# Patient Record
Sex: FEMALE | Race: WHITE
[De-identification: names, ages, dates, MRNs, and addresses within clinical notes are randomized per-mention and may not be internally consistent; named-entity substitution may affect disease eponyms.]

---

## 2017-01-13 ENCOUNTER — HOSPITAL ENCOUNTER (OUTPATIENT)
Dept: HOSPITAL 53 - M PAIN | Age: 72
End: 2017-01-13
Attending: NURSE PRACTITIONER
Payer: MEDICARE

## 2017-01-13 DIAGNOSIS — I63.9: ICD-10-CM

## 2017-01-13 DIAGNOSIS — Z79.891: ICD-10-CM

## 2017-01-13 DIAGNOSIS — Z85.51: ICD-10-CM

## 2017-01-13 DIAGNOSIS — Z88.5: ICD-10-CM

## 2017-01-13 DIAGNOSIS — G89.29: Primary | ICD-10-CM

## 2017-01-13 DIAGNOSIS — G47.30: ICD-10-CM

## 2017-01-13 DIAGNOSIS — F41.9: ICD-10-CM

## 2017-01-13 DIAGNOSIS — Z88.1: ICD-10-CM

## 2017-01-13 DIAGNOSIS — I73.9: ICD-10-CM

## 2017-01-13 DIAGNOSIS — Z79.82: ICD-10-CM

## 2017-01-13 DIAGNOSIS — G43.909: ICD-10-CM

## 2017-01-13 DIAGNOSIS — M19.90: ICD-10-CM

## 2017-01-13 DIAGNOSIS — F32.9: ICD-10-CM

## 2017-01-13 DIAGNOSIS — I25.10: ICD-10-CM

## 2017-01-13 DIAGNOSIS — I10: ICD-10-CM

## 2017-01-13 DIAGNOSIS — Z88.8: ICD-10-CM

## 2017-01-13 DIAGNOSIS — E78.00: ICD-10-CM

## 2017-01-13 DIAGNOSIS — M43.07: ICD-10-CM

## 2017-01-13 DIAGNOSIS — L23.1: ICD-10-CM

## 2017-02-01 NOTE — ECWPNPC
PATIENT NAME: LORI TOWNSEND

: 1945

GENDER: FEMALE

MRN: [id number]

VISIT DATE: 2017

DISCHARGE DATE: 17 1429

VISIT LOCKED DATE TIME: 

PHYSICIAN: THOMAS VILLARREAL  

RESOURCE: THOMAS VILLARREAL  

 

           

           

REASON FOR APPOINTMENT

           

          1. LBP

           

HISTORY OF PRESENT ILLNESS

           

      NEW PATIENT CONSULT: 72 Y/O FEMALE WITH LONG HX OF

          CHRONIC LBP AND BILAT.LEG PAIN.SHE GENERALLY HURTS ALL OVER.CHIEF

          COMPLAINT IS WHAT SOUNDS LIKE NEUROPATHY IN HER LOWER EXTREMITIES

          AGGREVATED WITH WALKING.HX OF CARDIAC ISSUES AND MAY HAVE SOME

          NEUROPATHY RELATED TO THIS.RATING PAIN VAS 3/10.CURRENTLY USING

          TRAMADOL 50MG PRN FOR SEVERE PAIN APROXIMATLEY ONE PER DAY AND IS

          HAPPY WITH THE RELIEF THIS GIVES HER.AT ONE POINT SHE WAS USING

          HYDROCODONE BUT HAS NOT USED THAT IN A LONG TIME BUT FINDS IT

          HELPFUL FOR INFREQUENT SEVERE PAIN EPISODES.DISCUSSED AT LENGTH

          DIFFERENT MEDICATION OPTIONS TO INCLUDE CYMBALTA.PATIENTS 

          VOICED HIS SERIOUS CONCERNS WITH USE OF CYMBALTA.AFTER MUCH

          DISCUSSION PATIENT INFORMED ME THAT SHE IS NOT INTERESTED IN

          TRIALING DIFFERENT MEDICATIONS OR ANY INTERVENTIONAL TRIALS FOR

          BETTER PAIN CONTROL.DENIES BOWEL AND BLADDER INCONTINENECE.NO

          RECENT FEVER OR SUDDEN WEIGHT LOSS.I TOLD PATIENT THAT WE WOULD

          NOT RECOMMEND USE OF HYDROCODONE WITH XANAX DUE TO POTENTIAL

          SERIOUS LIFE THREATENING OUTCOMES.

      WHEN DID YOUR PAIN FIRST START?

          _____.

           

      BRIEFLY DESCRIBE HOW YOUR PAIN STARTED?

          ______.

           

      HOW DOES YOUR PAIN CHANGE WITH TIME?

          _______.

           

      DOES YOUR PAIN AWAKEN YOU FROM SLEEP?

          _____.

           

      HOW MANY HOURS OF SLEEP DO YOU NORMALLY GET?

          ______.

           

      ANY DIAGNOSTIC TESTING?

          _____.

           

      FACILITY WHERE TESTS WERE DONE?

          ____.

           

      PAIN TREATMENT

           

           

          TREATMENT YES

           

      CANCER

           

           

          HAVE YOU EVER HAD ANY TYPE OF CANCER?NO

           

           

          NO.

           

      PAIN SCREENING:

      PATIENT HAS A COMPLAINT OF ACUTE OR CHRONIC PAIN

           

           

          YES

           

      FALL RISK SCREENING:

      SCREENING

           

           

          :NO FALLS IN THE PAST YEAR

           

      BECK INVENTORY:

      QUESTIONNAIRE

           

           

          ASSESSEDTBD

           

      SCORE

           

           

          VALUE CALCULATED TBD

           

CURRENT MEDICATIONS

           

          TAKING PLAVIX 75 MG TABLET 1 TABLET ORALLY ONCE A DAY

          TAKING ASPIRIN 325 MG TABLET 1 TABLET ORALLY ONCE A DAY

          TAKING RANEXA 1000 MG TABLET EXTENDED RELEASE 12 HOUR 1 TABLET

          ORALLY TWICE A DAY

          TAKING XANAX 0.25 MG TABLET 1 TABLET ORALLY TWO TIMES A DAY OR

          PRN

          TAKING COLACE 100 MG CAPSULE 2 CAPSULE AS NEEDED ORALLY ONCE A

          DAY

          TAKING PROTONIX 40 MG TABLET DELAYED RELEASE 1 TABLET ORALLY ONCE

          A DAY

          TAKING MECLIZINE HCL 25 MG TABLET CHEWABLE 1 TABLET AS NEEDED

          ORALLY ONCE A DAY

          TAKING NITROGLYCERINE 0.4MG PATCH

          TAKING POTASSIUM 10MEG 1 TAB ORALLY TWO TIMES A WEEK

          TAKING TRAMADOL HCL 50 MG TABLET 1 TAB ORALLY EVERY 8 HRS AS

          NEEDED

          TAKING METOPROLOL TARTRATE 50 MG TABLET 1/2 TABLET WITH FOOD

          ORALLY TWICE A DAY

          TAKING LOSARTAN POTASSIUM 50 MG TABLET 1 TABLET ORALLY ONCE A DAY

          TAKING PAXIL 10 MG TABLET 1 TABLET IN THE MORNING ORALLY ONCE A

          DAY TAKING PRN

          NOT-TAKING GABAPENTIN 300 300 MG TABLET ORALLY DAILY AS NEEDED

          NOT-TAKING BUSPIRONE HCL 5 MG TABLET 1 TABLET ORALLY TWICE A DAY

          NOT-TAKING HYDROCHLOROTHIAZIDE 12.5 MG CAPSULE 1 CAPSULE ORALLY

          ONCE A DAY

          NOT-TAKING ZETIA 10 MG TABLET 1 TABLET ORALLY ONCE A DAY

          NOT-TAKING CRESTOR 20 MG TABLET 1 TABLET ORALLY ONCE A DAY

          NOT-TAKING PERCOCET 5-325 MG TABLET 1-2 TABLET(S) ORALLY EVERY 6

          HRS AS NEEDED FOR PAIN (MAX DAILY DOSE 8 TABS)

          NOT-TAKING FLOMAX 0.4 MG CAPSULE 1 CAPSULE 30 MINUTES AFTER THE

          SAME MEAL EACH DAY ORALLY ONCE A DAY

          MEDICATION LIST REVIEWED AND RECONCILED WITH THE PATIENT

           

PAST MEDICAL HISTORY

           

          CVA

          BRADYCARDIA

          HTN

          SYNCOPAL EVENT X1 13

          MIGRAINES

          SLEEP APNEA WITH INFREQUENT USE OF CPAP

          CAD, S/P MI

          ANXIETY/DEPRESSION

          ARTHRITIS

          HYPERCHOLESTEROLEMIA

          PERIPHERAL VASCULAR DISEASE (CAROTIDS, LOWER EXTREMITIES)

          CAROTID ARTERY DISEASE

          PAPILLARY UROTHELIAL CARCINOMA

          COLONOSCOPY 2016 WITH HEMORRHOIDS AND DIVERTICULOSIS

          NONCONCLUSIVE LEXISCAN CARDIOLITE STRESS TEST, NORMAL MYOCARDIAL

          PERFUSION, PRESERVED LV FUNCTION 2016

           

ALLERGIES

           

          ADHESIVE TAPE: HIVES: ALLERGY

          TETRACYCLINE HCL: DIARRHEA: ALLERGY

          MINOCINS: DIARRHEA: ALLERGY

          TRENTAL: SICK

          CODEINE PHOSPHATE (FOR ALLERGIES USE ONLY): HIVES

          BUTABARBITAL SODIUM: HIVES

          NITROSTAT: ANAPHYLAXIS

          BUSPAR: ALLERGY

           

SURGICAL HISTORY

           

          TRIPLE BYPASS 

          MULTIPLE PROCEDURES WITH CARDIAC STENTS 2754-3494

          BACK SURGERY 

          STENT RIGHT LEG 2015

          RIGHT NEPHRECTOMY 2016

          LUMBAR 4-5M 1974

           

FAMILY HISTORY

           

          FATHER: 

          SIBLINGS: ALIVE

           

SOCIAL HISTORY

           

          GENERAL:

           

          PAIN CLINIC PFS, CLERGY, PUBLIC HEALTH REFERRALS

          CLERGY REFERRAL NEEDED?NO

          WAS THE PROVIDER NOTIFIED OF ANY PERTINENT INFO?NO

          PFS REFERRAL NEEDED?NO

          PUBLIC HEALTH REFERRAL NEEDED?NO

           

           

          PSYCHOLOGICAL HX

          TREATMENTNO

           

           

          ALCOHOL OR DRUG

          TREATMENTNO

           

           

          PATIENT: ____.

           

           

          ADVANCED DIRECTIVES

          HEALTH CARE PROXY?NO

          POWER OF ?NO

           

           

          SCREENING/ASSESSMENT TOOL

          NUTRITION ASSESSEDYES

          ARE YOU ON ANY SPECIAL DIET?NO

          ANY SIGNIFICANT CHANGES RELATED TO EATING, WEIGHT GAIN/LOSS, OR

          BOWEL HABITS?NO

          IF YES, IS YOUR PRIMARY CARE PROVIDER AWARE OF THIS?NO

           

           

          SPECIAL NEEDS LEVEL OF CARE? SELF, GLASSES: NO, CONTACTS: NO,

          HEARING AIDS: NO, DENTURES: NO, WALKER: NO, CANE: NO, WHEELCHAIR:

          NO, REFERRALS NEEDED: NO.

           

           

          TOBACCO USE

          ARE YOU A:NONSMOKER

           

           

          CAFFEINE

          CAFFEINE USE?NO

           

           

          RECREATIONAL DRUG USE

          DRUG USE?NO

           

REVIEW OF SYSTEMS

           

      CONSTITUTIONAL:

           

          RECENT ILLNESS DENIES . ANY CHANGE IN YOUR MEDICAL CONDITION? NO

          . CHILLS NO . FEVER NO, DENIES . WEIGHT LOSS DENIES .

           

      INFECTION:

           

          DO YOU HAVE NEW INFECTIONS? NO . DO YOU HAVE HISTORY OF MRSA? NO

          .

           

      MUSCULOSKELETAL:

           

          ANY NEW PATTERNS OF PAIN OR NUMBNESS? NO . SYTEMIC LUPUS NO .

          JOINT PAIN DENIES . JOINT STIFFNESS DENIES .

           

      GASTROENTEROLOGY:

           

          BOWEL INCONTINENCE DENIES . ANY NEW CHANGE IN BOWEL CONTROL? NO .

          BARRETTS ESOPHAGUS NO . CIRRHOSIS NO . HEPATITIS NO . LIVER

          FAILURE NO . ACID REFLUX NO . BLOOD IN STOOL DENIES . UNEXPLAINED

          WEIGHT LOSS NO .

           

      GENITOURINARY:

           

          ANY NEW CHANGE IN BLADDER CONTROL? NO . IS THERE A CHANCE YOU

          COULD BE PREGNANT? NO .

           

      HEMATOLOGY/LYMPH:

           

          DENIES . BLEEDING DISORDER DENIES . DO YOU TAKE ANY BLOOD

          THINNERS? (FOR EXAMPLE- COUMADIN, PLAVIX, AGGRENOX, PLATEL,

          PRADAXA, OR XARELTO) NO . WHEN WAS YOUR LAST DOSE? DATE: TIME: .

          LOW PLATELET COUNT NO . SICKLE CELL DISEASE NO . VON WILLIEBRANDS

          NO . FACTOR V LEIDEN NO . THALLASEMIA NO . ANEMIA NO . EASY

          BRUISING NO .

           

      NEUROLOGY:

           

          HAVE YOU FALLEN IN THE PAST 6 MONTHS? NO . ANY NEW EXTREMITY

          NUMBNESS OR WEAKNESS? NO . HEAD INJURY NO . DEMENTIA NO .

          CEREBRAL PALSY NO . MULTIPLE SCLEROSIS NO . DIZZINESS NO .

          HEADACHE NO, DENIES . SEIZURES DENIES . STROKES NO . VERTIGO NO .

           

      CARDIOLOGY:

           

          DO YOU HAVE A PACEMAKER OR DEFIBRILLATOR? NO . ANGINA NO . HEART

          ATTACK NO . HEART SURGERY NO . CONGESTIVE HEART FAILURE/FLUID

          OVERLOAD NO . CHEST PAIN NO, DENIES . HIGH BLOOD PRESSURE NO .

          IRREGULAR HEART BEAT NO . SHORTNESS OF BREATH DENIES .

           

      RESPIRATORY:

           

          HAVE YOU BEEN SICK IN THE PAST WEEK? NO . FEVER NO . FLU LIKE

          SYMPTOMS? NO . CPAP NO . BYPAP NO . ASTHMA NO . EMPHYSEMA NO .

          CHRONIC LUNG DISEASES NO . SHORTNESS OF BREATH ON EXERTION NO .

          DO YOU USE ANY TYPE OF TOBACCO (SMOKE, SMOKELESS, CHEW)? NO .

          COUGH NO, DENIES . SHORTNESS OF BREATH DENIES . SNORING NO .

           

      INTEGUMENTARY:

           

          DO YOU HAVE ANY RASHES OR OPEN SORES? NO .

           

      ALLERGIC/IMMUNO:

           

          ARE YOU ALLERGIC TO SHELLFISH OR IV DYE? NO . ANY NEW ALLERGIES?

          NO .

           

      PSYCHIATRIC:

           

          DO YOU HAVE THOUGHTS OF HURTING YOURSELF OR SOMEONE ELSE? NO .

          ARE YOU ABUSED, NEGLECTED, OR IN AN UNSAFE ENVIRONMENT? NO .

           

      ENDOCRINOLOGY:

           

          THYROID DISEASE DENIES . ARE YOU DIABETIC? NO . DIABETES DENIES .

          THYROID DISORDER NO .

           

      OTHER:

           

          DO YOU NEED ANY PRESCRIPTIONS? YES TRAMODOL AND XANAX . IF YES,

          PLEASE LIST: ____ . ANY NEW PROBLEMS WITH YOUR MEDICATIONS? NO .

          WHEN DID YOU LAST EAT? ____ . WHEN DID YOU LAST DRINK? ____ .

          WHAT DID YOU LAST DRINK? ____ . NAME OF PERSON DRIVING YOU HOME?

          ____ . DO YOU HAVE ANY OTHER QUESTIONS OR CONCERNS NO .

           

      HEENT:

           

          CHANGE IN VISION DENIES . LOSS OF HEARING DENIES . TROUBLE

          SWALLOWING DENIES .

           

      PSYCHOLOGY:

           

          ANXIETY DENIES . DEPRESSION DENIES .

           

      UROLOGY:

           

          URINARY INCONTINENCE DENIES . BLOOD IN URINE DENIES .

           

      REVIEWED BY:

           

          PROVIDER: THOMAS ROSAS .

           

VITAL SIGNS

           

           LBS, HT 61 IN, BMI 26.07 INDEX, /76 MM HG, HR 84

          /MIN, RR 16 /MIN, TEMP 97.9 F, OXYGEN SAT % 98, NA INITIALS TL

          1318, REVIEWED BY: KG.

           

EXAMINATION

           

      GENERAL EXAMINATION:

          HEENT:HEAD:, NORMOCEPHALIC, EYES:, EYES NORMAL, NOSE:, NOSE

          CLEAR, THROAT: NORMAL.

           

          LUNGS:LUNG SOUNDS ARE CLEAR.

           

          HEART:HEART RATE REGULAR.

           

          ABDOMEN:SOFT AND NOT TENDER, NON-DISTENDED.

           

          MUSCULOSKELETAL:*.

           

          LUMBAR SACRAL SPINEMUSCLE STRENGTH TESTING 5/5 BILATERAL,

          PALPATION: NEGATIVE FOR PAIN OVER L/S SPINE. NEGATIVE FOR PAIN

          OVER L/S PARSPINALS.

           

          THORACIC SPINENEGATIVE FOR PAIN WITH PALPATION OF THORACIC SPINE.

          NEGATIVE FOR PAIN WITH PALPATION OF THORACIC PARASPINAL.

           

          CERVICALNEGATIVE FOR PAIN WITH PALPATION OF CERVICAL SPINE.

          NEGATIVE FOR PAIN WITH PALPATION OF CERVICAL PARASPINALS.

          NEGATIVE FOR PAIN WITH PALPATION OF TRAPEZIUS BILAT.

           

          SKIN:NORMAL, NO RASH.

           

          NEUROLOGIC EXAM:ALERT AND ORIENTED X 3, DTRS 1-2+ IN ALL 4

          EXTREMITIES, DENIES UPPER EXTREMETIES SENSORY LOSS, DENIES LOWER

          EXTREMETIES SENSORY LOSS.

           

          DIAGNOSTIC:NO SPINAL IMAGING AVAIALABLE AT THIS VISIT..

           

ASSESSMENTS

           

          LUMBOSACRAL SPONDYLOLYSIS - M43.07 (PRIMARY)

           

          CHRONIC PRESCRIPTION OPIATE USE - Z79.891

           

TREATMENT

           

      LUMBOSACRAL SPONDYLOLYSIS

          CLINICAL NOTES: PATIENT IS NOT A CANDIDATE FOR INTERVENTIONAL

          THERAPY.CURRENTLY USING TRAMADOL 50MG PERIODICALLY FOR SEVERE

          PAIN EPISODES WITH GOOD EFFECT.DENIES SIDE EFFECTS.SHE IS NOT

          INTERESTED IN TRIAL OF NEW MEDICATIONS.MY RECOMMENDATION WOULD BE

          TO CONTINUE PRN TRAMADOL FOR SEVERE PAIN EPISODES PER PRIMARY

          CARE DISCRETION.

           

           

      OTHERS

          CLINICAL NOTES: ISTOP REGISTRY REVIEWED AND DEMONSTRATES

          COMPLIANCE.

           

PROCEDURE CODES

           

           ESTABILISHED PATIENT [facility] CHARGE

           

           PAIN ASSESS POS TOOL F/U PLAN DOC

           

           DOC MEDS VERIFIED W/PT OR RE

           

FOLLOW UP

           

          NO F/U NECESSARY

           

 

ELECTRONICALLY SIGNED BY RALPH GOMEZ ON

          2017 AT 04:37 PM EST

           

           

           

 

DISCLAIMER :

THIS IS A VISIT SUMMARY EXTRACTED FROM THE [employer] CHART.

IT IS NOT A COPY OF THE [employer] PROGRESS NOTE.

MTDD

## 2017-02-16 ENCOUNTER — HOSPITAL ENCOUNTER (OUTPATIENT)
Dept: HOSPITAL 53 - M LAB | Age: 72
End: 2017-02-16
Attending: FAMILY MEDICINE
Payer: MEDICARE

## 2017-02-16 ENCOUNTER — HOSPITAL ENCOUNTER (OUTPATIENT)
Dept: HOSPITAL 53 - M LAB | Age: 72
End: 2017-02-16
Attending: INTERNAL MEDICINE
Payer: MEDICARE

## 2017-02-16 DIAGNOSIS — R07.9: ICD-10-CM

## 2017-02-16 DIAGNOSIS — R07.9: Primary | ICD-10-CM

## 2017-02-16 DIAGNOSIS — E78.5: Primary | ICD-10-CM

## 2017-02-16 LAB
CHOLEST SERPL-MCNC: 256 MG/DL (ref ?–200)
CREAT SERPL-MCNC: 1.4 MG/DL (ref 0.55–1.02)
GFR SERPL CREATININE-BSD FRML MDRD: 39.2 ML/MIN/{1.73_M2} (ref 39–?)
TRIGL SERPL-MCNC: 366 MG/DL (ref ?–150)

## 2017-04-06 ENCOUNTER — HOSPITAL ENCOUNTER (OUTPATIENT)
Dept: HOSPITAL 53 - M LABNEURO | Age: 72
End: 2017-04-06
Attending: PSYCHIATRY & NEUROLOGY
Payer: MEDICARE

## 2017-04-06 DIAGNOSIS — G62.9: Primary | ICD-10-CM

## 2017-04-06 DIAGNOSIS — Z79.899: ICD-10-CM

## 2017-04-06 LAB
ALBUMIN SERPL BCG-MCNC: 4.1 GM/DL (ref 3.2–5.2)
ALBUMIN/GLOB SERPL: 1.14 {RATIO} (ref 1–1.93)
ALP SERPL-CCNC: 69 U/L (ref 45–117)
ALT SERPL W P-5'-P-CCNC: 27 U/L (ref 12–78)
ANION GAP SERPL CALC-SCNC: 5 MEQ/L (ref 8–16)
AST SERPL-CCNC: 18 U/L (ref 15–37)
BASOPHILS # BLD AUTO: 0 K/MM3 (ref 0–0.2)
BASOPHILS NFR BLD AUTO: 0.3 % (ref 0–1)
BILIRUB SERPL-MCNC: 0.4 MG/DL (ref 0.2–1)
BUN SERPL-MCNC: 26 MG/DL (ref 7–18)
CALCIUM SERPL-MCNC: 9.5 MG/DL (ref 8.8–10.2)
CHLORIDE SERPL-SCNC: 105 MEQ/L (ref 98–107)
CO2 SERPL-SCNC: 28 MEQ/L (ref 21–32)
CREAT SERPL-MCNC: 1.48 MG/DL (ref 0.55–1.02)
EOSINOPHIL # BLD AUTO: 0 K/MM3 (ref 0–0.5)
EOSINOPHIL NFR BLD AUTO: 0.2 % (ref 0–3)
ERYTHROCYTE [DISTWIDTH] IN BLOOD BY AUTOMATED COUNT: 13.1 % (ref 11.5–14.5)
ERYTHROCYTE [SEDIMENTATION RATE] IN BLOOD BY WESTERGREN METHOD: 66 MM/HR (ref 0–30)
EST. AVERAGE GLUCOSE BLD GHB EST-MCNC: 114 MG/DL (ref 60–110)
FOLATE SERPL-MCNC: > 24 NG/ML (ref 5.4–?)
GFR SERPL CREATININE-BSD FRML MDRD: 36.8 ML/MIN/{1.73_M2} (ref 39–?)
GLUCOSE SERPL-MCNC: 85 MG/DL (ref 83–110)
LARGE UNSTAINED CELL #: 0.1 K/MM3 (ref 0–0.4)
LARGE UNSTAINED CELL %: 2 % (ref 0–4)
LYMPHOCYTES # BLD AUTO: 1.9 K/MM3 (ref 1.5–4.5)
LYMPHOCYTES NFR BLD AUTO: 32.7 % (ref 24–44)
MCH RBC QN AUTO: 35.7 PG (ref 27–33)
MCHC RBC AUTO-ENTMCNC: 33.9 G/DL (ref 32–36.5)
MCV RBC AUTO: 105.3 FL (ref 80–96)
MONOCYTES # BLD AUTO: 0.7 K/MM3 (ref 0–0.8)
MONOCYTES NFR BLD AUTO: 13.1 % (ref 0–5)
NEUTROPHILS # BLD AUTO: 2.8 K/MM3 (ref 1.8–7.7)
NEUTROPHILS NFR BLD AUTO: 51.6 % (ref 36–66)
PLATELET # BLD AUTO: 235 K/MM3 (ref 150–450)
POTASSIUM SERPL-SCNC: 4.3 MEQ/L (ref 3.5–5.1)
PROT SERPL-MCNC: 7.7 GM/DL (ref 6.4–8.2)
SODIUM SERPL-SCNC: 138 MEQ/L (ref 136–145)
VIT B12 SERPL-MCNC: 340 PG/ML (ref 247–911)
WBC # BLD AUTO: 5.5 K/MM3 (ref 4–10)

## 2017-04-09 LAB — A-TOCOPHEROL VIT E SERPL-MCNC: 17.2 MG/L (ref 6.5–21.5)

## 2017-04-10 LAB
ALBUMIN MFR UR ELPH: 58.1 % (ref 55.8–66.1)
ALBUMIN SERPL-MCNC: 4.47 GM/DL (ref 3.29–5.55)
GAMMA GLOB 24H MFR UR ELPH: 16.8 % (ref 11.1–18.8)

## 2017-08-24 ENCOUNTER — HOSPITAL ENCOUNTER (OUTPATIENT)
Dept: HOSPITAL 53 - M ADAMS | Age: 72
End: 2017-08-24
Attending: PHYSICIAN ASSISTANT
Payer: MEDICARE

## 2017-08-24 DIAGNOSIS — K57.32: Primary | ICD-10-CM

## 2017-08-24 LAB
ANION GAP SERPL CALC-SCNC: 6 MEQ/L (ref 8–16)
BASOPHILS # BLD AUTO: 0 K/MM3 (ref 0–0.2)
BASOPHILS NFR BLD AUTO: 0.3 % (ref 0–1)
BUN SERPL-MCNC: 20 MG/DL (ref 7–18)
CALCIUM SERPL-MCNC: 9.1 MG/DL (ref 8.8–10.2)
CHLORIDE SERPL-SCNC: 102 MEQ/L (ref 98–107)
CO2 SERPL-SCNC: 29 MEQ/L (ref 21–32)
CREAT SERPL-MCNC: 1.31 MG/DL (ref 0.55–1.02)
EOSINOPHIL # BLD AUTO: 0 K/MM3 (ref 0–0.5)
EOSINOPHIL NFR BLD AUTO: 0 % (ref 0–3)
ERYTHROCYTE [DISTWIDTH] IN BLOOD BY AUTOMATED COUNT: 12.9 % (ref 11.5–14.5)
GFR SERPL CREATININE-BSD FRML MDRD: 42.4 ML/MIN/{1.73_M2} (ref 39–?)
GLUCOSE SERPL-MCNC: 92 MG/DL (ref 83–110)
LARGE UNSTAINED CELL #: 0.2 K/MM3 (ref 0–0.4)
LARGE UNSTAINED CELL %: 2 % (ref 0–4)
LYMPHOCYTES # BLD AUTO: 2.4 K/MM3 (ref 1.5–4.5)
LYMPHOCYTES NFR BLD AUTO: 22.6 % (ref 24–44)
MCH RBC QN AUTO: 34.9 PG (ref 27–33)
MCHC RBC AUTO-ENTMCNC: 33.8 G/DL (ref 32–36.5)
MCV RBC AUTO: 103.2 FL (ref 80–96)
MONOCYTES # BLD AUTO: 1.4 K/MM3 (ref 0–0.8)
MONOCYTES NFR BLD AUTO: 13.5 % (ref 0–5)
NEUTROPHILS # BLD AUTO: 6.5 K/MM3 (ref 1.8–7.7)
NEUTROPHILS NFR BLD AUTO: 61.7 % (ref 36–66)
PLATELET # BLD AUTO: 252 K/MM3 (ref 150–450)
POTASSIUM SERPL-SCNC: 4.4 MEQ/L (ref 3.5–5.1)
SODIUM SERPL-SCNC: 137 MEQ/L (ref 136–145)
WBC # BLD AUTO: 10.5 K/MM3 (ref 4–10)

## 2017-09-16 ENCOUNTER — HOSPITAL ENCOUNTER (OUTPATIENT)
Dept: HOSPITAL 53 - M LAB REF | Age: 72
End: 2017-09-16
Attending: NURSE PRACTITIONER
Payer: MEDICARE

## 2017-09-16 DIAGNOSIS — K57.32: Primary | ICD-10-CM

## 2017-09-16 DIAGNOSIS — R10.9: ICD-10-CM

## 2017-09-16 DIAGNOSIS — R50.9: ICD-10-CM

## 2017-12-05 ENCOUNTER — HOSPITAL ENCOUNTER (OUTPATIENT)
Dept: HOSPITAL 53 - M LABDRWAD | Age: 72
End: 2017-12-05
Attending: PHYSICIAN ASSISTANT
Payer: MEDICARE

## 2017-12-05 DIAGNOSIS — R94.39: Primary | ICD-10-CM

## 2017-12-05 LAB
ANION GAP SERPL CALC-SCNC: 7 MEQ/L (ref 8–16)
BASOPHILS # BLD AUTO: 0 10^3/UL (ref 0–0.2)
BASOPHILS NFR BLD AUTO: 0.1 % (ref 0–1)
BUN SERPL-MCNC: 32 MG/DL (ref 7–18)
CALCIUM SERPL-MCNC: 9.5 MG/DL (ref 8.8–10.2)
CHLORIDE SERPL-SCNC: 104 MEQ/L (ref 98–107)
CO2 SERPL-SCNC: 30 MEQ/L (ref 21–32)
CREAT SERPL-MCNC: 1.55 MG/DL (ref 0.55–1.02)
EOSINOPHIL # BLD AUTO: 0 10^3/UL (ref 0–0.5)
EOSINOPHIL NFR BLD AUTO: 0 % (ref 0–3)
ERYTHROCYTE [DISTWIDTH] IN BLOOD BY AUTOMATED COUNT: 12.5 % (ref 11.5–14.5)
GFR SERPL CREATININE-BSD FRML MDRD: 34.8 ML/MIN/{1.73_M2} (ref 39–?)
GLUCOSE SERPL-MCNC: 67 MG/DL (ref 83–110)
IMM GRANULOCYTES NFR BLD: 0.6 % (ref 0–0)
LYMPHOCYTES # BLD AUTO: 4.2 10^3/UL (ref 1.5–4.5)
LYMPHOCYTES NFR BLD AUTO: 47.8 % (ref 24–44)
MCH RBC QN AUTO: 34.5 PG (ref 27–33)
MCHC RBC AUTO-ENTMCNC: 33 G/DL (ref 32–36.5)
MCV RBC AUTO: 104.6 FL (ref 80–96)
MONOCYTES # BLD AUTO: 1 10^3/UL (ref 0–0.8)
MONOCYTES NFR BLD AUTO: 11.8 % (ref 0–5)
NEUTROPHILS # BLD AUTO: 3.5 10^3/UL (ref 1.8–7.7)
NEUTROPHILS NFR BLD AUTO: 39.7 % (ref 36–66)
NRBC BLD AUTO-RTO: 0 % (ref 0–0)
PLATELET # BLD AUTO: 272 10^3/UL (ref 150–450)
POTASSIUM SERPL-SCNC: 5.1 MEQ/L (ref 3.5–5.1)
SODIUM SERPL-SCNC: 141 MEQ/L (ref 136–145)
WBC # BLD AUTO: 8.8 10^3/UL (ref 4–10)

## 2017-12-12 ENCOUNTER — HOSPITAL ENCOUNTER (OUTPATIENT)
Dept: HOSPITAL 53 - M LABDRWAD | Age: 72
End: 2017-12-12
Attending: INTERNAL MEDICINE
Payer: MEDICARE

## 2017-12-12 DIAGNOSIS — R07.9: Primary | ICD-10-CM

## 2017-12-12 LAB
CREAT SERPL-MCNC: 1.7 MG/DL (ref 0.55–1.02)
GFR SERPL CREATININE-BSD FRML MDRD: 31.3 ML/MIN/{1.73_M2} (ref 39–?)

## 2018-03-08 ENCOUNTER — HOSPITAL ENCOUNTER (OUTPATIENT)
Dept: HOSPITAL 53 - M LAB | Age: 73
End: 2018-03-08
Attending: SURGERY
Payer: MEDICARE

## 2018-03-08 DIAGNOSIS — R10.33: ICD-10-CM

## 2018-03-08 DIAGNOSIS — R19.7: Primary | ICD-10-CM

## 2018-03-08 LAB
ALBUMIN/GLOBULIN RATIO: 1.2 (ref 1–1.93)
ALBUMIN: 4.2 GM/DL (ref 3.2–5.2)
ALKALINE PHOSPHATASE: 64 U/L (ref 45–117)
ALT SERPL W P-5'-P-CCNC: 21 U/L (ref 12–78)
ANION GAP: 10 MEQ/L (ref 8–16)
AST SERPL-CCNC: 17 U/L (ref 7–37)
BASO #: 0 10^3/UL (ref 0–0.2)
BASO %: 0.1 % (ref 0–1)
BILIRUBIN,TOTAL: 0.4 MG/DL (ref 0.2–1)
BLOOD UREA NITROGEN: 30 MG/DL (ref 7–18)
CALCIUM LEVEL: 9.5 MG/DL (ref 8.8–10.2)
CARBON DIOXIDE LEVEL: 25 MEQ/L (ref 21–32)
CHLORIDE LEVEL: 106 MEQ/L (ref 98–107)
CREATININE FOR GFR: 1.61 MG/DL (ref 0.55–1.3)
EOS #: 0 10^3/UL (ref 0–0.5)
EOSINOPHIL NFR BLD AUTO: 0.1 % (ref 0–3)
GFR SERPL CREATININE-BSD FRML MDRD: 33.3 ML/MIN/{1.73_M2} (ref 39–?)
GLUCOSE, FASTING: 90 MG/DL (ref 70–100)
HEMATOCRIT: 34.3 % (ref 36–47)
HEMOGLOBIN: 11.4 G/DL (ref 12–16)
IMMATURE GRANULOCYTE %: 1 % (ref 0–3)
LYMPH #: 2.9 10^3/UL (ref 1.5–4.5)
LYMPH %: 35.6 % (ref 24–44)
MEAN CORPUSCULAR HEMOGLOBIN: 34.1 PG (ref 27–33)
MEAN CORPUSCULAR HGB CONC: 33.2 G/DL (ref 32–36.5)
MEAN CORPUSCULAR VOLUME: 102.7 FL (ref 80–96)
MONO #: 1.3 10^3/UL (ref 0–0.8)
MONO %: 15.8 % (ref 0–5)
NEUTROPHILS #: 3.8 10^3/UL (ref 1.8–7.7)
NEUTROPHILS %: 47.4 % (ref 36–66)
NRBC BLD AUTO-RTO: 0 % (ref 0–0)
PLATELET COUNT, AUTOMATED: 250 10^3/UL (ref 150–450)
POTASSIUM SERUM: 4.3 MEQ/L (ref 3.5–5.1)
RED BLOOD COUNT: 3.34 10^6/UL (ref 4–5.4)
RED CELL DISTRIBUTION WIDTH: 13.2 % (ref 11.5–14.5)
SODIUM LEVEL: 141 MEQ/L (ref 136–145)
TOTAL PROTEIN: 7.7 GM/DL (ref 6.4–8.2)
WHITE BLOOD COUNT: 8.1 10^3/UL (ref 4–10)

## 2018-05-17 ENCOUNTER — HOSPITAL ENCOUNTER (OUTPATIENT)
Dept: HOSPITAL 53 - M RAD | Age: 73
End: 2018-05-17
Attending: PHYSICIAN ASSISTANT
Payer: MEDICARE

## 2018-05-17 DIAGNOSIS — Z98.890: ICD-10-CM

## 2018-05-17 DIAGNOSIS — R10.9: ICD-10-CM

## 2018-05-17 DIAGNOSIS — R93.5: Primary | ICD-10-CM

## 2018-05-17 DIAGNOSIS — R19.7: ICD-10-CM

## 2018-05-19 ENCOUNTER — HOSPITAL ENCOUNTER (OUTPATIENT)
Dept: HOSPITAL 53 - M LAB REF | Age: 73
End: 2018-05-19
Attending: PHYSICIAN ASSISTANT
Payer: MEDICARE

## 2018-05-19 DIAGNOSIS — R10.9: Primary | ICD-10-CM

## 2018-05-19 PROCEDURE — 87507 IADNA-DNA/RNA PROBE TQ 12-25: CPT

## 2018-06-12 ENCOUNTER — HOSPITAL ENCOUNTER (OUTPATIENT)
Dept: HOSPITAL 53 - M SFHCPLAZ | Age: 73
End: 2018-06-12
Attending: INTERNAL MEDICINE
Payer: MEDICARE

## 2018-06-12 DIAGNOSIS — C67.9: ICD-10-CM

## 2018-06-12 DIAGNOSIS — A04.71: Primary | ICD-10-CM

## 2018-06-12 LAB
ALBUMIN/GLOBULIN RATIO: 0.97 (ref 1–1.93)
ALBUMIN: 3.5 GM/DL (ref 3.2–5.2)
ALKALINE PHOSPHATASE: 68 U/L (ref 45–117)
ALT SERPL W P-5'-P-CCNC: 13 U/L (ref 12–78)
ANION GAP: 7 MEQ/L (ref 8–16)
AST SERPL-CCNC: 13 U/L (ref 7–37)
BASO #: 0 10^3/UL (ref 0–0.2)
BASO %: 0.1 % (ref 0–1)
BILIRUBIN,TOTAL: 0.4 MG/DL (ref 0.2–1)
BLOOD UREA NITROGEN: 29 MG/DL (ref 7–18)
CALCIUM LEVEL: 8.6 MG/DL (ref 8.8–10.2)
CARBON DIOXIDE LEVEL: 29 MEQ/L (ref 21–32)
CHLORIDE LEVEL: 104 MEQ/L (ref 98–107)
CREATININE FOR GFR: 1.5 MG/DL (ref 0.55–1.3)
CRP SERPL-MCNC: 0.69 MG/DL (ref 0–0.3)
EOS #: 0 10^3/UL (ref 0–0.5)
EOSINOPHIL NFR BLD AUTO: 0 % (ref 0–3)
GFR SERPL CREATININE-BSD FRML MDRD: 36.1 ML/MIN/{1.73_M2} (ref 39–?)
GLUCOSE, FASTING: 70 MG/DL (ref 70–100)
HEMATOCRIT: 32.6 % (ref 36–47)
HEMOGLOBIN: 10.5 G/DL (ref 12–15.5)
IMMATURE GRANULOCYTE %: 0.6 % (ref 0–3)
LYMPH #: 2.9 10^3/UL (ref 1.5–4.5)
LYMPH %: 32.2 % (ref 24–44)
MEAN CORPUSCULAR HEMOGLOBIN: 32.9 PG (ref 27–33)
MEAN CORPUSCULAR HGB CONC: 32.2 G/DL (ref 32–36.5)
MEAN CORPUSCULAR VOLUME: 102.2 FL (ref 80–96)
MONO #: 1.2 10^3/UL (ref 0–0.8)
MONO %: 13.6 % (ref 0–5)
NEUTROPHILS #: 4.7 10^3/UL (ref 1.8–7.7)
NEUTROPHILS %: 53.5 % (ref 36–66)
NRBC BLD AUTO-RTO: 0 % (ref 0–0)
PLATELET COUNT, AUTOMATED: 225 10^3/UL (ref 150–450)
POTASSIUM SERUM: 5 MEQ/L (ref 3.5–5.1)
RED BLOOD COUNT: 3.19 10^6/UL (ref 4–5.4)
RED CELL DISTRIBUTION WIDTH: 13.4 % (ref 11.5–14.5)
SODIUM LEVEL: 140 MEQ/L (ref 136–145)
TOTAL PROTEIN: 7.1 GM/DL (ref 6.4–8.2)
WHITE BLOOD COUNT: 8.9 10^3/UL (ref 4–10)

## 2018-06-12 PROCEDURE — 80053 COMPREHEN METABOLIC PANEL: CPT

## 2018-06-26 ENCOUNTER — HOSPITAL ENCOUNTER (OUTPATIENT)
Dept: HOSPITAL 53 - M LAB REF | Age: 73
End: 2018-06-26
Attending: INTERNAL MEDICINE
Payer: MEDICARE

## 2018-06-26 DIAGNOSIS — D64.9: Primary | ICD-10-CM

## 2018-06-26 LAB
FERRITIN: 82 NG/ML (ref 8–252)
FOLATE SERPL-MCNC: 14.5 NG/ML
IRON (FE): 129 UG/DL (ref 50–170)
IRON SATN MFR SERPL: 41.7 % (ref 13.2–45)
TOTAL IRON BINDING CAPACITY: 309 UG/DL (ref 250–450)
VIT B12 SERPL-MCNC: 322 PG/ML

## 2018-06-26 PROCEDURE — 82746 ASSAY OF FOLIC ACID SERUM: CPT

## 2018-08-21 ENCOUNTER — HOSPITAL ENCOUNTER (OUTPATIENT)
Dept: HOSPITAL 53 - M SFHCPLAZ | Age: 73
End: 2018-08-21
Attending: INTERNAL MEDICINE
Payer: MEDICARE

## 2018-08-21 DIAGNOSIS — A04.71: Primary | ICD-10-CM

## 2018-08-21 PROCEDURE — 87507 IADNA-DNA/RNA PROBE TQ 12-25: CPT

## 2018-08-23 ENCOUNTER — HOSPITAL ENCOUNTER (OUTPATIENT)
Dept: HOSPITAL 53 - M SFHCPLAZ | Age: 73
End: 2018-08-23
Attending: INTERNAL MEDICINE
Payer: MEDICARE

## 2018-08-23 DIAGNOSIS — Z79.899: ICD-10-CM

## 2018-08-23 DIAGNOSIS — E78.5: ICD-10-CM

## 2018-08-23 DIAGNOSIS — R42: Primary | ICD-10-CM

## 2018-08-23 DIAGNOSIS — I10: ICD-10-CM

## 2018-08-23 LAB
ALBUMIN/GLOBULIN RATIO: 1 (ref 1–1.93)
ALBUMIN: 3.7 GM/DL (ref 3.2–5.2)
ALKALINE PHOSPHATASE: 72 U/L (ref 45–117)
ALT SERPL W P-5'-P-CCNC: 19 U/L (ref 12–78)
ANION GAP: 6 MEQ/L (ref 8–16)
AST SERPL-CCNC: 17 U/L (ref 7–37)
BASO #: 0 10^3/UL (ref 0–0.2)
BASO %: 0.1 % (ref 0–1)
BILIRUBIN,TOTAL: 0.3 MG/DL (ref 0.2–1)
BLOOD UREA NITROGEN: 29 MG/DL (ref 7–18)
CALCIUM LEVEL: 8.8 MG/DL (ref 8.8–10.2)
CARBON DIOXIDE LEVEL: 29 MEQ/L (ref 21–32)
CHLORIDE LEVEL: 105 MEQ/L (ref 98–107)
CREATININE FOR GFR: 1.36 MG/DL (ref 0.55–1.3)
EOS #: 0 10^3/UL (ref 0–0.5)
EOSINOPHIL NFR BLD AUTO: 0 % (ref 0–3)
EST. AVERAGE GLUCOSE BLD GHB EST-MCNC: 103 MG/DL (ref 60–110)
FOLATE SERPL-MCNC: 15.6 NG/ML
GFR SERPL CREATININE-BSD FRML MDRD: 40.5 ML/MIN/{1.73_M2} (ref 39–?)
GLUCOSE, FASTING: 67 MG/DL (ref 70–100)
HEMATOCRIT: 34.2 % (ref 36–47)
HEMOGLOBIN: 11.4 G/DL (ref 12–15.5)
IMMATURE GRANULOCYTE %: 0.7 % (ref 0–3)
LYMPH #: 3.6 10^3/UL (ref 1.5–4.5)
LYMPH %: 40.7 % (ref 24–44)
MEAN CORPUSCULAR HEMOGLOBIN: 35.1 PG (ref 27–33)
MEAN CORPUSCULAR HGB CONC: 33.3 G/DL (ref 32–36.5)
MEAN CORPUSCULAR VOLUME: 105.2 FL (ref 80–96)
MONO #: 1.2 10^3/UL (ref 0–0.8)
MONO %: 13.6 % (ref 0–5)
NEUTROPHILS #: 3.9 10^3/UL (ref 1.8–7.7)
NEUTROPHILS %: 44.9 % (ref 36–66)
NRBC BLD AUTO-RTO: 0 % (ref 0–0)
PLATELET COUNT, AUTOMATED: 245 10^3/UL (ref 150–450)
POTASSIUM SERUM: 5.1 MEQ/L (ref 3.5–5.1)
RED BLOOD COUNT: 3.25 10^6/UL (ref 4–5.4)
RED CELL DISTRIBUTION WIDTH: 13.5 % (ref 11.5–14.5)
SODIUM LEVEL: 140 MEQ/L (ref 136–145)
TOTAL PROTEIN: 7.4 GM/DL (ref 6.4–8.2)
VIT B12 SERPL-MCNC: 312 PG/ML
WHITE BLOOD COUNT: 8.7 10^3/UL (ref 4–10)

## 2018-08-23 PROCEDURE — 82746 ASSAY OF FOLIC ACID SERUM: CPT

## 2018-09-04 ENCOUNTER — HOSPITAL ENCOUNTER (OUTPATIENT)
Dept: HOSPITAL 53 - M WHC | Age: 73
End: 2018-09-04
Attending: NURSE PRACTITIONER
Payer: MEDICARE

## 2018-09-04 DIAGNOSIS — Z12.31: Primary | ICD-10-CM

## 2018-09-04 PROCEDURE — 77067 SCR MAMMO BI INCL CAD: CPT

## 2018-09-18 ENCOUNTER — HOSPITAL ENCOUNTER (OUTPATIENT)
Dept: HOSPITAL 53 - M SFHCPLAZ | Age: 73
End: 2018-09-18
Attending: INTERNAL MEDICINE
Payer: MEDICARE

## 2018-09-18 DIAGNOSIS — A04.71: Primary | ICD-10-CM

## 2018-09-18 PROCEDURE — 87507 IADNA-DNA/RNA PROBE TQ 12-25: CPT

## 2018-10-19 ENCOUNTER — HOSPITAL ENCOUNTER (OUTPATIENT)
Dept: HOSPITAL 53 - M RAD | Age: 73
End: 2018-10-19
Attending: SURGERY
Payer: MEDICARE

## 2018-10-19 DIAGNOSIS — I65.23: Primary | ICD-10-CM

## 2018-10-26 ENCOUNTER — HOSPITAL ENCOUNTER (OUTPATIENT)
Dept: HOSPITAL 53 - M OPP | Age: 73
Discharge: HOME | End: 2018-10-26
Attending: INTERNAL MEDICINE
Payer: MEDICARE

## 2018-10-26 DIAGNOSIS — Z88.8: ICD-10-CM

## 2018-10-26 DIAGNOSIS — Z88.5: ICD-10-CM

## 2018-10-26 DIAGNOSIS — Z88.0: ICD-10-CM

## 2018-10-26 DIAGNOSIS — Z79.82: ICD-10-CM

## 2018-10-26 DIAGNOSIS — Z80.0: ICD-10-CM

## 2018-10-26 DIAGNOSIS — Z79.899: ICD-10-CM

## 2018-10-26 DIAGNOSIS — A04.71: Primary | ICD-10-CM

## 2018-10-26 DIAGNOSIS — Z79.01: ICD-10-CM

## 2018-10-26 RX ADMIN — SODIUM CHLORIDE 1 MLS/HR: 9 INJECTION, SOLUTION INTRAVENOUS at 14:00

## 2018-11-30 ENCOUNTER — HOSPITAL ENCOUNTER (OUTPATIENT)
Dept: HOSPITAL 53 - M OPP | Age: 73
Discharge: HOME | End: 2018-11-30
Attending: INTERNAL MEDICINE
Payer: MEDICARE

## 2018-11-30 DIAGNOSIS — Z79.01: ICD-10-CM

## 2018-11-30 DIAGNOSIS — I25.10: ICD-10-CM

## 2018-11-30 DIAGNOSIS — F41.9: ICD-10-CM

## 2018-11-30 DIAGNOSIS — Z79.82: ICD-10-CM

## 2018-11-30 DIAGNOSIS — J44.9: ICD-10-CM

## 2018-11-30 DIAGNOSIS — Z88.8: ICD-10-CM

## 2018-11-30 DIAGNOSIS — Z88.5: ICD-10-CM

## 2018-11-30 DIAGNOSIS — Z86.73: ICD-10-CM

## 2018-11-30 DIAGNOSIS — Z87.891: ICD-10-CM

## 2018-11-30 DIAGNOSIS — I10: ICD-10-CM

## 2018-11-30 DIAGNOSIS — K21.9: ICD-10-CM

## 2018-11-30 DIAGNOSIS — M19.90: ICD-10-CM

## 2018-11-30 DIAGNOSIS — I25.2: ICD-10-CM

## 2018-11-30 DIAGNOSIS — K58.0: ICD-10-CM

## 2018-11-30 DIAGNOSIS — Z98.890: ICD-10-CM

## 2018-11-30 DIAGNOSIS — Z91.048: ICD-10-CM

## 2018-11-30 DIAGNOSIS — K29.70: Primary | ICD-10-CM

## 2018-11-30 DIAGNOSIS — R00.1: ICD-10-CM

## 2018-11-30 DIAGNOSIS — M79.7: ICD-10-CM

## 2018-11-30 DIAGNOSIS — G47.30: ICD-10-CM

## 2018-11-30 DIAGNOSIS — M88.9: ICD-10-CM

## 2018-11-30 DIAGNOSIS — Z98.0: ICD-10-CM

## 2018-11-30 DIAGNOSIS — E78.00: ICD-10-CM

## 2018-11-30 DIAGNOSIS — Z90.49: ICD-10-CM

## 2018-11-30 DIAGNOSIS — Z88.2: ICD-10-CM

## 2018-11-30 DIAGNOSIS — Z79.899: ICD-10-CM

## 2018-11-30 DIAGNOSIS — Z88.1: ICD-10-CM

## 2018-11-30 DIAGNOSIS — Z95.5: ICD-10-CM

## 2018-11-30 PROCEDURE — 43239 EGD BIOPSY SINGLE/MULTIPLE: CPT

## 2018-11-30 RX ADMIN — SODIUM CHLORIDE 1 MLS/HR: 9 INJECTION, SOLUTION INTRAVENOUS at 14:22

## 2019-01-07 ENCOUNTER — HOSPITAL ENCOUNTER (OUTPATIENT)
Dept: HOSPITAL 53 - M LABDRAW1 | Age: 74
End: 2019-01-07
Attending: NURSE PRACTITIONER
Payer: MEDICARE

## 2019-01-07 DIAGNOSIS — E78.2: ICD-10-CM

## 2019-01-07 DIAGNOSIS — E55.9: ICD-10-CM

## 2019-01-07 DIAGNOSIS — E16.2: ICD-10-CM

## 2019-01-07 DIAGNOSIS — I10: Primary | ICD-10-CM

## 2019-01-07 LAB
25(OH)D3 SERPL-MCNC: 25.8 NG/ML (ref 30–100)
ALBUMIN SERPL BCG-MCNC: 3.8 GM/DL (ref 3.2–5.2)
ALT SERPL W P-5'-P-CCNC: 19 U/L (ref 12–78)
BILIRUB SERPL-MCNC: 0.3 MG/DL (ref 0.2–1)
BUN SERPL-MCNC: 30 MG/DL (ref 7–18)
CALCIUM SERPL-MCNC: 9.1 MG/DL (ref 8.8–10.2)
CHLORIDE SERPL-SCNC: 105 MEQ/L (ref 98–107)
CHOLEST SERPL-MCNC: 311 MG/DL (ref ?–200)
CHOLEST/HDLC SERPL: 7.23 {RATIO} (ref ?–5)
CO2 SERPL-SCNC: 30 MEQ/L (ref 21–32)
CREAT SERPL-MCNC: 1.59 MG/DL (ref 0.55–1.3)
EST. AVERAGE GLUCOSE BLD GHB EST-MCNC: 97 MG/DL (ref 60–110)
GFR SERPL CREATININE-BSD FRML MDRD: 33.7 ML/MIN/{1.73_M2} (ref 39–?)
GLUCOSE SERPL-MCNC: 85 MG/DL (ref 70–100)
HDLC SERPL-MCNC: 43 MG/DL (ref 40–?)
LDLC SERPL CALC-MCNC: 198 MG/DL (ref ?–100)
NONHDLC SERPL-MCNC: 268 MG/DL
POTASSIUM SERPL-SCNC: 4.8 MEQ/L (ref 3.5–5.1)
PROT SERPL-MCNC: 7.2 GM/DL (ref 6.4–8.2)
SODIUM SERPL-SCNC: 140 MEQ/L (ref 136–145)
TRIGL SERPL-MCNC: 350 MG/DL (ref ?–150)

## 2019-02-11 ENCOUNTER — HOSPITAL ENCOUNTER (EMERGENCY)
Dept: HOSPITAL 53 - M ED | Age: 74
Discharge: HOME | End: 2019-02-11
Payer: MEDICARE

## 2019-02-11 VITALS — SYSTOLIC BLOOD PRESSURE: 210 MMHG | DIASTOLIC BLOOD PRESSURE: 90 MMHG

## 2019-02-11 VITALS — HEIGHT: 61 IN | BODY MASS INDEX: 26.87 KG/M2 | WEIGHT: 142.31 LBS

## 2019-02-11 DIAGNOSIS — Z90.5: ICD-10-CM

## 2019-02-11 DIAGNOSIS — I11.0: ICD-10-CM

## 2019-02-11 DIAGNOSIS — Z79.82: ICD-10-CM

## 2019-02-11 DIAGNOSIS — Z91.048: ICD-10-CM

## 2019-02-11 DIAGNOSIS — Z88.5: ICD-10-CM

## 2019-02-11 DIAGNOSIS — Z87.440: ICD-10-CM

## 2019-02-11 DIAGNOSIS — Z88.8: ICD-10-CM

## 2019-02-11 DIAGNOSIS — J44.9: ICD-10-CM

## 2019-02-11 DIAGNOSIS — N39.0: Primary | ICD-10-CM

## 2019-02-11 DIAGNOSIS — Z88.2: ICD-10-CM

## 2019-02-11 DIAGNOSIS — Z79.02: ICD-10-CM

## 2019-02-11 DIAGNOSIS — Z79.899: ICD-10-CM

## 2019-02-11 DIAGNOSIS — Z86.73: ICD-10-CM

## 2019-02-11 DIAGNOSIS — Z87.442: ICD-10-CM

## 2019-02-11 DIAGNOSIS — Z86.14: ICD-10-CM

## 2019-02-11 DIAGNOSIS — M88.9: ICD-10-CM

## 2019-02-11 DIAGNOSIS — I50.9: ICD-10-CM

## 2019-02-11 DIAGNOSIS — F41.9: ICD-10-CM

## 2019-02-11 DIAGNOSIS — G47.33: ICD-10-CM

## 2019-02-11 DIAGNOSIS — Z85.528: ICD-10-CM

## 2019-02-11 DIAGNOSIS — I25.2: ICD-10-CM

## 2019-02-11 DIAGNOSIS — Z88.1: ICD-10-CM

## 2019-02-11 DIAGNOSIS — E11.9: ICD-10-CM

## 2019-02-11 DIAGNOSIS — R10.9: ICD-10-CM

## 2019-02-11 DIAGNOSIS — Z87.891: ICD-10-CM

## 2019-02-11 LAB
ALBUMIN SERPL BCG-MCNC: 4.1 GM/DL (ref 3.2–5.2)
ALT SERPL W P-5'-P-CCNC: 22 U/L (ref 12–78)
BASOPHILS # BLD AUTO: 0 10^3/UL (ref 0–0.2)
BASOPHILS NFR BLD AUTO: 0.1 % (ref 0–1)
BILIRUB CONJ SERPL-MCNC: < 0.1 MG/DL (ref 0–0.2)
BILIRUB SERPL-MCNC: 0.3 MG/DL (ref 0.2–1)
BUN SERPL-MCNC: 30 MG/DL (ref 7–18)
CALCIUM SERPL-MCNC: 8.9 MG/DL (ref 8.8–10.2)
CHLORIDE SERPL-SCNC: 105 MEQ/L (ref 98–107)
CO2 SERPL-SCNC: 29 MEQ/L (ref 21–32)
CREAT SERPL-MCNC: 1.58 MG/DL (ref 0.55–1.3)
EOSINOPHIL # BLD AUTO: 0 10^3/UL (ref 0–0.5)
EOSINOPHIL NFR BLD AUTO: 0 % (ref 0–3)
GFR SERPL CREATININE-BSD FRML MDRD: 33.9 ML/MIN/{1.73_M2} (ref 39–?)
GLUCOSE SERPL-MCNC: 121 MG/DL (ref 70–100)
HCT VFR BLD AUTO: 35.4 % (ref 36–47)
HGB BLD-MCNC: 11.7 G/DL (ref 12–15.5)
LIPASE SERPL-CCNC: 217 U/L (ref 73–393)
LYMPHOCYTES # BLD AUTO: 2.7 10^3/UL (ref 1.5–4.5)
LYMPHOCYTES NFR BLD AUTO: 29.9 % (ref 24–44)
MCH RBC QN AUTO: 35.8 PG (ref 27–33)
MCHC RBC AUTO-ENTMCNC: 33.1 G/DL (ref 32–36.5)
MCV RBC AUTO: 108.3 FL (ref 80–96)
MONOCYTES # BLD AUTO: 1.2 10^3/UL (ref 0–0.8)
MONOCYTES NFR BLD AUTO: 13.8 % (ref 0–5)
NEUTROPHILS # BLD AUTO: 5 10^3/UL (ref 1.8–7.7)
NEUTROPHILS NFR BLD AUTO: 55 % (ref 36–66)
PLATELET # BLD AUTO: 241 10^3/UL (ref 150–450)
POTASSIUM SERPL-SCNC: 4.3 MEQ/L (ref 3.5–5.1)
PROT SERPL-MCNC: 7.9 GM/DL (ref 6.4–8.2)
RBC # BLD AUTO: 3.27 10^6/UL (ref 4–5.4)
SODIUM SERPL-SCNC: 138 MEQ/L (ref 136–145)
WBC # BLD AUTO: 9 10^3/UL (ref 4–10)

## 2019-02-11 PROCEDURE — 80048 BASIC METABOLIC PNL TOTAL CA: CPT

## 2019-02-11 PROCEDURE — 99284 EMERGENCY DEPT VISIT MOD MDM: CPT

## 2019-02-11 PROCEDURE — 74176 CT ABD & PELVIS W/O CONTRAST: CPT

## 2019-02-11 PROCEDURE — 36415 COLL VENOUS BLD VENIPUNCTURE: CPT

## 2019-02-11 PROCEDURE — 96374 THER/PROPH/DIAG INJ IV PUSH: CPT

## 2019-02-11 PROCEDURE — 81001 URINALYSIS AUTO W/SCOPE: CPT

## 2019-02-11 PROCEDURE — 83690 ASSAY OF LIPASE: CPT

## 2019-02-11 PROCEDURE — 87086 URINE CULTURE/COLONY COUNT: CPT

## 2019-02-11 PROCEDURE — 85025 COMPLETE CBC W/AUTO DIFF WBC: CPT

## 2019-02-11 PROCEDURE — 80076 HEPATIC FUNCTION PANEL: CPT

## 2019-02-11 PROCEDURE — 96361 HYDRATE IV INFUSION ADD-ON: CPT

## 2019-02-11 NOTE — REP
CT of the abdomen and pelvis for left flank pain without IV and oral contrast:

The patient has history of cholecystectomy, left nephrectomy, appendectomy,

hysterectomy and sigmoid colon resection.

Comparison is 05/17/2018.

The visualized lung fields are unremarkable.

The unenhanced hepatic parenchyma, pancreas and spleen are unremarkable.

The adrenals are unremarkable.

There is a left nephrectomy.

There is no hydronephrosis or hydroureter on the right.  There is no right

ureteral calculus.

There are occasional diverticula in the descending colon.  There is no CT

evidence of diverticulitis or pericolonic abscess.

There has been sigmoid colon resection.  There are a few diverticula at the

rectosigmoid junction without evidence of diverticulitis or pericolonic abscess.

The abdominal aorta is unremarkable except for occasional calcified atheroma.

There is no retroperitoneal or mesenteric adenopathy.

There is no ascites.

There is no bowel distension or obstruction.

Pelvis:

There is no pelvic free fluid or adenopathy.

The vaginal cuff and adnexa are unremarkable.  The bladder is incompletely

distended and cannot be further evaluated.

There is degenerative disc disease in the lumbar spine at L for five and L5 S1.

Impression:

Left nephrectomy.

Sigmoid resection.

Occasional diverticula in the descending colon and at the rectosigmoid junction

of the colon without pericolonic inflammation or abscess.  No evidence of

diverticulitis.

Otherwise, negative CT of the abdomen

 

 

Electronically Signed by

Royce Devries MD 02/11/2019 04:58 P

## 2019-04-02 ENCOUNTER — HOSPITAL ENCOUNTER (OUTPATIENT)
Dept: HOSPITAL 53 - M LAB REF | Age: 74
End: 2019-04-02
Attending: INTERNAL MEDICINE
Payer: MEDICARE

## 2019-04-02 DIAGNOSIS — D64.9: Primary | ICD-10-CM

## 2019-04-02 LAB
FERRITIN SERPL-MCNC: 55 NG/ML (ref 8–252)
FOLATE SERPL-MCNC: 14.5 NG/ML
IRON SATN MFR SERPL: 45.6 % (ref 13.2–45)
IRON SERPL-MCNC: 141 UG/DL (ref 50–170)
TIBC SERPL-MCNC: 309 UG/DL (ref 250–450)
VIT B12 SERPL-MCNC: 390 PG/ML

## 2019-08-03 ENCOUNTER — HOSPITAL ENCOUNTER (EMERGENCY)
Dept: HOSPITAL 53 - M ED | Age: 74
Discharge: HOME | End: 2019-08-03
Payer: MEDICARE

## 2019-08-03 VITALS — BODY MASS INDEX: 28.14 KG/M2 | HEIGHT: 61 IN | WEIGHT: 149.03 LBS

## 2019-08-03 VITALS — DIASTOLIC BLOOD PRESSURE: 54 MMHG | SYSTOLIC BLOOD PRESSURE: 118 MMHG

## 2019-08-03 DIAGNOSIS — Z79.82: ICD-10-CM

## 2019-08-03 DIAGNOSIS — M85.821: ICD-10-CM

## 2019-08-03 DIAGNOSIS — Z91.018: ICD-10-CM

## 2019-08-03 DIAGNOSIS — I25.10: ICD-10-CM

## 2019-08-03 DIAGNOSIS — Z91.89: ICD-10-CM

## 2019-08-03 DIAGNOSIS — M25.511: ICD-10-CM

## 2019-08-03 DIAGNOSIS — M19.011: ICD-10-CM

## 2019-08-03 DIAGNOSIS — M88.9: ICD-10-CM

## 2019-08-03 DIAGNOSIS — M77.02: Primary | ICD-10-CM

## 2019-08-03 DIAGNOSIS — N18.9: ICD-10-CM

## 2019-08-03 DIAGNOSIS — Z87.891: ICD-10-CM

## 2019-08-03 DIAGNOSIS — Z91.02: ICD-10-CM

## 2019-08-03 DIAGNOSIS — I10: ICD-10-CM

## 2019-08-03 DIAGNOSIS — Z79.899: ICD-10-CM

## 2019-08-03 DIAGNOSIS — Z88.1: ICD-10-CM

## 2019-08-03 DIAGNOSIS — I25.2: ICD-10-CM

## 2019-08-03 DIAGNOSIS — E11.9: ICD-10-CM

## 2019-08-03 DIAGNOSIS — Z88.2: ICD-10-CM

## 2019-08-03 DIAGNOSIS — M79.10: ICD-10-CM

## 2019-08-03 DIAGNOSIS — Z88.6: ICD-10-CM

## 2019-08-03 LAB
ALBUMIN SERPL BCG-MCNC: 3.6 GM/DL (ref 3.2–5.2)
ALT SERPL W P-5'-P-CCNC: 21 U/L (ref 12–78)
BASOPHILS # BLD AUTO: 0 10^3/UL (ref 0–0.2)
BASOPHILS NFR BLD AUTO: 0.2 % (ref 0–1)
BILIRUB SERPL-MCNC: 0.3 MG/DL (ref 0.2–1)
BUN SERPL-MCNC: 34 MG/DL (ref 7–18)
CALCIUM SERPL-MCNC: 8.9 MG/DL (ref 8.8–10.2)
CHLORIDE SERPL-SCNC: 103 MEQ/L (ref 98–107)
CO2 SERPL-SCNC: 30 MEQ/L (ref 21–32)
CREAT SERPL-MCNC: 1.77 MG/DL (ref 0.55–1.3)
CRP SERPL-MCNC: 0.91 MG/DL (ref 0–0.3)
EOSINOPHIL # BLD AUTO: 0 10^3/UL (ref 0–0.5)
EOSINOPHIL NFR BLD AUTO: 0 % (ref 0–3)
ERYTHROCYTE [SEDIMENTATION RATE] IN BLOOD BY WESTERGREN METHOD: 56 MM/HR (ref 0–30)
GFR SERPL CREATININE-BSD FRML MDRD: 29.8 ML/MIN/{1.73_M2} (ref 39–?)
GLUCOSE SERPL-MCNC: 97 MG/DL (ref 70–100)
HCT VFR BLD AUTO: 31.5 % (ref 36–47)
HGB BLD-MCNC: 10.3 G/DL (ref 12–15.5)
LYMPHOCYTES # BLD AUTO: 1.5 10^3/UL (ref 1.5–4.5)
LYMPHOCYTES NFR BLD AUTO: 24.1 % (ref 24–44)
MCH RBC QN AUTO: 36 PG (ref 27–33)
MCHC RBC AUTO-ENTMCNC: 32.7 G/DL (ref 32–36.5)
MCV RBC AUTO: 110.1 FL (ref 80–96)
MONOCYTES # BLD AUTO: 1.4 10^3/UL (ref 0–0.8)
MONOCYTES NFR BLD AUTO: 21.7 % (ref 0–5)
NEUTROPHILS # BLD AUTO: 3.3 10^3/UL (ref 1.8–7.7)
NEUTROPHILS NFR BLD AUTO: 53.5 % (ref 36–66)
PLATELET # BLD AUTO: 176 10^3/UL (ref 150–450)
POTASSIUM SERPL-SCNC: 4.4 MEQ/L (ref 3.5–5.1)
PROT SERPL-MCNC: 6.9 GM/DL (ref 6.4–8.2)
RBC # BLD AUTO: 2.86 10^6/UL (ref 4–5.4)
SODIUM SERPL-SCNC: 139 MEQ/L (ref 136–145)
WBC # BLD AUTO: 6.2 10^3/UL (ref 4–10)

## 2019-08-03 NOTE — REP
Clinical:  Trauma.

 

Technique:  Internal rotation, external rotation, and Y view of the right

shoulder.

 

Findings:

Mild arthritic changes include cortical irregularity and spurring at the

acromioclavicular joint with subtle chondrocalcinosis.  Glenoid rim is intact and

normal.  Humeral head is relatively normal.  No acute fracture or dislocation.

Subacromial space is normal.

 

Impression:

Age-related osteopenia and mild arthritic changes primarily involving the

acromioclavicular joint.

 

 

Electronically Signed by

Beau Mendoza MD 08/03/2019 09:33 A

## 2019-08-03 NOTE — REP
Clinical: Pain .

 

Technique:  AP, lateral, bilateral oblique views of the left elbow.

 

Findings:

No acute fracture or dislocation is appreciated.  Joint spaces and surrounding

soft tissues appear normal.  Lateral view demonstrates normal positioning to the

anterior and posterior fat pads without evidence for effusion/hemarthrosis.  No

subcutaneous emphysema or foreign body identified.

 

Impression:

Normal age-appropriate left elbow radiographs.

 

 

Electronically Signed by

Beau Mendoza MD 08/03/2019 09:33 A

## 2019-08-05 LAB
25(OH)D3 SERPL-MCNC: 26.4 NG/ML (ref 30–100)
VIT B12 SERPL-MCNC: 428 PG/ML (ref 247–911)

## 2019-09-20 ENCOUNTER — HOSPITAL ENCOUNTER (OUTPATIENT)
Dept: HOSPITAL 53 - M RAD | Age: 74
End: 2019-09-20
Attending: INTERNAL MEDICINE
Payer: MEDICARE

## 2019-09-20 DIAGNOSIS — R10.84: Primary | ICD-10-CM

## 2019-09-20 PROCEDURE — 74176 CT ABD & PELVIS W/O CONTRAST: CPT

## 2019-09-20 NOTE — REP
CT of the abdomen and pelvis without IV and oral contrast for abdominal pain:

Comparison is 02/11/2019.

The patient has a history of renal carcinoma and a left nephrectomy.

Additionally, the patient has cholecystectomy, appendectomy, hysterectomy and

sigmoid colon resection.

 

The visualized lung fields are unremarkable except for small focal zone of

density medially in the right middle lobe as an change.  This could represent

acute infiltrate, atelectasis or scarring.

 

The unenhanced hepatic parenchyma, pancreas, spleen, right adrenal and right

kidney are unremarkable.

There is a left nephrectomy.  I do not identify the left adrenal  with certainty.

There is no mass in the left renal fossa.

The abdominal aorta is unremarkable except for calcified atheroma.  There is no

periaortic adenopathy.

There is no bowel distension or obstruction.

There is a surgical anastomotic suture ring in the sigmoid colon compatible with

sigmoid resection.

There are diverticula in the descending colon and rectosigmoid colon as

previously.  There is no CT evidence of acute diverticulitis or pericolonic

abscess.  There is no ascites.

Pelvis:

The vaginal cuff and adnexa are unremarkable.  The bladder is nondistended and

cannot be evaluated.  There is no pelvic adenopathy or ascites.

There is degenerative disc disease in the lumbar spine L4-5 and L5 S1.  There is

a Schmorl's node in the inferior endplate of L4.

Impression:

No acute findings.  See except for a new density medially in the right middle

lobe, nonspecific, atelectasis, infiltrate versus scarring.

Left nephrectomy.  No tumor recurrence in the left renal fossa.

No bowel distension or obstruction.

Diverticulosis without diverticulitis.

No ascites or adenopathy.

 

 

Electronically Signed by

Royce Devries MD 09/20/2019 10:37 A

## 2019-10-02 ENCOUNTER — HOSPITAL ENCOUNTER (EMERGENCY)
Dept: HOSPITAL 53 - M ED | Age: 74
Discharge: HOME | End: 2019-10-02
Payer: MEDICARE

## 2019-10-02 VITALS — SYSTOLIC BLOOD PRESSURE: 111 MMHG | DIASTOLIC BLOOD PRESSURE: 52 MMHG

## 2019-10-02 VITALS — WEIGHT: 152.32 LBS | BODY MASS INDEX: 28.76 KG/M2 | HEIGHT: 61 IN

## 2019-10-02 DIAGNOSIS — I25.2: ICD-10-CM

## 2019-10-02 DIAGNOSIS — I45.10: ICD-10-CM

## 2019-10-02 DIAGNOSIS — M88.9: ICD-10-CM

## 2019-10-02 DIAGNOSIS — I51.9: ICD-10-CM

## 2019-10-02 DIAGNOSIS — D64.9: ICD-10-CM

## 2019-10-02 DIAGNOSIS — Z88.2: ICD-10-CM

## 2019-10-02 DIAGNOSIS — Z79.02: ICD-10-CM

## 2019-10-02 DIAGNOSIS — Z88.1: ICD-10-CM

## 2019-10-02 DIAGNOSIS — Z91.048: ICD-10-CM

## 2019-10-02 DIAGNOSIS — Z88.6: ICD-10-CM

## 2019-10-02 DIAGNOSIS — K52.9: ICD-10-CM

## 2019-10-02 DIAGNOSIS — K21.9: ICD-10-CM

## 2019-10-02 DIAGNOSIS — Z79.899: ICD-10-CM

## 2019-10-02 DIAGNOSIS — Z79.82: ICD-10-CM

## 2019-10-02 DIAGNOSIS — Z88.8: ICD-10-CM

## 2019-10-02 DIAGNOSIS — J44.9: ICD-10-CM

## 2019-10-02 DIAGNOSIS — R53.1: Primary | ICD-10-CM

## 2019-10-02 DIAGNOSIS — R42: ICD-10-CM

## 2019-10-02 DIAGNOSIS — Z95.1: ICD-10-CM

## 2019-10-02 LAB
ALBUMIN SERPL BCG-MCNC: 3.5 GM/DL (ref 3.2–5.2)
ALT SERPL W P-5'-P-CCNC: 19 U/L (ref 12–78)
BASOPHILS # BLD AUTO: 0 10^3/UL (ref 0–0.2)
BASOPHILS NFR BLD AUTO: 0.2 % (ref 0–1)
BILIRUB CONJ SERPL-MCNC: < 0.1 MG/DL (ref 0–0.2)
BILIRUB SERPL-MCNC: 0.3 MG/DL (ref 0.2–1)
BUN SERPL-MCNC: 33 MG/DL (ref 7–18)
CALCIUM SERPL-MCNC: 8.8 MG/DL (ref 8.8–10.2)
CHLORIDE SERPL-SCNC: 104 MEQ/L (ref 98–107)
CK MB CFR.DF SERPL CALC: 2.37
CK MB SERPL-MCNC: 1.8 NG/ML (ref ?–3.6)
CK SERPL-CCNC: 76 U/L (ref 26–192)
CO2 SERPL-SCNC: 27 MEQ/L (ref 21–32)
CREAT SERPL-MCNC: 1.69 MG/DL (ref 0.55–1.3)
EOSINOPHIL # BLD AUTO: 0 10^3/UL (ref 0–0.5)
EOSINOPHIL NFR BLD AUTO: 0.2 % (ref 0–3)
GFR SERPL CREATININE-BSD FRML MDRD: 31.4 ML/MIN/{1.73_M2} (ref 39–?)
GLUCOSE SERPL-MCNC: 95 MG/DL (ref 70–100)
HCT VFR BLD AUTO: 28.5 % (ref 36–47)
HGB BLD-MCNC: 9.5 G/DL (ref 12–15.5)
LYMPHOCYTES # BLD AUTO: 1.9 10^3/UL (ref 1.5–5)
LYMPHOCYTES NFR BLD AUTO: 31.6 % (ref 24–44)
MCH RBC QN AUTO: 37.5 PG (ref 27–33)
MCHC RBC AUTO-ENTMCNC: 33.3 G/DL (ref 32–36.5)
MCV RBC AUTO: 112.6 FL (ref 80–96)
MONOCYTES # BLD AUTO: 0.9 10^3/UL (ref 0–0.8)
MONOCYTES NFR BLD AUTO: 15 % (ref 0–5)
NEUTROPHILS # BLD AUTO: 3.1 10^3/UL (ref 1.5–8.5)
NEUTROPHILS NFR BLD AUTO: 51.7 % (ref 36–66)
PLATELET # BLD AUTO: 168 10^3/UL (ref 150–450)
POTASSIUM SERPL-SCNC: 5.4 MEQ/L (ref 3.5–5.1)
PROT SERPL-MCNC: 6.7 GM/DL (ref 6.4–8.2)
RBC # BLD AUTO: 2.53 10^6/UL (ref 4–5.4)
SODIUM SERPL-SCNC: 137 MEQ/L (ref 136–145)
TROPONIN I SERPL-MCNC: < 0.02 NG/ML (ref ?–0.1)
TSH SERPL DL<=0.005 MIU/L-ACNC: 3.94 UIU/ML (ref 0.36–3.74)
WBC # BLD AUTO: 6 10^3/UL (ref 4–10)

## 2019-10-02 NOTE — REP
CT brain without contrast:

 

History:  Weakness.

 

Comparison brain CT study May 29, 2013.

 

Findings:  Digital preliminary  radiograph is unremarkable.  The patient is

edentulous.  The bony calvarium is intact on axial CT images.  There is heavy

vascular calcification in the distal carotid arteries bilaterally.  On

soft-tissue window settings, there is mild to moderate generalized volume loss.

There is no evidence of intracranial hemorrhage.  No extra-axial fluid collection

is seen.  There is no evidence of acute infarction.

 

Impression:

 

Heavy vascular calcification.  Diffuse atrophy.  No acute intracranial

abnormality.

 

 

Electronically Signed by

Hang Tolbert MD 10/03/2019 08:01 A

## 2019-10-02 NOTE — ECGEPIP
[facility] - ED

                                       

                                       Test Date:    2019-10-02

Pat Name:     LORI TOWNSEND            Department:   

Patient ID:   [id number]                 Room:         -

Gender:       Female                   Technician:   ct

:          1945               Requested By: Nahed Koroma 

Order Number: [id number]     Reading MD:   Nahed Koroma

                                 Measurements

Intervals                              Axis          

Rate:         61                       P:            59

PA:           200                      QRS:          -42

QRSD:         161                      T:            52

QT:           477                                    

QTc:          482                                    

                           Interpretive Statements

SINUS RHYTHM WITH OCCASIONAL SUPRAVENTRICULAR PREMATURE COMPLEXES

MARKED LEFT AXIS DEVIATION

RIGHT BUNDLE BRANCH BLOCK

NO PRIOR

Electronically Signed on 10-2-2019 20:48:27 EDT by Nahed Koroma

## 2019-10-08 ENCOUNTER — HOSPITAL ENCOUNTER (OUTPATIENT)
Dept: HOSPITAL 53 - M LABNEURO | Age: 74
End: 2019-10-08
Attending: PSYCHIATRY & NEUROLOGY
Payer: MEDICARE

## 2019-10-08 DIAGNOSIS — Z79.899: ICD-10-CM

## 2019-10-08 DIAGNOSIS — Z79.82: ICD-10-CM

## 2019-10-08 DIAGNOSIS — R42: ICD-10-CM

## 2019-10-08 DIAGNOSIS — R51: Primary | ICD-10-CM

## 2019-10-08 LAB
25(OH)D3 SERPL-MCNC: 23.9 NG/ML (ref 30–100)
ALBUMIN SERPL BCG-MCNC: 3.8 GM/DL (ref 3.2–5.2)
ALT SERPL W P-5'-P-CCNC: 24 U/L (ref 12–78)
BASOPHILS # BLD AUTO: 0 10^3/UL (ref 0–0.2)
BASOPHILS NFR BLD AUTO: 0.1 % (ref 0–1)
BILIRUB SERPL-MCNC: 0.4 MG/DL (ref 0.2–1)
BUN SERPL-MCNC: 32 MG/DL (ref 7–18)
CALCIUM SERPL-MCNC: 8.5 MG/DL (ref 8.8–10.2)
CHLORIDE SERPL-SCNC: 104 MEQ/L (ref 98–107)
CO2 SERPL-SCNC: 28 MEQ/L (ref 21–32)
CREAT SERPL-MCNC: 1.65 MG/DL (ref 0.55–1.3)
EOSINOPHIL # BLD AUTO: 0 10^3/UL (ref 0–0.5)
EOSINOPHIL NFR BLD AUTO: 0.1 % (ref 0–3)
ERYTHROCYTE [SEDIMENTATION RATE] IN BLOOD BY WESTERGREN METHOD: 73 MM/HR (ref 0–30)
GFR SERPL CREATININE-BSD FRML MDRD: 32.3 ML/MIN/{1.73_M2} (ref 39–?)
GLUCOSE SERPL-MCNC: 82 MG/DL (ref 70–100)
HCT VFR BLD AUTO: 30.8 % (ref 36–47)
HGB BLD-MCNC: 10.1 G/DL (ref 12–15.5)
LYMPHOCYTES # BLD AUTO: 2.5 10^3/UL (ref 1.5–5)
LYMPHOCYTES NFR BLD AUTO: 27.9 % (ref 24–44)
MACROCYTES BLD QL SMEAR: (no result)
MCH RBC QN AUTO: 37.5 PG (ref 27–33)
MCHC RBC AUTO-ENTMCNC: 32.8 G/DL (ref 32–36.5)
MCV RBC AUTO: 114.5 FL (ref 80–96)
MONOCYTES # BLD AUTO: 1.5 10^3/UL (ref 0–0.8)
MONOCYTES NFR BLD AUTO: 16.5 % (ref 0–5)
NEUTROPHILS # BLD AUTO: 4.8 10^3/UL (ref 1.5–8.5)
NEUTROPHILS NFR BLD AUTO: 54.1 % (ref 36–66)
PLATELET # BLD AUTO: 190 10^3/UL (ref 150–450)
PLATELET BLD QL SMEAR: NORMAL
POTASSIUM SERPL-SCNC: 4.7 MEQ/L (ref 3.5–5.1)
PROT SERPL-MCNC: 7.1 GM/DL (ref 6.4–8.2)
RBC # BLD AUTO: 2.69 10^6/UL (ref 4–5.4)
RHEUMATOID FACT SERPL-ACNC: < 10 IU/ML (ref ?–15)
SODIUM SERPL-SCNC: 138 MEQ/L (ref 136–145)
TSH SERPL DL<=0.005 MIU/L-ACNC: 3.72 UIU/ML (ref 0.36–3.74)
WBC # BLD AUTO: 8.8 10^3/UL (ref 4–10)

## 2019-10-16 ENCOUNTER — HOSPITAL ENCOUNTER (OUTPATIENT)
Dept: HOSPITAL 53 - M RAD | Age: 74
End: 2019-10-16
Attending: PSYCHIATRY & NEUROLOGY
Payer: MEDICARE

## 2019-10-16 DIAGNOSIS — Z53.9: Primary | ICD-10-CM

## 2019-10-25 ENCOUNTER — HOSPITAL ENCOUNTER (EMERGENCY)
Dept: HOSPITAL 53 - M ED | Age: 74
Discharge: HOME | End: 2019-10-25
Payer: MEDICARE

## 2019-10-25 VITALS — DIASTOLIC BLOOD PRESSURE: 53 MMHG | SYSTOLIC BLOOD PRESSURE: 117 MMHG

## 2019-10-25 VITALS — BODY MASS INDEX: 29.76 KG/M2 | WEIGHT: 157.63 LBS | HEIGHT: 61 IN

## 2019-10-25 DIAGNOSIS — M88.9: ICD-10-CM

## 2019-10-25 DIAGNOSIS — F41.9: ICD-10-CM

## 2019-10-25 DIAGNOSIS — Z91.048: ICD-10-CM

## 2019-10-25 DIAGNOSIS — Z91.018: ICD-10-CM

## 2019-10-25 DIAGNOSIS — Z79.82: ICD-10-CM

## 2019-10-25 DIAGNOSIS — Z88.8: ICD-10-CM

## 2019-10-25 DIAGNOSIS — K21.9: ICD-10-CM

## 2019-10-25 DIAGNOSIS — Z79.899: ICD-10-CM

## 2019-10-25 DIAGNOSIS — G47.33: ICD-10-CM

## 2019-10-25 DIAGNOSIS — Z88.2: ICD-10-CM

## 2019-10-25 DIAGNOSIS — J44.9: ICD-10-CM

## 2019-10-25 DIAGNOSIS — K57.32: Primary | ICD-10-CM

## 2019-10-25 DIAGNOSIS — Z78.0: ICD-10-CM

## 2019-10-25 DIAGNOSIS — Z88.1: ICD-10-CM

## 2019-10-25 DIAGNOSIS — E11.9: ICD-10-CM

## 2019-10-25 DIAGNOSIS — Z79.02: ICD-10-CM

## 2019-10-25 LAB
ALBUMIN SERPL BCG-MCNC: 3.5 GM/DL (ref 3.2–5.2)
ALT SERPL W P-5'-P-CCNC: 19 U/L (ref 12–78)
ANISOCYTOSIS BLD QL SMEAR: (no result)
APTT BLD: 27.7 SECONDS (ref 25–38.4)
BASOPHILS # BLD AUTO: 0 10^3/UL (ref 0–0.2)
BASOPHILS NFR BLD AUTO: 0.3 % (ref 0–1)
BILIRUB CONJ SERPL-MCNC: < 0.1 MG/DL (ref 0–0.2)
BILIRUB SERPL-MCNC: 0.2 MG/DL (ref 0.2–1)
BUN SERPL-MCNC: 31 MG/DL (ref 7–18)
CALCIUM SERPL-MCNC: 9 MG/DL (ref 8.8–10.2)
CHLORIDE SERPL-SCNC: 105 MEQ/L (ref 98–107)
CO2 SERPL-SCNC: 31 MEQ/L (ref 21–32)
CREAT SERPL-MCNC: 1.51 MG/DL (ref 0.55–1.3)
EOSINOPHIL # BLD AUTO: 0 10^3/UL (ref 0–0.5)
EOSINOPHIL NFR BLD AUTO: 0.1 % (ref 0–3)
GFR SERPL CREATININE-BSD FRML MDRD: 35.8 ML/MIN/{1.73_M2} (ref 39–?)
GLUCOSE SERPL-MCNC: 114 MG/DL (ref 70–100)
HCT VFR BLD AUTO: 27.6 % (ref 36–47)
HGB BLD-MCNC: 9.2 G/DL (ref 12–15.5)
INR PPP: 1.03
LIPASE SERPL-CCNC: 191 U/L (ref 73–393)
LYMPHOCYTES # BLD AUTO: 2.4 10^3/UL (ref 1.5–5)
LYMPHOCYTES NFR BLD AUTO: 29.9 % (ref 24–44)
MACROCYTES BLD QL SMEAR: (no result)
MCH RBC QN AUTO: 38.2 PG (ref 27–33)
MCHC RBC AUTO-ENTMCNC: 33.3 G/DL (ref 32–36.5)
MCV RBC AUTO: 114.5 FL (ref 80–96)
MONOCYTES # BLD AUTO: 1.5 10^3/UL (ref 0–0.8)
MONOCYTES NFR BLD AUTO: 18.6 % (ref 0–5)
NEUTROPHILS # BLD AUTO: 3.9 10^3/UL (ref 1.5–8.5)
NEUTROPHILS NFR BLD AUTO: 49 % (ref 36–66)
OVALOCYTES BLD QL SMEAR: (no result)
PLATELET # BLD AUTO: 199 10^3/UL (ref 150–450)
PLATELET BLD QL SMEAR: NORMAL
POLYCHROMASIA BLD QL SMEAR: (no result)
POTASSIUM SERPL-SCNC: 5 MEQ/L (ref 3.5–5.1)
PROT SERPL-MCNC: 7 GM/DL (ref 6.4–8.2)
PROTHROMBIN TIME: 13.2 SECONDS (ref 11.8–14)
RBC # BLD AUTO: 2.41 10^6/UL (ref 4–5.4)
SODIUM SERPL-SCNC: 140 MEQ/L (ref 136–145)
WBC # BLD AUTO: 8 10^3/UL (ref 4–10)

## 2019-10-25 PROCEDURE — 85610 PROTHROMBIN TIME: CPT

## 2019-10-25 PROCEDURE — 74177 CT ABD & PELVIS W/CONTRAST: CPT

## 2019-10-25 PROCEDURE — 80048 BASIC METABOLIC PNL TOTAL CA: CPT

## 2019-10-25 PROCEDURE — 83690 ASSAY OF LIPASE: CPT

## 2019-10-25 PROCEDURE — 96374 THER/PROPH/DIAG INJ IV PUSH: CPT

## 2019-10-25 PROCEDURE — 36415 COLL VENOUS BLD VENIPUNCTURE: CPT

## 2019-10-25 PROCEDURE — 99284 EMERGENCY DEPT VISIT MOD MDM: CPT

## 2019-10-25 PROCEDURE — 96375 TX/PRO/DX INJ NEW DRUG ADDON: CPT

## 2019-10-25 PROCEDURE — 87086 URINE CULTURE/COLONY COUNT: CPT

## 2019-10-25 PROCEDURE — 85025 COMPLETE CBC W/AUTO DIFF WBC: CPT

## 2019-10-25 PROCEDURE — 83605 ASSAY OF LACTIC ACID: CPT

## 2019-10-25 PROCEDURE — 85730 THROMBOPLASTIN TIME PARTIAL: CPT

## 2019-10-25 PROCEDURE — 81001 URINALYSIS AUTO W/SCOPE: CPT

## 2019-10-25 PROCEDURE — 96361 HYDRATE IV INFUSION ADD-ON: CPT

## 2019-10-25 PROCEDURE — 80076 HEPATIC FUNCTION PANEL: CPT

## 2019-10-25 NOTE — REPVR
PROCEDURE INFORMATION: 

Exam: CT Abdomen And Pelvis With Contrast 

Exam date and time: 10/25/2019 6:51 PM 

Clinical history: 73 years old, female; Abdominal pain; Localized; Left lower 

quadrant (llq); Additional info: Llq pain 



TECHNIQUE: 

Imaging protocol: Computed tomography of the abdomen and pelvis with 

intravenous contrast. 

Radiation optimization: All CT scans at this facility use at least one of these 

dose optimization techniques: automated exposure control; mA and/or kV 

adjustment per patient size (includes targeted exams where dose is matched to 

clinical indication); or iterative reconstruction. 

Contrast material: ISOVUE 370; Contrast volume: 100 ml; Contrast route: IV;  



COMPARISON: 

CT ABD/PEL W/PO CONTRAST ONLY 9/20/2019 9:29 AM 



FINDINGS: 



Liver: Enlarged low attenuating liver, evidence of hepatic steatosis. 

Gallbladder and bile ducts: Normal. No calcified stones. No ductal dilation. 

Pancreas: Normal. No ductal dilation. 

Spleen: Normal. No splenomegaly. 

Adrenals: Mild left adrenal gland thickening. 

Kidneys and ureters: Normal. No hydronephrosis. 

Stomach and bowel: Sigmoid bowel anastomosis. Stranding and inflammation around 

the sigmoid colon, with numerous diverticula. Evidence for mild acute sigmoid 

diverticulitis. 

Appendix: No evidence of appendicitis. 

Intraperitoneal space: Unremarkable. No free air. No significant fluid 

collection. 

Vasculature: Left common iliac artery stent patent with moderate severe 

narrowing and partial collapse proximally. Patent right common iliac artery 

stent. 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 

Lymph nodes: Unremarkable. No enlarged lymph nodes. 



Bladder: Unremarkable as visualized. 

Reproductive: Hysterectomy. 

Bones/joints: Moderate lumbar spondylosis. 

Soft tissues: Unremarkable. 



IMPRESSION: 

Sigmoid bowel anastomosis. Stranding and inflammation around the sigmoid colon, 

with numerous diverticula. Evidence for mild acute sigmoid diverticulitis. 



Electronically signed by: Dennis Boggs On 10/25/2019  19:45:46 PM

## 2019-10-29 ENCOUNTER — HOSPITAL ENCOUNTER (OUTPATIENT)
Dept: HOSPITAL 53 - M RAD | Age: 74
End: 2019-10-29
Attending: PSYCHIATRY & NEUROLOGY
Payer: MEDICARE

## 2019-10-29 DIAGNOSIS — R42: Primary | ICD-10-CM

## 2019-10-29 PROCEDURE — 70498 CT ANGIOGRAPHY NECK: CPT

## 2019-10-29 PROCEDURE — 70496 CT ANGIOGRAPHY HEAD: CPT

## 2019-10-29 NOTE — REP
Intra and extracranial CTA:  10/29/2019.

 

Indication:  Dizziness.

 

Comparison:  10/19/2018.

 

Technique:  High resolution axial images of the intra and extracranial

circulations were performed following administration of 100 ml IV IV contrast.

Coronal, sagittal and 3-D reconstructions were provided.

 

Findings:

 

There is no intracranial high-grade stenosis, vessel occlusion, aneurysm or AVM.

Moderate atherosclerotic narrowing of the cavernous ICAs bilaterally is present.

 

Postoperative sequelae of the carotid bulbs/proximal ICAs are present

bilaterally.  There is no hemodynamically significant extracranial ICA stenosis

by NASCET criteria.  The great vessels originate in expected anatomic fashion

from the aortic arch.  There is moderate atherosclerotic narrowing of the

vertebral artery origins.

 

Centrilobular emphysema sequelae of the lungs is noted.

 

Impression:

 

No high-grade stenosis or vessel occlusion.

 

 

Electronically Signed by

Osmany Stout DO 10/29/2019 11:40 A

## 2019-12-28 ENCOUNTER — HOSPITAL ENCOUNTER (OUTPATIENT)
Dept: HOSPITAL 53 - M LAB | Age: 74
End: 2019-12-28
Attending: PHYSICIAN ASSISTANT
Payer: MEDICARE

## 2019-12-28 DIAGNOSIS — I25.10: Primary | ICD-10-CM

## 2019-12-28 LAB
BASOPHILS # BLD AUTO: 0 10^3/UL (ref 0–0.2)
BASOPHILS NFR BLD AUTO: 0.1 % (ref 0–1)
BUN SERPL-MCNC: 33 MG/DL (ref 7–18)
CALCIUM SERPL-MCNC: 9.3 MG/DL (ref 8.8–10.2)
CHLORIDE SERPL-SCNC: 104 MEQ/L (ref 98–107)
CO2 SERPL-SCNC: 25 MEQ/L (ref 21–32)
CREAT SERPL-MCNC: 1.71 MG/DL (ref 0.55–1.3)
EOSINOPHIL # BLD AUTO: 0 10^3/UL (ref 0–0.5)
EOSINOPHIL NFR BLD AUTO: 0.1 % (ref 0–3)
GFR SERPL CREATININE-BSD FRML MDRD: 30.9 ML/MIN/{1.73_M2} (ref 39–?)
GLUCOSE SERPL-MCNC: 105 MG/DL (ref 70–100)
HCT VFR BLD AUTO: 33.4 % (ref 36–47)
HGB BLD-MCNC: 11.2 G/DL (ref 12–15.5)
LYMPHOCYTES # BLD AUTO: 2.4 10^3/UL (ref 1.5–5)
LYMPHOCYTES NFR BLD AUTO: 31.7 % (ref 24–44)
MCH RBC QN AUTO: 37.1 PG (ref 27–33)
MCHC RBC AUTO-ENTMCNC: 33.5 G/DL (ref 32–36.5)
MCV RBC AUTO: 110.6 FL (ref 80–96)
MONOCYTES # BLD AUTO: 1 10^3/UL (ref 0–0.8)
MONOCYTES NFR BLD AUTO: 13.7 % (ref 0–5)
NEUTROPHILS # BLD AUTO: 4 10^3/UL (ref 1.5–8.5)
NEUTROPHILS NFR BLD AUTO: 53.3 % (ref 36–66)
PLATELET # BLD AUTO: 201 10^3/UL (ref 150–450)
POTASSIUM SERPL-SCNC: 4.4 MEQ/L (ref 3.5–5.1)
RBC # BLD AUTO: 3.02 10^6/UL (ref 4–5.4)
SODIUM SERPL-SCNC: 138 MEQ/L (ref 136–145)
WBC # BLD AUTO: 7.5 10^3/UL (ref 4–10)

## 2020-01-10 ENCOUNTER — HOSPITAL ENCOUNTER (OUTPATIENT)
Dept: HOSPITAL 53 - M LAB REF | Age: 75
End: 2020-01-10
Attending: INTERNAL MEDICINE
Payer: MEDICARE

## 2020-01-10 DIAGNOSIS — Z90.5: ICD-10-CM

## 2020-01-10 DIAGNOSIS — N18.3: Primary | ICD-10-CM

## 2020-01-10 DIAGNOSIS — D51.9: ICD-10-CM

## 2020-01-10 LAB
ALBUMIN SERPL BCG-MCNC: 3.8 GM/DL (ref 3.2–5.2)
BUN SERPL-MCNC: 30 MG/DL (ref 7–18)
CALCIUM SERPL-MCNC: 9.2 MG/DL (ref 8.8–10.2)
CHLORIDE SERPL-SCNC: 102 MEQ/L (ref 98–107)
CO2 SERPL-SCNC: 28 MEQ/L (ref 21–32)
CREAT SERPL-MCNC: 1.73 MG/DL (ref 0.55–1.3)
GFR SERPL CREATININE-BSD FRML MDRD: 30.5 ML/MIN/{1.73_M2} (ref 39–?)
GLUCOSE SERPL-MCNC: 160 MG/DL (ref 70–100)
MAGNESIUM SERPL-MCNC: 2.1 MG/DL (ref 1.8–2.4)
PHOSPHATE SERPL-MCNC: 2.7 MG/DL (ref 2.5–4.9)
POTASSIUM SERPL-SCNC: 4.6 MEQ/L (ref 3.5–5.1)
SODIUM SERPL-SCNC: 138 MEQ/L (ref 136–145)
URATE SERPL-MCNC: 6.2 MG/DL (ref 2.6–6)

## 2020-01-13 ENCOUNTER — HOSPITAL ENCOUNTER (OUTPATIENT)
Dept: HOSPITAL 53 - M LAB REF | Age: 75
End: 2020-01-13
Attending: INTERNAL MEDICINE
Payer: MEDICARE

## 2020-01-13 DIAGNOSIS — I12.9: Primary | ICD-10-CM

## 2020-01-18 LAB
ALDOST SERPL-MCNC: 7.5 NG/DL (ref 0–30)
METANEPH FREE SERPL-MCNC: 55 PG/ML (ref 0–62)
NORMETANEPHRINE SERPL-MCNC: 370 PG/ML (ref 0–145)

## 2020-03-13 ENCOUNTER — HOSPITAL ENCOUNTER (OUTPATIENT)
Dept: HOSPITAL 53 - M RAD | Age: 75
End: 2020-03-13
Attending: INTERNAL MEDICINE
Payer: MEDICARE

## 2020-03-13 DIAGNOSIS — I12.9: Primary | ICD-10-CM

## 2020-03-13 DIAGNOSIS — N18.3: ICD-10-CM

## 2020-03-13 PROCEDURE — 71275 CT ANGIOGRAPHY CHEST: CPT

## 2020-03-13 NOTE — REP
CT PULMONARY ANGIOGRAM:  With IV contrast.

 

HISTORY:  Chest pain.  Hypertension chronic kidney disease.

 

COMPARISON STUDIES: No comparison CT study.

 

CONTRAST DOSE:  75 mL of Isovue 370 are administered intravenously.

 

CT TECHNIQUE:  Helical scanning is acquired and overlapping 1.5 mm and contiguous

3 mm axial images are reformatted.  In addition, maximum intensity projection and

multiplanar re-formation images are generated in sagittal and coronal imaging

projections.

 

CT PULMONARY ANGIOGRAPHIC FINDINGS: There is good opacification of the pulmonary

arterial tree.  There is no CT evidence of pulmonary embolus.  Vascular

calcification is noted.  The patient is status post median sternotomy.  Coronary

artery bypass grafting.  There is no evidence of pleural or pericardial effusion.

No adrenal mass is seen on either side.  The left kidney appears to be surgically

absent.  No extrathoracic mass or adenopathy is seen.  There are emphysematous

changes moderate in degree in the upper lobes bilaterally.  There is some linear

fibrosis in the right middle lobe.  Bilateral lower lobe fibrosis is seen.  There

is mild bronchiectasis in the left lower lobe.  No pulmonary mass or focal

infiltrate is seen.  No hilar or mediastinal mass or adenopathy is observed.

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

No acute bony abnormality is appreciated.  There are degenerative disc changes in

the thoracic spine.

 

IMPRESSION:

 

No CT evidence of pulmonary embolus.  No active cardiopulmonary disease.  Prior

sternotomy and coronary artery surgery.  Cardiomegaly.  Prior left nephrectomy.

 

 

 

Electronically Signed by

Hang Tolbert MD 03/13/2020 11:12 A

## 2020-04-18 ENCOUNTER — HOSPITAL ENCOUNTER (OUTPATIENT)
Dept: HOSPITAL 53 - M LABSMTC | Age: 75
End: 2020-04-18
Attending: FAMILY MEDICINE
Payer: MEDICARE

## 2020-04-18 DIAGNOSIS — Z11.59: Primary | ICD-10-CM

## 2020-04-18 DIAGNOSIS — Z20.828: ICD-10-CM

## 2021-07-29 ENCOUNTER — HOSPITAL ENCOUNTER (OUTPATIENT)
Dept: HOSPITAL 53 - M PLALAB | Age: 76
End: 2021-07-29
Attending: STUDENT IN AN ORGANIZED HEALTH CARE EDUCATION/TRAINING PROGRAM
Payer: MEDICARE

## 2021-07-29 DIAGNOSIS — E78.00: Primary | ICD-10-CM

## 2021-07-29 DIAGNOSIS — Z86.39: ICD-10-CM

## 2021-07-29 LAB
CHOLEST SERPL-MCNC: 191 MG/DL (ref ?–200)
CHOLEST/HDLC SERPL: 4.44 {RATIO} (ref ?–5)
HDLC SERPL-MCNC: 43 MG/DL (ref 40–?)
LDLC SERPL CALC-MCNC: 85 MG/DL (ref ?–100)
NONHDLC SERPL-MCNC: 148 MG/DL
TRIGL SERPL-MCNC: 316 MG/DL (ref ?–150)
VIT B12 SERPL-MCNC: 689 PG/ML (ref 247–911)

## 2021-09-29 ENCOUNTER — HOSPITAL ENCOUNTER (OUTPATIENT)
Dept: HOSPITAL 53 - M LAB REF | Age: 76
End: 2021-09-29
Attending: INTERNAL MEDICINE
Payer: MEDICARE

## 2021-09-29 DIAGNOSIS — N18.32: Primary | ICD-10-CM

## 2021-10-15 ENCOUNTER — HOSPITAL ENCOUNTER (OUTPATIENT)
Dept: HOSPITAL 53 - M LAB REF | Age: 76
End: 2021-10-15
Attending: INTERNAL MEDICINE
Payer: MEDICARE

## 2021-10-15 DIAGNOSIS — N18.32: Primary | ICD-10-CM

## 2021-10-26 ENCOUNTER — HOSPITAL ENCOUNTER (INPATIENT)
Dept: HOSPITAL 53 - M ED | Age: 76
LOS: 6 days | Discharge: HOME HEALTH | DRG: 177 | End: 2021-11-01
Attending: INTERNAL MEDICINE | Admitting: INTERNAL MEDICINE
Payer: MEDICARE

## 2021-10-26 VITALS — SYSTOLIC BLOOD PRESSURE: 145 MMHG | DIASTOLIC BLOOD PRESSURE: 64 MMHG

## 2021-10-26 VITALS — BODY MASS INDEX: 30.34 KG/M2 | WEIGHT: 160.72 LBS | HEIGHT: 61 IN

## 2021-10-26 VITALS — SYSTOLIC BLOOD PRESSURE: 193 MMHG | DIASTOLIC BLOOD PRESSURE: 78 MMHG

## 2021-10-26 DIAGNOSIS — Z79.82: ICD-10-CM

## 2021-10-26 DIAGNOSIS — M10.9: ICD-10-CM

## 2021-10-26 DIAGNOSIS — G43.909: ICD-10-CM

## 2021-10-26 DIAGNOSIS — E87.2: ICD-10-CM

## 2021-10-26 DIAGNOSIS — G47.33: ICD-10-CM

## 2021-10-26 DIAGNOSIS — F41.9: ICD-10-CM

## 2021-10-26 DIAGNOSIS — I13.0: ICD-10-CM

## 2021-10-26 DIAGNOSIS — Z88.2: ICD-10-CM

## 2021-10-26 DIAGNOSIS — E83.42: ICD-10-CM

## 2021-10-26 DIAGNOSIS — Z91.048: ICD-10-CM

## 2021-10-26 DIAGNOSIS — Z91.018: ICD-10-CM

## 2021-10-26 DIAGNOSIS — Z88.1: ICD-10-CM

## 2021-10-26 DIAGNOSIS — Z79.899: ICD-10-CM

## 2021-10-26 DIAGNOSIS — J12.82: ICD-10-CM

## 2021-10-26 DIAGNOSIS — Z85.54: ICD-10-CM

## 2021-10-26 DIAGNOSIS — Z95.820: ICD-10-CM

## 2021-10-26 DIAGNOSIS — D61.818: ICD-10-CM

## 2021-10-26 DIAGNOSIS — Z88.8: ICD-10-CM

## 2021-10-26 DIAGNOSIS — I50.9: ICD-10-CM

## 2021-10-26 DIAGNOSIS — N18.9: ICD-10-CM

## 2021-10-26 DIAGNOSIS — F32.A: ICD-10-CM

## 2021-10-26 DIAGNOSIS — I73.9: ICD-10-CM

## 2021-10-26 DIAGNOSIS — Z90.5: ICD-10-CM

## 2021-10-26 DIAGNOSIS — Z79.02: ICD-10-CM

## 2021-10-26 DIAGNOSIS — E78.5: ICD-10-CM

## 2021-10-26 DIAGNOSIS — Z90.49: ICD-10-CM

## 2021-10-26 DIAGNOSIS — K21.9: ICD-10-CM

## 2021-10-26 DIAGNOSIS — Z95.5: ICD-10-CM

## 2021-10-26 DIAGNOSIS — I25.119: ICD-10-CM

## 2021-10-26 DIAGNOSIS — Z87.891: ICD-10-CM

## 2021-10-26 DIAGNOSIS — U07.1: Primary | ICD-10-CM

## 2021-10-26 DIAGNOSIS — R19.7: ICD-10-CM

## 2021-10-26 LAB
ALBUMIN SERPL BCG-MCNC: 3.3 GM/DL (ref 3.2–5.2)
ALT SERPL W P-5'-P-CCNC: 27 U/L (ref 12–78)
APTT BLD: 27.6 SECONDS (ref 25.9–37)
BASOPHILS # BLD AUTO: 0 10^3/UL (ref 0–0.2)
BASOPHILS NFR BLD AUTO: 0 % (ref 0–1)
BILIRUB SERPL-MCNC: 0.4 MG/DL (ref 0.2–1)
BUN SERPL-MCNC: 38 MG/DL (ref 7–18)
CALCIUM SERPL-MCNC: 8.2 MG/DL (ref 8.8–10.2)
CHLORIDE SERPL-SCNC: 105 MEQ/L (ref 98–107)
CK MB CFR.DF SERPL CALC: 1.49
CK MB SERPL-MCNC: 2.4 NG/ML (ref ?–3.6)
CK SERPL-CCNC: 161 U/L (ref 26–192)
CO2 SERPL-SCNC: 20 MEQ/L (ref 21–32)
CREAT SERPL-MCNC: 1.63 MG/DL (ref 0.55–1.3)
CRP SERPL-MCNC: 10 MG/DL (ref 0–0.3)
D DIMER PPP DDU-MCNC: 1836.02 NG/ML (ref ?–500)
EOSINOPHIL # BLD AUTO: 0 10^3/UL (ref 0–0.5)
EOSINOPHIL NFR BLD AUTO: 0 % (ref 0–3)
FERRITIN SERPL-MCNC: 621 NG/ML (ref 8–252)
FIBRINOGEN PPP-MCNC: 541 MG/DL (ref 268–480)
GFR SERPL CREATININE-BSD FRML MDRD: 32.6 ML/MIN/{1.73_M2} (ref 39–?)
GLUCOSE SERPL-MCNC: 135 MG/DL (ref 70–100)
HCT VFR BLD AUTO: 28.9 % (ref 36–47)
HGB BLD-MCNC: 9.8 G/DL (ref 12–15.5)
INR PPP: 1.01
LDH SERPL L TO P-CCNC: 380 U/L (ref 84–246)
LYMPHOCYTES # BLD AUTO: 0.7 10^3/UL (ref 1.5–5)
LYMPHOCYTES NFR BLD AUTO: 33.3 % (ref 24–44)
MAGNESIUM SERPL-MCNC: 1.4 MG/DL (ref 1.8–2.4)
MCH RBC QN AUTO: 36.7 PG (ref 27–33)
MCHC RBC AUTO-ENTMCNC: 33.9 G/DL (ref 32–36.5)
MCV RBC AUTO: 108.2 FL (ref 80–96)
MONOCYTES # BLD AUTO: 0.5 10^3/UL (ref 0–0.8)
MONOCYTES NFR BLD AUTO: 23.8 % (ref 2–8)
NEUTROPHILS # BLD AUTO: 0.8 10^3/UL (ref 1.5–8.5)
NEUTROPHILS NFR BLD AUTO: 38.6 % (ref 36–66)
PLATELET # BLD AUTO: 90 10^3/UL (ref 150–450)
POTASSIUM SERPL-SCNC: 4.6 MEQ/L (ref 3.5–5.1)
PROT SERPL-MCNC: 6.6 GM/DL (ref 6.4–8.2)
PROTHROMBIN TIME: 13.7 SECONDS (ref 12.7–14.5)
RBC # BLD AUTO: 2.67 10^6/UL (ref 4–5.4)
SODIUM SERPL-SCNC: 134 MEQ/L (ref 136–145)
TROPONIN I SERPL-MCNC: < 0.02 NG/ML (ref ?–0.1)
WBC # BLD AUTO: 2.1 10^3/UL (ref 4–10)

## 2021-10-26 PROCEDURE — 3E0333Z INTRODUCTION OF ANTI-INFLAMMATORY INTO PERIPHERAL VEIN, PERCUTANEOUS APPROACH: ICD-10-PCS | Performed by: INTERNAL MEDICINE

## 2021-10-26 PROCEDURE — XW033E5 INTRODUCTION OF REMDESIVIR ANTI-INFECTIVE INTO PERIPHERAL VEIN, PERCUTANEOUS APPROACH, NEW TECHNOLOGY GROUP 5: ICD-10-PCS | Performed by: INTERNAL MEDICINE

## 2021-10-26 RX ADMIN — RANOLAZINE SCH MG: 500 TABLET, FILM COATED, EXTENDED RELEASE ORAL at 20:34

## 2021-10-26 RX ADMIN — PROBIOTIC PRODUCT - TAB SCH EA: TAB at 21:00

## 2021-10-26 RX ADMIN — ESCITALOPRAM OXALATE SCH MG: 10 TABLET, FILM COATED ORAL at 20:13

## 2021-10-26 RX ADMIN — FAMOTIDINE SCH MG: 20 TABLET, FILM COATED ORAL at 20:15

## 2021-10-26 RX ADMIN — IPRATROPIUM BROMIDE AND ALBUTEROL SCH PUFF: 20; 100 SPRAY, METERED RESPIRATORY (INHALATION) at 11:53

## 2021-10-26 RX ADMIN — DEXAMETHASONE SODIUM PHOSPHATE SCH MG: 4 INJECTION, SOLUTION INTRAMUSCULAR; INTRAVENOUS at 19:02

## 2021-10-26 RX ADMIN — MAGNESIUM SULFATE IN DEXTROSE SCH MLS/HR: 10 INJECTION, SOLUTION INTRAVENOUS at 20:17

## 2021-10-26 RX ADMIN — METOPROLOL SUCCINATE SCH MG: 25 TABLET, EXTENDED RELEASE ORAL at 20:14

## 2021-10-26 RX ADMIN — MAGNESIUM SULFATE IN DEXTROSE SCH MLS/HR: 10 INJECTION, SOLUTION INTRAVENOUS at 19:02

## 2021-10-26 RX ADMIN — IPRATROPIUM BROMIDE AND ALBUTEROL SCH PUFF: 20; 100 SPRAY, METERED RESPIRATORY (INHALATION) at 12:09

## 2021-10-26 RX ADMIN — ASPIRIN SCH MG: 325 TABLET, COATED ORAL at 20:15

## 2021-10-26 RX ADMIN — PANTOPRAZOLE SODIUM SCH MG: 40 TABLET, DELAYED RELEASE ORAL at 19:00

## 2021-10-26 RX ADMIN — MECLIZINE HYDROCHLORIDE SCH MG: 25 TABLET ORAL at 20:12

## 2021-10-26 RX ADMIN — IPRATROPIUM BROMIDE AND ALBUTEROL SCH PUFF: 20; 100 SPRAY, METERED RESPIRATORY (INHALATION) at 12:17

## 2021-10-26 RX ADMIN — VALSARTAN SCH MG: 80 TABLET ORAL at 20:15

## 2021-10-26 RX ADMIN — PROBIOTIC PRODUCT - TAB SCH EA: TAB at 20:15

## 2021-10-26 RX ADMIN — CLOPIDOGREL BISULFATE SCH MG: 75 TABLET ORAL at 19:01

## 2021-10-26 NOTE — CCD
Continuity of Care Document (CCD)

                             Created on: 2021



Eryn Jacobo

External Reference #: [id number]

: 1945

Sex: Female



Demographics





                          Address                   [address]

[place] [id number]

 

                          Home Phone                [phone]

 

                          Preferred Language        Unknown

 

                          Marital Status            Unknown

 

                          Amish Affiliation     Unknown

 

                          Race                      White

 

                          Ethnic Group              Not  or 





Author





                          Author                    Eryn BALLARD MD

 

                          Organization              Unknown

 

                          Address                   [address] [place] [address], [address]

[place]  [address]



 

                          Phone                     [phone]







Care Team Providers





                    Care Team Member Name Role                Phone

 

                    Sumaya Martinez PA-C AUTM                [phone]

 

                    José Luis Short MD  AUTM                [phone]

 

                    Tera Morgan MD AUTM                [phone]

 

                    Halle Fallon RN ANP  AUTM                [phone]

 

                    Abhay Mario MD AUTM                [phone]







Problems





                    Active Problems     Provider            Date

 

                    History of coronary artery bypass grafting Dennis Ballard MD Onset: 

06/15/2021

 

                          Patient post percutaneous transluminal coronary angiop

lasty Dennis Ballard MD

                                        Onset: 06/15/2021

 

                    Essential hypertension Dennis Ballard MD Onset: 06/15/202

1

 

                    Aortic valve disorder Dennis Ballard MD Onset: 06/15/2021

 

                    Chest pain          Dennis Ballard MD Onset: 06/15/2021

 

                    Mixed hyperlipidemia Dennis Ballard MD Onset: 06/15/2021

 

                    Dietary management surveillance Dennis Ballard MD Onset: 

06/15/2021

 

                          Coronary arteriosclerosis after percutaneous coronary 

angioplasty Dennis Ballard MD                             Onset: 06/15/2021

 

                    Overweight          Dennis Ballard MD Onset: 06/15/2021

 

                    Electrocardiogram abnormal Dennis Ballard MD Onset: 06/15

/2021

 

                          Right bundle branch block AND left anterior fascicular

 block Dennis Ballard MD                                      Onset: 06/15/2021

 

                    Old myocardial infarction Dennis Ballard MD Onset: 06/15/

2021

 

                    Palpitations        Dennis Ballard MD Onset: 06/15/2021

 

                    Syncope and collapse Dennis Ballard MD Onset: 06/15/2021

 

                    Dizziness and giddiness Dennis Ballard MD Onset: 06/15/20

21







Social History





                Type            Date            Description     Comments

 

                Birth Sex                       Unknown          

 

                ETOH Use                        Rarely consumes alcohol  

 

                Tobacco Use     Start: Unknown End: Unknown Patient is a former 

smoker started at 

age 9, at most 1 ppd, quit in  

 

                Smoking Status  Reviewed: 07/15/21 Patient is a former smoker st

arted at age 9, 

at most 1 ppd, quit in  

 

                Exercise Type/Frequency                 Does not exercise curren

tly  

 

                Exercise Limitations                 Back Pain        

 

                Exercise Limitations                 Joint Pain      bilateral a

marjorie and legs 

 

                Exercise Limitations                 Claudication     







Allergies, Adverse Reactions, Alerts





             Active Allergies Criticality  Reaction | Severity Comments     Date

 

             Amoxicillin  Unable to assess criticality                          

 06/15/2021

 

             Atorvastatin Unable to assess criticality                          

 06/15/2021

 

             Tetracycline Unable to assess criticality                          

 06/15/2021

 

             Buspirone    Unable to assess criticality                          

 06/15/2021

 

             Adhesives    Unable to assess criticality                          

 06/15/2021

 

             Butalbital   Unable to assess criticality Hives                    

 2021

 

             Alinia       Unable to assess criticality                          

 06/15/2021

 

             Clindamycin/Lincomycin Unable to assess criticality Diarrhea       

           2021

 

             Carvedilol   Unable to assess criticality              Nausea      

 07/15/2021

 

             Nitazoxanide Unable to assess criticality Hives                    

 2021

 

             Repatha      Unable to assess criticality              Muscle Cramp

s 07/15/2021

 

             Statins      Unable to assess criticality                          

 2021

 

             Sulfamethoxazole / Trimethoprim Unable to assess criticality Rash |

 Mild               

2021







Medications





           Active Medications SIG        Qnty       Indications Ordering Provide

r Date

 

                          Ranolazine ER                     500mg Tablets ER 12H

R                   2 by 

mouth twice a day                                 Unknown         2021

 

                          Praluent                     150mg/ml Solution Auto-In

ject                   

inject 1 milliliters subcutaneous every 2 weeks 3ml             E78.2           

Dennis Ballard MD 

2021

 

                          Promethazine HCL                     25mg Tablets     

              1 by mouth 

every 6 hours as needed                                 Unknown         20

21

 

                          Zinc Chelated                     50mg Tablets        

           1 by mouth once

daily                                           Unknown         2021

 

                    Probiotic                      Capsules                   1 

by mouth every day  

                                        Unknown             2021

 

             Magnesium                     400mg Tablets                   165 m

g daily                           

Unknown                                 2021

 

                          Metoprolol Tartrate                     25mg Tablets  

                 Half 

tablet by mouth twice a day                 I10             Unknown         

 

                          Nitroglycerin                     0.6mg/HR Patches 24H

R                   1 

patch applied to skin every day (keep on 14 hours daily then remove) 30units    

               

I25.10                    Dennis Ballard MD      06/15/2021

 

                          Tramadol HCL                     50mg Tablets         

          2 by mouth every

8 hours as needed, as directed                                 Unknown         0

2021

 

                          Allopurinol                     300mg Tablets         

          1 by mouth every

day                                             Unknown         2021

 

                                        Vitamin D (Ergocalciferol)              

       1.25mg (59764 Ut) Capsules       

             1 by mouth every weekly                           Unknown      

 

                                        Vitamin Deficiency Injectable System-B12

                     1000mcg/ML Kit     

             inject 1 kit once monthly                           Unknown      

 

                          Meclizine HCL                     25mg Chewtabs       

            1 by mouth 

four times daily as needed                                 Unknown         

 

                          Pantoprazole Sodium                     40mg Tablets D

R                   2 by 

mouth every day                                 Unknown         2021

 

                          Tylenol Extra Strength                     500mg Table

ts                   2 by 

mouth twice daily as needed                                 Unknown         

 

                    Plavix                     75mg Tablets                   1 

by mouth every day  

                                        Unknown             2021

 

                          Aspirin                     325mg Tablets DR          

         1 by mouth every 

day                                             Unknown         2021

 

                          Nitroglycerin                     0.4mg Tablets Sub   

                1 tab sl 

every 5 min times 3 doses as needed chest discomfort                            

     Unknown         2021

 

                          Escitalopram Oxalate                     10mg Tablets 

                  1 by 

mouth every day                                 Unknown         2021

 

                          Valsartan                     80mg Tablets            

       2 by mouth daily at

bedtime                                         Unknown         2021

 

                                        Albuterol Sulfate HFA                   

  108(90Base) mcg/Act Aerosol           

             inhale two puffs as needed every 4 hours                           

Unknown      2021

 

                                        History Medications

 

                          Amlodipine Besylate                     2.5mg Tablets 

                  1 by 

mouth twice a day (hold dose for systolic BP <135) 180tabs                      

           Dennis Ballard MD                                      07/15/2021 - 2021

 

                                        Repatha Pushtronex System               

      420mg/3.5ML Solution Cartridge    

              420 mg (3.5 ml) sq qmo 10.5ml       E78.2        Dennis Ballard MD 

06/15/2021 - 2021

 

                          Carvedilol                     3.125mg Tablets        

           1 by mouth 

twice a day     60tabs          I10             Dennis Ballard MD 06/15/2021 

- 2021

 

                                        I25.2

 

                                        R07.9

 

                          Amlodipine Besylate                     2.5mg Tablets 

                  1 by 

mouth every day as needed for elevated BP                                 Unknow

n         2021 - 07/15/2021

 

                          Febuxostat                     40mg Tablets           

        1 by mouth every 

daily                                           Unknown         2021 - 

 

                          Metoclopramide HCL                     10mg Tablets   

                1 by mouth

four times daily                                 Unknown         2021 - 

 

                          Metoprolol Tartrate                     25mg Tablets  

                 1 by 

mouth twice a day                 I10             Unknown         2021 - 0

6/15/2021

 

                                        I25.2

 

                                        R07.9

 

                          Ranexa                     500mg Tablets ER 12HR      

             2 by mouth 

twice a day                                     Unknown         2021 - 2021

 

                          Covid-19 vaccine, Unspecified                      Inj

ection                    

                                                Unknown         







Immunizations





                                        Description

 

                                        No Information Available







Vital Signs





                Date            Vital           Result          Comment

 

                07/15/2021 10:44am Weight          155.00 lb        

 

                    Home Weight         153lb               home weight

 

                    Height              61 inches           5'1"

 

                    BMI (Body Mass Index) 29.3 kg/m2           

 

                    Heart Rate          55 /min              

 

                    BP Systolic Sitting 148 mmHg            CBP, adult cuff/Ra

 

                    BP Diastolic Sitting 63 mmHg             CBP, adult cuff/Ra

 

                06/15/2021 12:15pm Weight          157.00 lb        

 

                    Home Weight         157lb               home weight

 

                    Height              61 inches           5'1"

 

                    BMI (Body Mass Index) 29.7 kg/m2           

 

                    Heart Rate          56 /min              

 

                    BP Systolic Sitting 152 mmHg            CBP, adult cuff/Ra

 

                    BP Diastolic Sitting 63 mmHg             CBP, adult cuff/Ra







Results





        Test    Acquired Date Facility Test    Result  H/L     Range   Note

 

                    Lipid Panel         2021          [facility]

nter

           [phone] Triglycerides Level 316 mg/dL  High       <150        

 

             Cholesterol Level 191 mg/dL    Normal       <200          

 

             HDL Cholesterol 43 mg/dL     Normal       >40           

 

             LDL Cholesterol 85 mg/dL     Normal       <100          

 

             Non-HDL-C    148 mg/dL    Normal                     

 

             Cholesterol Risk Ratio 4.441        Normal       <5            

 

                    Laboratory test finding 2021          [facility]

           [phone] Vitamin B12 Level 689 pg/mL  Normal     247-911    1







                          1                         VITAMIN B12 NORMAL RANGE



NORMAL                     247 - 911 PG/ML

INDETERMINATE              211 - 246 PG/ML

DEFICIENT              LESS THAN 211 PG/ML









Procedures





                Date            Code            Description     Status

 

                09/10/2021      05136           External ECG Rec>48HR<7D Review 

& Interpretation Completed

 

                09/10/2021      71380           External ECG Rec>48HR<7D Recordi

ng Completed

 

                2021      02302           Echocardiogram 2-D Doppler Color

 Completed

 

                2021      12134           Complex Chronic Care MGMT Servic

e Ea Addl 30 Min Completed

 

                2021      30076           Complex Chronic Care Management 

SVC 1St 60 Min Completed

 

                08/10/2021      85231           TM Interpretation & Report Only 

Completed

 

                08/10/2021      46871           Myocardial Imaging (PET) Multipl

e Studies Completed

 

                07/15/2021      76548           Office/Outpatient Established Lo

w MDM 20-29 Min Completed

 

                    07/15/2021          98279               Arterial Pressure Wa

veform Analysis For Assessment Of Central 

Art                                     Completed

 

                06/15/2021      42468           Office/Outpatient New Moderate M

DM 45-59 Minutes Completed

 

                    06/15/2021          46754               Arterial Pressure Wa

veform Analysis For Assessment Of Central 

Art                                     Completed

 

                06/15/2021      51952           ECG 12-Lead     Completed







Medical Devices





                                        Description

 

                                        No Information Available







Encounters





           Type       Date       Location   Provider   Dx         Diagnosis

 

           Office Visit 2021  8:27a Main Office Dennis Ballard MD I10  

      Essential 

(primary) hypertension

 

                          I25.10                    Athscl heart disease of noemi

ve coronary artery w/o ang pctrs

 

                          E78.2                     Mixed hyperlipidemia

 

           Office Visit 07/15/2021 11:00a Main Office Dennis Ballard MD I25.1

0     Athscl 

heart disease of native coronary artery w/o ang pctrs

 

                          I10                       Essential (primary) hyperten

ayse

 

           Office Visit 06/15/2021 12:00p Main Office Dennis Ballard MD I25.1

0     Athscl 

heart disease of native coronary artery w/o ang pctrs

 

                          I25.2                     Old myocardial infarction

 

                          Z95.1                     Presence of aortocoronary by

pass graft

 

                          Z95.5                     Presence of coronary angiopl

asty implant and graft

 

                          R07.9                     Chest pain, unspecified

 

                          I10                       Essential (primary) hyperten

ayse

 

                          I35.0                     Nonrheumatic aortic (valve) 

stenosis

 

                          R94.31                    Abnormal electrocardiogram [

ECG] [EKG]

 

                          R55                       Syncope and collapse

 

                          I45.2                     Bifascicular block

 

                          R00.2                     Palpitations

 

                          R42                       Dizziness and giddiness

 

                          E78.2                     Mixed hyperlipidemia

 

                          E66.3                     Overweight

 

                          Z71.3                     Dietary counseling and surve

illance







Assessments





                Date            Code            Description     Provider

 

                09/10/2021      R42             Dizziness and giddiness Holter/E

vent/Telemetry

 

                09/10/2021      R00.2           Palpitations    Holter/Event/Tel

emetry

 

                2021      I35.0           Nonrheumatic aortic (valve) sten

osis ECHO

 

                2021      I10             Essential (primary) hypertension

 Dennis Ballard MD

 

                    2021          I25.10              Atherosclerotic hear

t disease of native coronary artery 

without angina pectoris                 Dennis Ballard MD

 

                2021      E78.2           Mixed hyperlipidemia Dennis soria MD

 

                    08/10/2021          I25.10              Atherosclerotic hear

t disease of native coronary artery 

without angina pectoris                 Cardiac PET

 

                    07/15/2021          I25.10              Atherosclerotic hear

t disease of native coronary artery 

without angina pectoris                 Dennis aBllard MD

 

                07/15/2021      I10             Essential (primary) hypertension

 Dennis Ballard MD

 

                    06/15/2021          I25.10              Atherosclerotic hear

t disease of native coronary artery 

without angina pectoris                 Dennis Ballard MD

 

                06/15/2021      I25.2           Old myocardial infarction Dennis Ballard MD

 

                06/15/2021      Z95.1           Presence of aortocoronary bypass

 graft Dennis Ballard MD

 

                06/15/2021      Z95.5           Presence of coronary angioplasty

 implant and graft Dennis Ballard MD

 

                06/15/2021      R07.9           Chest pain, unspecified Dennis Ballard MD

 

                06/15/2021      I10             Essential (primary) hypertension

 Dennis Ballard MD

 

                06/15/2021      I35.0           Nonrheumatic aortic (valve) sten

osis Dennis Ballard MD

 

                06/15/2021      R94.31          Abnormal electrocardiogram [ECG]

 [EKG] Dennis Ballard MD

 

                06/15/2021      R55             Syncope and collapse Dennis soria MD

 

                06/15/2021      I45.2           Bifascicular block Dennis henderson MD

 

                06/15/2021      R00.2           Palpitations    Dennis Ballard MD

 

                06/15/2021      R42             Dizziness and giddiness Dennis Ballard MD

 

                06/15/2021      E78.2           Mixed hyperlipidemia Dennis soria MD

 

                06/15/2021      E66.3           Overweight      Dennis Ballard MD

 

                06/15/2021      Z71.3           Dietary counseling and surveilla

nce Dennis Ballard MD







Plan of Treatment

07/15/2021 - Dennis Ballard MD* I25.10 Atherosclerotic heart disease of 
  native coronary artery without angina pectoris* Recommendations:* Now that my 
  office has access to cardiac PET myocardial perfusion imaging which is a 
  superior diagnostic modality in comparison to stress SPECT myocardial perfu
  ayse imaging, I have canceled the stress SPECT myocardial perfusion imaging 
  study that I ordered previously and ordered cardiac PET myocardial perfusion 
  imaging instead (to define her coronary prognosis). Metoprolol tartrate was 
  decreased from 25 mg twice a day down to 12.5 mg twice a day with the hope of 
  providing a faster heart rate which in turn should improve the supply/demand 
  ratio as evidenced in the observed central BP waveform analysis today. 
  Continue nitroglycerin patch, valsartan, amlodipine, nitroglycerin sublingual,
   aspirin, clopidogrel, Ranexa.





* I10 Essential (primary) hypertension* Recommendations:* Metoprolol tartrate 
  was decreased to 12.5 mg twice a day. Continue valsartan and nitroglycerin 
  patch at the current dosages. Amlodipine 2.5 mg twice a day when necessary for
   systolic BP >160 as the patient is currently taking it was changed to 
  amlodipine 2.5 mg twice a day with instruction to hold dose for systolic BP <
  135 mmHg.





* All * New Medication:* Amlodipine Besylate 2.5 mg - 1 by mouth twice a day 
  (hold dose for systolic BP <135)



* Follow up:* Clinic visit in 8 weeks with Dr. Ballard.









Functional Status





                Functional Condition Comment         Date            Status

 

                Independent with all ADL's                                 Activ

e







Mental Status





                                        Description

 

                                        No Information Available







Referrals





                                        Description

 

                                        No Information Available

## 2021-10-26 NOTE — CCD
Continuity of Care Document (CCD)

                             Created on: 10/01/2021



Eryn Jacobo

External Reference #: [id number]

: 1945

Sex: Female



Demographics





                          Address                   [address]

[place]  [address]

 

                          Home Phone                [phone]

 

                          Preferred Language        Unknown

 

                          Marital Status            Unknown

 

                          Yarsani Affiliation     Unknown

 

                          Race                      White

 

                          Ethnic Group              Not  or 





Author





                          Author                    Eryn BALLARD MD

 

                          Organization              Unknown

 

                          Address                   [address] [place] [address], [address]

[place]  [address]



 

                          Phone                     [phone]







Care Team Providers





                    Care Team Member Name Role                Phone

 

                    Sumaya Martinez PA-C [facility]                [phone]

 

                    José Luis Short MD  AUT[facility]                [phone]

 

                    Tera Morgan MD [facility]                [phone]

 

                    Halle Fallon RN ANP  [facility]                [phone]







Problems





                    Active Problems     Provider            Date

 

                    History of coronary artery bypass grafting Dennis Ballard MD Onset: 

06/15/2021

 

                          Patient post percutaneous transluminal coronary angiop

lasty Dennis Ballard MD

                                        Onset: 06/15/2021

 

                    Essential hypertension Dennis Ballard MD Onset: 06/15/202

1

 

                    Aortic valve disorder Dennis Ballard MD Onset: 06/15/2021

 

                    Chest pain          Dennis Ballard MD Onset: 06/15/2021

 

                    Mixed hyperlipidemia Dennis Ballard MD Onset: 06/15/2021

 

                    Dietary management surveillance Dennis Ballard MD Onset: 

06/15/2021

 

                          Coronary arteriosclerosis after percutaneous coronary 

angioplasty Dennis Ballard MD                             Onset: 06/15/2021

 

                    Overweight          Dennis Ballard MD Onset: 06/15/2021

 

                    Electrocardiogram abnormal Dennis Ballard MD Onset: 06/15

/2021

 

                          Right bundle branch block AND left anterior fascicular

 block Dennis Ballard MD                                      Onset: 06/15/2021

 

                    Old myocardial infarction Dennis Ballard MD Onset: 06/15/

2021

 

                    Palpitations        Dennis Ballard MD Onset: 06/15/2021

 

                    Syncope and collapse Dennis Ballard MD Onset: 06/15/2021

 

                    Dizziness and giddiness Dennis Ballard MD Onset: 06/15/20

21







Social History





                Type            Date            Description     Comments

 

                Birth Sex                       Unknown          

 

                ETOH Use                        Rarely consumes alcohol  

 

                Tobacco Use     Start: Unknown End: Unknown Patient is a former 

smoker started at 

age 9, at most 1 ppd, quit in  

 

                Smoking Status  Reviewed: 21 Patient is a former smoker st

[place] at age 9, 

at most 1 ppd, quit in  

 

                Exercise Type/Frequency                 Does not exercise curren

tly  

 

                Exercise Limitations                 Back Pain        

 

                Exercise Limitations                 Joint Pain      bilateral a

marjorie and legs 

 

                Exercise Limitations                 Claudication     







Allergies, Adverse Reactions, Alerts





             Active Allergies Criticality  Reaction | Severity Comments     Date

 

             Amoxicillin  Unable to assess criticality                          

 06/15/2021

 

             Atorvastatin Unable to assess criticality                          

 06/15/2021

 

             Tetracycline Unable to assess criticality                          

 06/15/2021

 

             Buspirone    Unable to assess criticality                          

 06/15/2021

 

             Adhesives    Unable to assess criticality                          

 06/15/2021

 

             Butalbital   Unable to assess criticality Hives                    

 2021

 

             Alinia       Unable to assess criticality                          

 06/15/2021

 

             Clindamycin/Lincomycin Unable to assess criticality Diarrhea       

           2021

 

             Carvedilol   Unable to assess criticality              Nausea      

 07/15/2021

 

             Nitazoxanide Unable to assess criticality Hives                    

 2021

 

             Repatha      Unable to assess criticality              Muscle Cramp

s 07/15/2021

 

             Statins      Unable to assess criticality                          

 2021

 

             Sulfamethoxazole / Trimethoprim Unable to assess criticality Rash |

 Mild               

2021







Medications





           Active Medications SIG        Qnty       Indications Ordering Provide

r Date

 

                          Famotidine                     40mg Tablets           

        1 by mouth daily 

at bedtime      30tabs          K21.9           Dennis Ballard MD 2021

 

                    Bystolic                     5mg Tablets                   1

 by mouth every day 

30tabs              I10                 Dennis Ballard MD 2021

 

                          Vascepa                     1gm Capsules              

     2 caps by mouth twice

a day (take with meals) 360caps         E78.2           Dennis Ballard MD 

 

                    Biotin                     2500mcg Capsules                 

  1 by mouth daily     

                                        Unknown             2021

 

                          Valsartan                     80mg Tablets            

       2 by mouth every 

day at bedtime and occasionally 2 by mouth in morning if needed (pt adjusted 
dose)                                           Unknown         2021

 

                          Amlodipine Besylate                     2.5mg Tablets 

                  1 by 

mouth daily only if SBP is <150                                 Selvin Morgan MD 2021

 

                          Ranolazine ER                     500mg Tablets ER 12H

R                   2 by 

mouth twice a day                                 Unknown         2021

 

                          Praluent                     150mg/ml Solution Auto-In

ject                   

inject 1 milliliters subcutaneous every 2 weeks 3ml             E78.2           

Dennis Ballard MD 

2021

 

                    Probiotic                      Capsules                   1 

by mouth every day  

                                        Unknown             2021

 

             Magnesium                     400mg Tablets                   165 m

g daily                           

Unknown                                 2021

 

                          Zinc Chelated                     50mg Tablets        

           1 by mouth once

daily                                           Unknown         2021

 

                          Promethazine HCL                     25mg Tablets     

              1 by mouth 

every 6 hours as needed                                 Unknown         20

21

 

                          Nitroglycerin                     0.6mg/HR Patches 24H

R                   1 

patch applied to skin every day (keep on 14 hours daily then remove) 30units    

               

I25.10                    Dennis Ballard MD      06/15/2021

 

                          Allopurinol                     300mg Tablets         

          1 by mouth every

day                                             Unknown         2021

 

                                        Vitamin D (Ergocalciferol)              

       1.25mg (81833 Ut) Capsules       

             1 by mouth every weekly                           Unknown      

 

                                        Vitamin Deficiency Injectable System-B12

                     1000mcg/ML Kit     

             inject 1 kit once monthly                           Unknown      

 

                          Meclizine HCL                     25mg Chewtabs       

            1 by mouth 

four times daily as needed                                 Unknown         

 

                          Tylenol Extra Strength                     500mg Table

ts                   2 by 

mouth twice daily as needed                                 Unknown         

 

                          Tramadol HCL                     50mg Tablets         

          2 by mouth every

8 hours as needed, as directed                                 Unknown         0

2021

 

                          Aspirin                     325mg Tablets DR          

         1 by mouth every 

day                                             Unknown         2021

 

                          Nitroglycerin                     0.4mg Tablets Sub   

                1 tab sl 

every 5 min times 3 doses as needed chest discomfort                            

     Unknown         2021

 

                          Escitalopram Oxalate                     10mg Tablets 

                  1 by 

mouth every day                                 Unknown         2021

 

                                        Albuterol Sulfate HFA                   

  108(90Base) mcg/Act Aerosol           

             inhale two puffs as needed every 4 hours                           

Unknown      2021

 

                    Plavix                     75mg Tablets                   1 

by mouth every day  

                                        Unknown             2021

 

                                        History Medications

 

                          Metoprolol Tartrate                     25mg Tablets  

                 1 by 

mouth twice a day                 I10             Unknown         2021 - 0

2021

 

                          Amlodipine Besylate                     2.5mg Tablets 

                  1 by 

mouth twice a day (hold dose for systolic BP <135) 180tabs                      

           Dennis Ballard MD                                      07/15/2021 - 2021

 

                          Metoprolol Tartrate                     25mg Tablets  

                 Half 

tablet by mouth twice a day                 I10             Unknown          - 2021

 

                                        Repatha Pushtronex System               

      420mg/3.5ML Solution Cartridge    

              420 mg (3.5 ml) sq qmo 10.5ml       E78.2        Dennis Ballard MD 

06/15/2021 - 2021

 

                          Carvedilol                     3.125mg Tablets        

           1 by mouth 

twice a day     60tabs          I10             Dennis Ballard MD 06/15/2021 

- 2021

 

                                        I25.2

 

                                        R07.9

 

                          Valsartan                     80mg Tablets            

       2 by mouth daily at

bedtime                                         Unknown         2021 - 

 

                          Amlodipine Besylate                     2.5mg Tablets 

                  1 by 

mouth every day as needed for elevated BP                                 Unknow

n         2021 - 07/15/2021

 

                          Febuxostat                     40mg Tablets           

        1 by mouth every 

daily                                           Unknown         2021 - 

 

                          Pantoprazole Sodium                     40mg Tablets D

R                   2 by 

mouth every day                 K21.9           Unknown         2021 - 

 

                          Metoclopramide HCL                     10mg Tablets   

                1 by mouth

four times daily                                 Unknown         2021 - 

 

                          Metoprolol Tartrate                     25mg Tablets  

                 1 by 

mouth twice a day                 I10             Unknown         2021 - 0

6/15/2021

 

                                        I25.2

 

                                        R07.9

 

                          Ranexa                     500mg Tablets ER 12HR      

             2 by mouth 

twice a day                                     Unknown         2021 - 2021

 

                          Covid-19 vaccine, Unspecified                      Inj

ection                    

                                                Unknown         







Immunizations





                                        Description

 

                                        No Information Available







Vital Signs





                Date            Vital           Result          Comment

 

                2021 12:33pm Weight          158.00 lb        

 

                    Home Weight         155lb                

 

                    Height              61 inches           5'1"

 

                    BMI (Body Mass Index) 29.9 kg/m2           

 

                    Heart Rate          52 /min              

 

                    BP Systolic Sitting 170 mmHg            CBP large cuff, Ra

 

                    BP Diastolic Sitting 70 mmHg             CBP large cuff, Ra

 

                07/15/2021 10:44am Weight          155.00 lb        

 

                    Home Weight         153lb               home weight

 

                    Height              61 inches           5'1"

 

                    BMI (Body Mass Index) 29.3 kg/m2           

 

                    Heart Rate          55 /min              

 

                    BP Systolic Sitting 148 mmHg            CBP, adult cuff/Ra

 

                    BP Diastolic Sitting 63 mmHg             CBP, adult cuff/Ra







Results





        Test    Acquired Date Facility Test    Result  H/L     Range   Note

 

                    Lipid Panel         2021          [facility]

nter

           [phone] Triglycerides Level 316 mg/dL  High       <150        

 

             Cholesterol Level 191 mg/dL    Normal       <200          

 

             HDL Cholesterol 43 mg/dL     Normal       >40           

 

             LDL Cholesterol 85 mg/dL     Normal       <100          

 

             Non-HDL-C    148 mg/dL    Normal                     

 

             Cholesterol Risk Ratio 4.441        Normal       <5            

 

                    Laboratory test finding 2021          [facility]

           [phone] Vitamin B12 Level 689 pg/mL  Normal     247-911    1







                          1                         VITAMIN B12 NORMAL RANGE



NORMAL                     247 - 911 PG/ML

INDETERMINATE              211 - 246 PG/ML

DEFICIENT              LESS THAN 211 PG/ML









Procedures





                Date            Code            Description     Status

 

                2021      53043           Office/Outpatient Established Lo

w MDM 20-29 Min Completed

 

                09/10/2021      63387           External ECG Rec>48HR<7D Review 

& Interpretation Completed

 

                09/10/2021      95050           External ECG Rec>48HR<7D Recordi

ng Completed

 

                2021      62625           Echocardiogram 2-D Doppler Color

 Completed

 

                2021      36900           Complex Chronic Care MGMT Servic

e Ea Addl 30 Min Completed

 

                2021      13014           Complex Chronic Care Management 

SVC 1St 60 Min Completed

 

                08/10/2021      52975           TM Interpretation & Report Only 

Completed

 

                08/10/2021      39226           Myocardial Imaging (PET) Multipl

e Studies Completed

 

                07/15/2021      36381           Office/Outpatient Established Lo

w MDM 20-29 Min Completed

 

                    07/15/2021          72451               Arterial Pressure Wa

veform Analysis For Assessment Of Central 

Art                                     Completed

 

                06/15/2021      04466           Office/Outpatient New Moderate M

DM 45-59 Minutes Completed

 

                    06/15/2021          43124               Arterial Pressure Wa

veform Analysis For Assessment Of Central 

Art                                     Completed

 

                06/15/2021      98087           ECG 12-Lead     Completed







Medical Devices





                                        Description

 

                                        No Information Available







Encounters





           Type       Date       Location   Provider   Dx         Diagnosis

 

           Office Visit 2021 12:15p Main Office Dennis Ballard MD I25.1

0     [facility] 

heart disease of native coronary artery w/o ang pctrs

 

                          I10                       Essential (primary) hyperten

ayse

 

                          E78.2                     Mixed hyperlipidemia

 

           Office Visit 2021  8:27a Main Office Dennis Ballard MD I10  

      Essential 

(primary) hypertension

 

                          I25.10                    Athscl heart disease of noemi

ve coronary artery w/o ang pctrs

 

                          E78.2                     Mixed hyperlipidemia

 

           Office Visit 07/15/2021 11:00a Main Office Dennis Ballard MD I25.1

0     Athscl 

heart disease of native coronary artery w/o ang pctrs

 

                          I10                       Essential (primary) hyperten

ayse

 

           Office Visit 06/15/2021 12:00p Main Office Dennis Ballard MD I25.1

0     Athscl 

heart disease of native coronary artery w/o ang pctrs

 

                          I25.2                     Old myocardial infarction

 

                          Z95.1                     Presence of aortocoronary by

pass graft

 

                          Z95.5                     Presence of coronary angiopl

asty implant and graft

 

                          R07.9                     Chest pain, unspecified

 

                          I10                       Essential (primary) hyperten

ayse

 

                          I35.0                     Nonrheumatic aortic (valve) 

stenosis

 

                          R94.31                    Abnormal electrocardiogram [

ECG] [EKG]

 

                          R55                       Syncope and collapse

 

                          I45.2                     Bifascicular block

 

                          R00.2                     Palpitations

 

                          R42                       Dizziness and giddiness

 

                          E78.2                     Mixed hyperlipidemia

 

                          E66.3                     Overweight

 

                          Z71.3                     Dietary counseling and surve

illance







Assessments





                Date            Code            Description     Provider

 

                    2021          I25.10              Atherosclerotic hear

t disease of native coronary artery 

without angina pectoris                 Dennis Ballard MD

 

                2021      I10             Essential (primary) hypertension

 Dennis Ballard MD

 

                2021      E78.2           Mixed hyperlipidemia Dennis soria MD

 

                09/10/2021      R42             Dizziness and giddiness Holter/E

vent/Telemetry

 

                09/10/2021      R00.2           Palpitations    Holter/Event/Tel

emetry

 

                2021      I35.0           Nonrheumatic aortic (valve) sten

osis ECHO

 

                2021      I10             Essential (primary) hypertension

 Dennis Ballard MD

 

                    2021          I25.10              Atherosclerotic hear

t disease of native coronary artery 

without angina pectoris                 Dennis Ballard MD

 

                2021      E78.2           Mixed hyperlipidemia Dennis soria MD

 

                    08/10/2021          I25.10              Atherosclerotic hear

t disease of native coronary artery 

without angina pectoris                 Cardiac PET

 

                    07/15/2021          I25.10              Atherosclerotic hear

t disease of native coronary artery 

without angina pectoris                 Dennis Ballard MD

 

                07/15/2021      I10             Essential (primary) hypertension

 Dennis Ballard MD

 

                    06/15/2021          I25.10              Atherosclerotic hear

t disease of native coronary artery 

without angina pectoris                 Dennis Ballard MD

 

                06/15/2021      I25.2           Old myocardial infarction Dennis Ballard MD

 

                06/15/2021      Z95.1           Presence of aortocoronary bypass

 graft Dennis Ballard MD

 

                06/15/2021      Z95.5           Presence of coronary angioplasty

 implant and graft Dennis Ballard MD

 

                06/15/2021      R07.9           Chest pain, unspecified Dennis Ballard MD

 

                06/15/2021      I10             Essential (primary) hypertension

 Dennis Ballard MD

 

                06/15/2021      I35.0           Nonrheumatic aortic (valve) sten

osis Dennis Ballard MD

 

                06/15/2021      R94.31          Abnormal electrocardiogram [ECG]

 [EKG] Dennis Ballard MD

 

                06/15/2021      R55             Syncope and collapse Dennis soria MD

 

                06/15/2021      I45.2           Bifascicular block Dennis henderson MD

 

                06/15/2021      R00.2           Palpitations    Dennis Ballard MD

 

                06/15/2021      R42             Dizziness and giddiness Dennis Ballard MD

 

                06/15/2021      E78.2           Mixed hyperlipidemia Dennis soria MD

 

                06/15/2021      E66.3           Overweight      Dennis Ballard MD

 

                06/15/2021      Z71.3           Dietary counseling and surveilla

nce Dennis Ballard MD







Plan of Treatment

Future Appointment(s):* 2022  1:00 pm - Dennis Ballard MD at Main 
  Office

2021 - Dennis Ballard MD* I25.10 Atherosclerotic heart disease of 
  native coronary artery without angina pectoris

* I10 Essential (primary) hypertension* New Medication:* Bystolic 5 mg - 1 by 
  mouth every day





* E78.2 Mixed hyperlipidemia* New Medication:* Vascepa 1 gm - 2 caps by mouth 
  twice a day (take with meals)





* All * Follow up:* Clinic visit in 12 weeks with Dr. Ballard.









Functional Status





                Functional Condition Comment         Date            Status

 

                Independent with all ADL's                                 Activ

e







Mental Status





                                        Description

 

                                        No Information Available







Referrals





                                        Description

 

                                        No Information Available

## 2021-10-26 NOTE — CCD
Continuity of Care Document (CCD)

                             Created on: 2021



DonnellEryn TIM

External Reference #: [id number]

: 1945

Sex: Female



Demographics





                          Address                   [address]

[place]  [address]

 

                          Home Phone                [phone]

 

                          Preferred Language        Unknown

 

                          Marital Status            Unknown

 

                          Samaritan Affiliation     Unknown

 

                          Race                      White

 

                          Ethnic Group              Not  or 





Author





                          Eryn Loo

 

                          Organization              Unknown

 

                          Address                   [address] [place] [address], [address]

[place]  [address]



 

                          Phone                     [phone]







Care Team Providers





                    Care Team Member Name Role                Phone

 

                    Sumaya Martinez PA-C AUTM                [phone]

 

                    José Luis Short MD  AUTM                [phone]

 

                    Tera Morgan MD AUTM                [phone]

 

                    Halle Fallon RN ANP  AUTM                [phone]







Problems





                    Active Problems     Provider            Date

 

                    History of coronary artery bypass grafting Dennis Ballard MD Onset: 

06/15/2021

 

                          Patient post percutaneous transluminal coronary angiop

lasty Dennis Ballard MD

                                        Onset: 06/15/2021

 

                    Essential hypertension Dennis Ballard MD Onset: 06/15/202

1

 

                    Aortic valve disorder Dennis Ballard MD Onset: 06/15/2021

 

                    Chest pain          Dennis Ballard MD Onset: 06/15/2021

 

                    Mixed hyperlipidemia Dennis Ballard MD Onset: 06/15/2021

 

                    Dietary management surveillance Dennis Ballard MD Onset: 

06/15/2021

 

                          Coronary arteriosclerosis after percutaneous coronary 

angioplasty Dennis Ballard MD                             Onset: 06/15/2021

 

                    Overweight          Dennis Ballard MD Onset: 06/15/2021

 

                    Electrocardiogram abnormal Dennis Ballard MD Onset: 06/15

/2021

 

                          Right bundle branch block AND left anterior fascicular

 block Dennis Ballard MD                                      Onset: 06/15/2021

 

                    Old myocardial infarction Dennis Ballard MD Onset: 06/15/

2021

 

                    Palpitations        Dennis Ballard MD Onset: 06/15/2021

 

                    Syncope and collapse Dennis Ballard MD Onset: 06/15/2021

 

                    Dizziness and giddiness Dennis Ballard MD Onset: 06/15/20

21







Social History





                Type            Date            Description     Comments

 

                Birth Sex                       Unknown          

 

                ETOH Use                        Rarely consumes alcohol  

 

                Tobacco Use     Start: Unknown End: Unknown Patient is a former 

smoker started at 

age 9, at most 1 ppd, quit in  

 

                Smoking Status  Reviewed: 07/15/21 Patient is a former smoker st

[place] at age 9, 

at most 1 ppd, quit in  

 

                Exercise Type/Frequency                 Does not exercise curren

tly  

 

                Exercise Limitations                 Back Pain        

 

                Exercise Limitations                 Joint Pain      bilateral a

marjorie and legs 

 

                Exercise Limitations                 Claudication     







Allergies, Adverse Reactions, Alerts





             Active Allergies Criticality  Reaction | Severity Comments     Date

 

             Amoxicillin  Unable to assess criticality                          

 06/15/2021

 

             Atorvastatin Unable to assess criticality                          

 06/15/2021

 

             Tetracycline Unable to assess criticality                          

 06/15/2021

 

             Buspirone    Unable to assess criticality                          

 06/15/2021

 

             Adhesives    Unable to assess criticality                          

 06/15/2021

 

             Butalbital   Unable to assess criticality Hives                    

 2021

 

             Alinia       Unable to assess criticality                          

 06/15/2021

 

             Clindamycin/Lincomycin Unable to assess criticality Diarrhea       

           2021

 

             Carvedilol   Unable to assess criticality              Nausea      

 07/15/2021

 

             Nitazoxanide Unable to assess criticality Hives                    

 2021

 

             Repatha      Unable to assess criticality              Muscle Cramp

s 07/15/2021

 

             Statins      Unable to assess criticality                          

 2021

 

             Sulfamethoxazole / Trimethoprim Unable to assess criticality Rash |

 Mild               

2021







Medications





           Active Medications SIG        Qnty       Indications Ordering Provide

r Date

 

                          Praluent                     150mg/ml Solution Auto-In

ject                   

inject 1 milliliters subcutaneous every 2 weeks 3ml             E78.2           

Dennis Ballard MD 

2021

 

                          Promethazine HCL                     25mg Tablets     

              1 by mouth 

every 6 hours as needed                                 Unknown         20

 

                          Zinc Chelated                     50mg Tablets        

           1 by mouth once

daily                                           Unknown         2021

 

                    Probiotic                      Capsules                   1 

by mouth every day  

                                        Unknown             2021

 

             Magnesium                     400mg Tablets                   165 m

g daily                           

Unknown                                 2021

 

                          Metoprolol Tartrate                     25mg Tablets  

                 Half 

tablet by mouth twice a day                 I10             Unknown         

 

                          Nitroglycerin                     0.6mg/HR Patches 24H

R                   1 

patch applied to skin every day (keep on 14 hours daily then remove) 30units    

               

I25.10                    Dennis Ballard MD      06/15/2021

 

                          Tylenol Extra Strength                     500mg Table

ts                   2 by 

mouth twice daily as needed                                 Unknown         

 

                          Allopurinol                     300mg Tablets         

          1 by mouth every

day                                             Unknown         2021

 

                                        Vitamin D (Ergocalciferol)              

       1.25mg (55890 Ut) Capsules       

             1 by mouth every weekly                           Unknown      

 

                                        Vitamin Deficiency Injectable System-B12

                     1000mcg/ML Kit     

             inject 1 kit once monthly                           Unknown      

 

                          Ranexa                     500mg Tablets ER 12HR      

             2 by mouth 

twice a day                                     Unknown         2021

 

                          Meclizine HCL                     25mg Chewtabs       

            1 by mouth 

four times daily as needed                                 Unknown         

 

                          Pantoprazole Sodium                     40mg Tablets D

R                   2 by 

mouth every day                                 Unknown         2021

 

                          Tramadol HCL                     50mg Tablets         

          2 by mouth every

8 hours as needed, as directed                                 Unknown         0

2021

 

                    Plavix                     75mg Tablets                   1 

by mouth every day  

                                        Unknown             2021

 

                          Aspirin                     325mg Tablets DR          

         1 by mouth every 

day                                             Unknown         2021

 

                          Nitroglycerin                     0.4mg Tablets Sub   

                1 tab sl 

every 5 min times 3 doses as needed chest discomfort                            

     Unknown         2021

 

                          Escitalopram Oxalate                     10mg Tablets 

                  1 by 

mouth every day                                 Unknown         2021

 

                          Valsartan                     80mg Tablets            

       2 by mouth daily at

bedtime                                         Unknown         2021

 

                                        Albuterol Sulfate HFA                   

  108(90Base) mcg/Act Aerosol           

             inhale two puffs as needed every 4 hours                           

Unknown      2021

 

                                        History Medications

 

                          Amlodipine Besylate                     2.5mg Tablets 

                  1 by 

mouth twice a day (hold dose for systolic BP <135) 180tabs                      

           Dennis Ballard MD                                      07/15/2021 - 2021

 

                                        Repatha Pushtronex System               

      420mg/3.5ML Solution Cartridge    

              420 mg (3.5 ml) sq qmo 10.5ml       E78.2        Dennis Ballard MD 

06/15/2021 - 2021

 

                          Carvedilol                     3.125mg Tablets        

           1 by mouth 

twice a day     60tabs          I10             Dennis Ballard MD 06/15/2021 

- 2021

 

                                        I25.2

 

                                        R07.9

 

                          Amlodipine Besylate                     2.5mg Tablets 

                  1 by 

mouth every day as needed for elevated BP                                 Unknow

n         2021 - 07/15/2021

 

                          Febuxostat                     40mg Tablets           

        1 by mouth every 

daily                                           Unknown         2021 - 

 

                          Metoclopramide HCL                     10mg Tablets   

                1 by mouth

four times daily                                 Unknown         2021 - 

 

                          Metoprolol Tartrate                     25mg Tablets  

                 1 by 

mouth twice a day                 I10             Unknown         2021 - 0

6/15/2021

 

                                        I25.2

 

                                        R07.9

 

                          Covid-19 vaccine, Unspecified                      Inj

ection                    

                                                Unknown         







Immunizations





                                        Description

 

                                        No Information Available







Vital Signs





                Date            Vital           Result          Comment

 

                07/15/2021 10:44am Weight          155.00 lb        

 

                    Home Weight         153lb               home weight

 

                    Height              61 inches           5'1"

 

                    BMI (Body Mass Index) 29.3 kg/m2           

 

                    Heart Rate          55 /min              

 

                    BP Systolic Sitting 148 mmHg            CBP, adult cuff/Ra

 

                    BP Diastolic Sitting 63 mmHg             CBP, adult cuff/Ra

 

                06/15/2021 12:15pm Weight          157.00 lb        

 

                    Home Weight         157lb               home weight

 

                    Height              61 inches           5'1"

 

                    BMI (Body Mass Index) 29.7 kg/m2           

 

                    Heart Rate          56 /min              

 

                    BP Systolic Sitting 152 mmHg            CBP, adult cuff/Ra

 

                    BP Diastolic Sitting 63 mmHg             CBP, adult cuff/Ra







Results





        Test    Acquired Date Facility Test    Result  H/L     Range   Note

 

                    Lipid Panel         2021          [facility]

nter

           [phone] Triglycerides Level 316 mg/dL  High       <150        

 

             Cholesterol Level 191 mg/dL    Normal       <200          

 

             HDL Cholesterol 43 mg/dL     Normal       >40           

 

             LDL Cholesterol 85 mg/dL     Normal       <100          

 

             Non-HDL-C    148 mg/dL    Normal                     

 

             Cholesterol Risk Ratio 4.441        Normal       <5            

 

                    Laboratory test finding 2021          [facility]

           [phone] Vitamin B12 Level 689 pg/mL  Normal     247-911    1

 

                    CMP                 2021          [facility]

             Albumin Serum/Plasma 4.1                                     

 

             Alt - SGPT   31                                      

 

             Calcium Ser/Plasma Mass/Vol 9.5                                    

 

 

             Carbon Dioxide Ser/Plasm 24                                      

 

             Chloride Serum/Plasma 105                                     

 

             Alkaline Phosphatase 52                                      

 

             Potassium    4.4                                     

 

             Protein Total 7.8                                     

 

             Sodium       140                                     

 

             Ast - Sgot   18                                      

 

             BUN - Urea Nitrogen 30                                      

 

             Glucose      96                                      

 

             Creatinine For GFR 1.69                                    

 

                    CBC without Differential 2021          [facility]

e

             White Blood Count 8.1                                     

 

             Red Blood Count 3.05                                    

 

             Platelets    173                                     

 

             Hemoglobin   11.0                                    

 

             Hematocrit   32.4                                    

 

                    Laboratory test finding 2021          [facility]

             Thyroid Stimulating Hormone 2.105                                  

 







                          1                         VITAMIN B12 NORMAL RANGE



NORMAL                     247 - 911 PG/ML

INDETERMINATE              211 - 246 PG/ML

DEFICIENT              LESS THAN 211 PG/ML









Procedures





                Date            Code            Description     Status

 

                2021      43951           Echocardiogram 2-D Doppler Color

 Completed

 

                08/10/2021      07597           TM Interpretation & Report Only 

Completed

 

                08/10/2021      55810           Myocardial Imaging (PET) Multipl

e Studies Completed

 

                07/15/2021      17438           Office/Outpatient Established Lo

w MDM 20-29 Min Completed

 

                    07/15/2021          84107               Arterial Pressure Wa

veform Analysis For Assessment Of Central 

Art                                     Completed

 

                06/15/2021      31576           Office/Outpatient New Moderate M

DM 45-59 Minutes Completed

 

                    06/15/2021          42594               Arterial Pressure Wa

veform Analysis For Assessment Of Central 

Art                                     Completed

 

                06/15/2021      37656           ECG 12-Lead     Completed







Medical Devices





                                        Description

 

                                        No Information Available







Encounters





           Type       Date       Location   Provider   Dx         Diagnosis

 

           Office Visit 07/15/2021 11:00a Main Office Dennis Ballard MD I25.1

0     [facility] 

heart disease of native coronary artery w/o ang pctrs

 

                          I10                       Essential (primary) hyperten

ayse

 

           Office Visit 06/15/2021 12:00p Main Office Dennis Ballard MD I25.1

0     [facility] 

heart disease of native coronary artery w/o ang pctrs

 

                          I25.2                     Old myocardial infarction

 

                          Z95.1                     Presence of aortocoronary by

pass graft

 

                          Z95.5                     Presence of coronary angiopl

asty implant and graft

 

                          R07.9                     Chest pain, unspecified

 

                          I10                       Essential (primary) hyperten

ayse

 

                          I35.0                     Nonrheumatic aortic (valve) 

stenosis

 

                          R94.31                    Abnormal electrocardiogram [

ECG] [EKG]

 

                          R55                       Syncope and collapse

 

                          I45.2                     Bifascicular block

 

                          R00.2                     Palpitations

 

                          R42                       Dizziness and giddiness

 

                          E78.2                     Mixed hyperlipidemia

 

                          E66.3                     Overweight

 

                          Z71.3                     Dietary counseling and surve

illance







Assessments





                Date            Code            Description     Provider

 

                2021      I35.0           Nonrheumatic aortic (valve) sten

osis ECHO

 

                    08/10/2021          I25.10              Atherosclerotic hear

t disease of native coronary artery 

without angina pectoris                 Cardiac PET

 

                    07/15/2021          I25.10              Atherosclerotic hear

t disease of native coronary artery 

without angina pectoris                 Dennis Ballard MD

 

                07/15/2021      I10             Essential (primary) hypertension

 Dennis Ballard MD

 

                    06/15/2021          I25.10              Atherosclerotic hear

t disease of native coronary artery 

without angina pectoris                 Dennis Ballard MD

 

                06/15/2021      I25.2           Old myocardial infarction Dennis Ballard MD

 

                06/15/2021      Z95.1           Presence of aortocoronary bypass

 graft Dennis Ballard MD

 

                06/15/2021      Z95.5           Presence of coronary angioplasty

 implant and graft Dennis Ballard MD

 

                06/15/2021      R07.9           Chest pain, unspecified Dennis Ballard MD

 

                06/15/2021      I10             Essential (primary) hypertension

 Dennis Ballard MD

 

                06/15/2021      I35.0           Nonrheumatic aortic (valve) sten

osis Dennis Ballard MD

 

                06/15/2021      R94.31          Abnormal electrocardiogram [ECG]

 [EKG] Dennis Ballard MD

 

                06/15/2021      R55             Syncope and collapse Dennis soria MD

 

                06/15/2021      I45.2           Bifascicular block Dennis henderson MD

 

                06/15/2021      R00.2           Palpitations    Dennis Ballard MD

 

                06/15/2021      R42             Dizziness and giddiness Dennis Ballard MD

 

                06/15/2021      E78.2           Mixed hyperlipidemia Dennis soria MD

 

                06/15/2021      E66.3           Overweight      Dennis Ballard MD

 

                06/15/2021      Z71.3           Dietary counseling and surveilla

nce Dennis Ballard MD







Plan of Treatment

Future Appointment(s):* 2021 12:15 pm - Dennis Ballard MD at Main 
  Office

07/15/2021 - Dennis Ballard MD* I25.10 Atherosclerotic heart disease of 
  native coronary artery without angina pectoris* Recommendations:* Now that my 
  office has access to cardiac PET myocardial perfusion imaging which is a 
  superior diagnostic modality in comparison to stress SPECT myocardial perfu
  ayse imaging, I have canceled the stress SPECT myocardial perfusion imaging 
  study that I ordered previously and ordered cardiac PET myocardial perfusion 
  imaging instead (to define her coronary prognosis). Metoprolol tartrate was 
  decreased from 25 mg twice a day down to 12.5 mg twice a day with the hope of 
  providing a faster heart rate which in turn should improve the supply/demand 
  ratio as evidenced in the observed central BP waveform analysis today. 
  Continue nitroglycerin patch, valsartan, amlodipine, nitroglycerin sublingual,
   aspirin, clopidogrel, Ranexa.





* I10 Essential (primary) hypertension* Recommendations:* Metoprolol tartrate 
  was decreased to 12.5 mg twice a day. Continue valsartan and nitroglycerin 
  patch at the current dosages. Amlodipine 2.5 mg twice a day when necessary for
   systolic BP >160 as the patient is currently taking it was changed to 
  amlodipine 2.5 mg twice a day with instruction to hold dose for systolic BP <
  135 mmHg.





* All * New Medication:* Amlodipine Besylate 2.5 mg - 1 by mouth twice a day 
  (hold dose for systolic BP <135)



* Follow up:* Clinic visit in 8 weeks with Dr. Ballard.









Functional Status





                Functional Condition Comment         Date            Status

 

                Independent with all ADL's                                 Activ

e







Mental Status





                                        Description

 

                                        No Information Available







Referrals





                                        Description

 

                                        No Information Available none

## 2021-10-26 NOTE — HPEPDOC
[facility] Medical History & Physical


Date of Admission


Oct 26, 2021


Date of Service:  Oct 26, 2021


Attending Physician:  SARAH BUSTAMANTE MD





History and Physical


CHIEF COMPLAINT: Progressive weakness, poor PO, shortness of breath with 

exertion





HISTORY OF PRESENT ILLNESS: 


74 yo W with a history of CAD s/p CABG x 3, and thereafter PCI with coronary 

stents, PVD with LE stents, carotid artery disease, angina, HLD, HTN, CHF?, 

history of recurrent Cdiff s/p fecal transplant in 2018, CKD with solitary R 

kidney after left was removed due to malignancy, history of papillary urothelial

CA, HTN who lives at home with her  and they have been ill for 

approximately 2 weeks after he got ill first but was declining coming to the 

hospital and she has progressively been getting worse with poor PO, worsening 

weakness, fever, chills, some diarrhea, and now exertional shortness of breath 

that she decided to come to the ED. Of note, the  is also now admitted.





In the ED, she was febrile to 101.7, hypertensive to 172/72 and noted to be 

hypoxemic to 87% on room air on arrival and placed on 2L NC. Workup was notable 

for respiratory panel that was positive for covid-19 infection, pancytopenia 

that is new with WBC 2.1, hgb 9.8 (close to baseline anemia), thrombocytopenia 

to 90, Cr 1.63 at baseline, Na 134, K 4.6, mag 1.4, procalcitonin 0.69, CRP 10, 

lactic acid 2.3 while troponin was negative, EKG was non-ischemic and CXR showed

no new infiltrates or effusions. She is now being admitted for covid-19 PNA with

related pancytopenia and illness.





PAST MEDICAL HISTORY:


CAD s/p CABG x 3, and thereafter PCI with coronary stents


PVD with LE stents


carotid artery disease


angina


HLD


HTN


CHF?


history of recurrent Cdiff s/p fecal transplant in 2018


 CKD with solitary R kidney after left was removed due to malignancy


history of papillary urothelial CA


Depression


Anxiety


GERD


JACQUI on CPAP


Migraines 





PAST SURGICAL HISTORY:


Colonoscopies


L nephrectomy


CABG x 3


Stents both coronary and lower extremities


Back surgeries


Colon resection


Fecal transplant








SOCIAL HISTORY:


Former smoker


No alcohol


No illicit drug use





FAMILY HISTORY:


Father:  from alcoholic liver cirrhosis complications


Mother: Cancer


Siblings: Twin brother has had a CABG as well


Children: None





ALLERGIES: Please see below.





REVIEW OF SYSTEMS: 10 point ROS was completed and was otherwise negative except 

for the pertinent positives noted in the HPI above





HOME MEDICATIONS: Please see below. 





PHYSICAL EXAMINATION:


VITAL SIGNS: see below


GENERAL APPEARANCE: NAD, ill appearing


HEENT: NCAT, EOMI, MMM


CARDIOVASCULAR: RRR, no m/r/g


LUNGS: CTAB, tachypneic, on 2L NC, no crackles, no wheezing or rhonchi at this 

time


ABDOMEN: Normoactive bowel sounds, soft, NTND


EXTREMITIES: WWP, no LE edema


NEUROLOGICAL: CM3-12 intact, nonfocal examination


PSYCHIATRIC: AOx3





LABORATORY DATA: See below.





IMAGING:





CXR:





Mild scattered interstitial fibrotic changes noted.  There is no definite 

superimposed


acute infiltrate.  The heart is not significantly enlarged.  The mediastinal


silhouette is unchanged with some calcific plaque in the thoracic aorta.  

Multiple


sternal wires and mediastinal clips are present.





IMPRESSION:


Stable chronic findings.  No evidence of acute infiltrate.








MICROBIOLOGY: Please see below. 





ASSESSMENT: 


74 yo W with a history of CAD s/p CABG x 3, and thereafter PCI with coronary 

stents, PVD with LE stents, carotid artery disease, angina, HLD, HTN, CHF?, 

history of recurrent Cdiff s/p fecal transplant in 2018, CKD with solitary R 

kidney, history of papillary urothelial CA, who lives at home with her  

and they have been ill for approximately 2 weeks who is now being admitted for 

covid-19 PNA with related pancytopenia, dehydration and lactic acidosis.





PLAN:





Covid-19 PNA: hypoxemia, fever, dyspnea, elevated inflammatory markers


-start remdesevir and dexamethasone, day 1


-supplemental O2 to goal >89%


-PRN albuterol q4hp


-BID combivent


-encourage awake proning


-q4h sats with vitals


-incentive spirometry


-inflammatory markers per covid protocol


-PRN ibuprofen for fever


-tramadol per home script for severe pain


-will give 1L NS at 100cc/hr








Potential superimposed CAP given fever, elevated procal, despite negative 

imaging


-will give empiric levofloxacin PO


-urine strep, legionella and check mycoplasma


-if she is expectorating mucus, will send sputum Cx





Lactic acidosis: i/s/o dehydration and severe illness


-s/p IVF in ED, recheck at 4h


-will give 1L NS at 100cc/hr





CKD: stable


-daily BMP





HTN:


-continue home ARB and toprol XL





Diarrhea: likely 2/2 covid but given history of cdiff will check GI panel


-f/u GI panel


-continue home probiotic as ACHS





GERD:


-continue H2B and PPI per home script





CAD with chronic angina, PVD with stents:


-continue ASA, plavix, ranolazine, PRN SLN, toprol XL





Depression:


-continue home lexapro





Hx of gout:


-continue home allopurinol





Hypomagnesemia:


-replete PRN





Chronic anemia:


-to continue home B12 on discharge 





Pancytopenia likely 2/2 covid-19 infection


-monitor daily CBC, goal hgb >8, platelets >20 if febrile, >50 if bleeding





DVT ppx: TEDs and SCDs





Deconditioning:


-PT/OT





Vital Signs





Vital Signs








  Date Time  Temp Pulse Resp B/P (MAP) Pulse Ox O2 Delivery O2 Flow Rate FiO2


 


10/26/21 14:01 100.1       


 


10/26/21 13:53     91 Room Air  


 


10/26/21 12:44  77      


 


10/26/21 11:40   20 172/72 (105)    











Laboratory Data


Labs 24H


Laboratory Tests 2


10/26/21 11:48: 


Immature Granulocyte % (Auto) 4.3H, Neutrophils (%) (Auto) 38.6, Lymphocytes (%)

(Auto) 33.3, Monocytes (%) (Auto) 23.8H, Eosinophils (%) (Auto) 0.0, Basophils 

(%) (Auto) 0.0, Neutrophils # (Auto) 0.8L, Lymphocytes # (Auto) 0.7L, Monocytes 

# (Auto) 0.5, Eosinophils # (Auto) 0.0, Basophils # (Auto) 0.0, Nucleated Red 

Blood Cells % (auto) 1.0H, Immature Platelet Fraction 8.9, Anion Gap 9, 

Glomerular Filtration Rate 32.6L, Calcium Level 8.2L, Magnesium Level 1.4L, 

Ferritin 621H, Total Bilirubin 0.4, Aspartate Amino Transf (AST/SGOT) 37, 

Alanine Aminotransferase (ALT/SGPT) 27, Alkaline Phosphatase 45, Lactate 

Dehydrogenase 380H, Total Creatine Kinase 161, Creatine Kinase MB 2.4, Creatine 

Kinase MB Relative Index 1.49, Troponin I < 0.02, C-Reactive Protein, 

Quantitative 10.00H, Total Protein 6.6, Albumin 3.3, Albumin/Globulin Ratio 

1.0L, Procalcitonin 0.69


10/26/21 12:36: 


Prothrombin Time 13.7, Prothromb Time International Ratio 1.01, Activated 

Partial Thromboplast Time 27.6, Fibrinogen 541H, Lactic Acid Level 2.3*H


10/26/21 13:04: Bedside Glucose (Misc Panel) 140H


10/26/21 13:47: 


Urine Color YELLOW, Urine Appearance CLOUDYH, Urine pH 5.0, Urine Specific 

Gravity 1.017, Urine Protein 2+H, Urine Glucose (UA) NEGATIVE, Urine Ketones 

NEGATIVE, Urine Blood 1+H, Urine Nitrite NEGATIVE, Urine Bilirubin NEGATIVE, 

Urine Urobilinogen 0.2, Urine Leukocyte Esterase NEGATIVE, Urine WBC (Auto) 4H, 

Urine RBC (Auto) 3, Urine Hyaline Casts (Auto) 0, Urine Bacteria (Auto) 

NEGATIVE, Urine Squamous Epithelial Cells 12, Urine Mucus (Auto) SMALL, Urine 

Sperm (Auto)


CBC/BMP


Laboratory Tests


10/26/21 11:48








Microbiology





Microbiology


10/26/21 Gastrointestinal Tract Panel (PCR) - Final, Complete


           


10/26/21 Blood Culture, Received


           Pending


10/26/21 Blood Culture, Received


           Pending


10/26/21 Respiratory Virus Panel (PCR) ([facility]) - Final, Complete


           SARS-CoV-2 (COVID 19)





Home Medications


Scheduled


Alirocumab (Praluent Pen) 75 Mg/1 Ml Pen.injctr, 1 ML SC MTHLY


Aspirin (Aspirin EC) 325 Mg Tabec, 325 MG PO DAILY


Cholecalciferol (Vitamin D3) (Vitamin D3) 25 Mcg Capsule, 25 MCG PO QWEEK


Clopidogrel Bisulfate (Plavix) 75 Mg Tab, 75 MG PO DAILY


Cyanocobalamin (Vitamin B-12) (Vitamin B-12) 100 Mcg Tablet, 100 MCG PO QWEEK


Escitalopram Oxalate (Lexapro) 10 Mg Tablet, 10 MG PO DAILY


Famotidine (Famotidine) 40 Mg Tablet, 40 MG PO QHS


Icosapent Ethyl (Vascepa) 1 Gm Capsule, 2 GM PO BID


Lactobacillus Acidophilus (Probiotic) 1 Each Capsule, 1 CAP PO DAILY


Meclizine HCl (Meclizine HCl) 25 Mg Tab, 25 MG PO DAILY


Metoprolol Succinate (Metoprolol Succinate) 25 Mg Tab.er.24h, 25 MG PO BID


Nitroglycerin (Nitrostat) 0.4 Mg Subl, 0.4 MG SL NITRO


Nitroglycerin (Nitroglycerin Patch) 0.4 Mg/Hr Dis, 0.4 MG TD DAILYPRN


Pantoprazole Sodium (Pantoprazole Sodium) 40 Mg Tablet.dr, 40 MG PO DAILY


Ranolazine (Ranexa) 1,000 Mg Tab, 1,000 MG PO BID


Valsartan (Valsartan) 80 Mg Tablet, 160 MG PO DAILY


Zinc (Zinc) 50 Mg Tablet, 50 MG PO DAILY


allopurinoL (allopurinoL) 300 Mg Tablet, 300 MG PO DAILY





Scheduled PRN


Albuterol Sulfate (Proair Hfa) 8.5 Gm Hfa.aer.ad, 2 PUFF INH Q6H PRN for 

SOB/WHEEZING


Promethazine HCl (Promethazine HCl) 25 Mg Tab, 25 MG PO Q6HP PRN for NAUSEA


Tramadol HCl (Tramadol HCl) 50 Mg Tab, 50 MG PO TID PRN for PAIN





Miscellaneous Medications


[Med Note]


   PT STATED TAKEN NO MEDS WITHIN THE PAST WEEK FROM NOT FEELING GOOD. 





Allergies


Coded Allergies:  


     TAPE (Verified  Allergy, Intermediate, HIVES, 10/26/18)


     nitazoxanide (Verified  Allergy, Mild, RASH?, 8/3/19)


     Sulfa (Sulfonamide Antibiotics) (Verified  Allergy, Unknown, unknown, 

8/3/19)


     butalbital (Verified  Allergy, Unknown, unknown, 8/3/19)


     carvedilol (Verified  Allergy, Unknown, 10/26/21)


     yellow dye (Verified  Allergy, Unknown, only one kidney, 8/3/19)


     Tetracyclines (Verified  Adverse Reaction, Intermediate, nausea, 10/26/21)


     clindamycin (Verified  Adverse Reaction, Intermediate, vomits, 10/26/21)


     erythromycin base (Verified  Adverse Reaction, Intermediate, diarrhea, 

10/26/21)


     pentoxifylline (Verified  Adverse Reaction, Mild, GI N/V/D, 8/3/19)


     acetaminophen (Verified  Adverse Reaction, Unknown, 'dont mix with my heart

meds', 8/3/19)


     caffeine (Verified  Adverse Reaction, Unknown, because of my heart, 8/3/19)





A-FIB/CHADSVASC


A-FIB History


Current/History of A-Fib/PAF?:  No


Current PO Anticoag Therapy:  No





Age/Risk Factor Scoring


CHADSVASC:  








CHADSVASC Response (Comments) Value


 


Age Risk Factor Age >/= 75 years old 2


 


Gender Risk Factor Female 1


 


Hx of CHF Yes 1


 


Hx of HTN Yes 1


 


Hx of Stroke/TIA/or VTE No 0


 


Hx of Diabetes No 0


 


Hx of Vascular Disease Yes 1


 


Total  6











Treatment


Treatment ordered:  NONE


Reason Anticoagulant not given:  Not indicated/[id number]











SARAH BUSTAMANTE MD   Oct 26, 2021 16:09

## 2021-10-26 NOTE — ECGEPIP
[facility] - ED

                                       

                                       Test Date:    2021-10-26

Pat Name:     LORI TOWNSEND            Department:   

Patient ID:   [id number]                 Room:         -

Gender:       Female                   Technician:   [id number]

:          1945               Requested By: Maja Lundborg-Gray 

Order Number: [id number]     Reading MD:   Maja Lundborg-Gray

                                 Measurements

Intervals                              Axis          

Rate:         78                       P:            67

MD:           152                      QRS:          -52

QRSD:         130                      T:            57

QT:           444                                    

QTc:          506                                    

                           Interpretive Statements

Normal sinus rhythm

Right bundle branch block

Left anterior fascicular block

*** Bifascicular block ***

Minimal voltage criteria for LVH, may be normal variant ( R in aVL )

T wave abnormality, consider lateral ischemia

 

 

cw 10/2/19 rate increased

Nonspecific ST T wave changes

Electronically Signed on 10- 16:02:30 EDT by Maja Lundborg-Gray

## 2021-10-26 NOTE — CCD
Continuity of Care Document (CCD)

                             Created on: 2021



Donnell Eryn M

External Reference #: [id number]

: 1945

Sex: Female



Demographics





                          [place] [address]

 

                          Home Phone                [phone]

 

                          Preferred Language        Unknown

 

                          Marital Status            Unknown

 

                          Christian Affiliation     Unknown

 

                          Race                      White

 

                          Ethnic Group              Not  or 





Author





                          Organization              Unknown

 

                          Address                   Unknown

 

                          Phone                     Unavailable







Care Team Providers





                    Care Team Member Name Role                Phone

 

                    Sumaya Martinez PA-C [facility]                [phone]

 

                    José Luis Short MD  [facility]                [phone]

 

                    Tera Morgan MD [facility]                [phone]

 

                    Halle Fallon RN  [facility]                [phone]







Problems





                    Active Problems     Provider            Date

 

                    History of coronary artery bypass grafting Dennis Ballard MD Onset: 

06/15/2021

 

                          Patient post percutaneous transluminal coronary angiop

lasty Dennis Ballard MD

                                        Onset: 06/15/2021

 

                    Essential hypertension Dennis Ballard MD Onset: 06/15/202

1

 

                    Aortic valve disorder Dennis Ballard MD Onset: 06/15/2021

 

                    Chest pain          Dennis Ballard MD Onset: 06/15/2021

 

                    Mixed hyperlipidemia Dennis Ballard MD Onset: 06/15/2021

 

                    Dietary management surveillance Dennis Ballard MD Onset: 

06/15/2021

 

                          Coronary arteriosclerosis after percutaneous coronary 

angioplasty Dennis Ballard MD                             Onset: 06/15/2021

 

                    Overweight          Dennis Ballard MD Onset: 06/15/2021

 

                    Electrocardiogram abnormal Dennis Ballard MD Onset: 06/15

/2021

 

                          Right bundle branch block AND left anterior fascicular

 block Dennis Ballard MD                                      Onset: 06/15/2021

 

                    Old myocardial infarction Dennis Ballard MD Onset: 06/15/

2021

 

                    Palpitations        Dennis Ballard MD Onset: 06/15/2021

 

                    Syncope and collapse Dennis Ballard MD Onset: 06/15/2021

 

                    Dizziness and giddiness Dennis Ballard MD Onset: 06/15/20

21







Social History





                Type            Date            Description     Comments

 

                Birth Sex                       Unknown          

 

                ETOH Use                        Rarely consumes alcohol  

 

                Tobacco Use     Start: Unknown End: Unknown Patient is a former 

smoker started at 

age 9, at most 1 ppd, quit in  

 

                Smoking Status  Reviewed: 07/15/21 Patient is a former smoker st [facility] at age 9, 

at most 1 ppd, quit in  

 

                Exercise Type/Frequency                 Does not exercise curren

tly  

 

                Exercise Limitations                 Back Pain        

 

                Exercise Limitations                 Joint Pain      bilateral a

marjorie and legs 

 

                Exercise Limitations                 Claudication     







Allergies, Adverse Reactions, Alerts





             Active Allergies Reaction     Severity     Comments     Date

 

             Amoxicillin                                         06/15/2021

 

             Atorvastatin                                        06/15/2021

 

             Tetracycline                                        06/15/2021

 

             Buspirone                                           06/15/2021

 

             Adhesives                                           06/15/2021

 

             Alinia                                              06/15/2021

 

             Carvedilol                             Nausea       07/15/2021

 

             Repatha                                Muscle Cramps 07/15/2021







Medications





           Active Medications SIG        Qnty       Indications Ordering Provide

r Date

 

                          Amlodipine Besylate                     2.5mg Tablets 

                  1 by 

mouth twice a day (hold dose for systolic BP <135) 180tabs                      

           Dennis Ballard MD                                      07/15/2021

 

                          Metoprolol Tartrate                     25mg Tablets  

                 Half 

tablet by mouth twice a day                 I10             Unknown         

 

                          Nitroglycerin                     0.6mg/HR Patches 24H

R                   1 

patch applied to skin every day (keep on 14 hours daily then remove) 30units    

               

I25.10                    Dennis Ballard MD      06/15/2021

 

                          Tylenol Extra Strength                     500mg Table

ts                   2 by 

mouth twice daily as needed                                 Unknown         

 

                          Allopurinol                     300mg Tablets         

          1 by mouth every

day                                             Unknown         2021

 

                                        Vitamin D (Ergocalciferol)              

       1.25mg (67044 Ut) Capsules       

             1 by mouth every weekly                           Unknown      

 

                                        Vitamin Deficiency Injectable System-B12

                     1000mcg/ML Kit     

             inject 1 kit once monthly                           Unknown      

 

                          Ranexa                     500mg Tablets ER 12HR      

             2 by mouth 

twice a day                                     Unknown         2021

 

                          Meclizine HCL                     25mg Chewtabs       

            1 by mouth 

four times daily as needed                                 Unknown         

 

                          Metoclopramide HCL                     10mg Tablets   

                1 by mouth

four times daily                                 Unknown         2021

 

                          Pantoprazole Sodium                     40mg Tablets D

R                   2 by 

mouth every day                                 Unknown         2021

 

                          Tramadol HCL                     50mg Tablets         

          2 by mouth every

8 hours as needed, as directed                                 Unknown         0

2021

 

                    Plavix                     75mg Tablets                   1 

by mouth every day  

                                        Unknown             2021

 

                          Aspirin                     325mg Tablets DR          

         1 by mouth every 

day                                             Unknown         2021

 

                          Nitroglycerin                     0.4mg Tablets Sub   

                1 tab sl 

every 5 min times 3 doses as needed chest discomfort                            

     Unknown         2021

 

                          Febuxostat                     40mg Tablets           

        1 by mouth every 

daily                                           Unknown         2021

 

                          Escitalopram Oxalate                     10mg Tablets 

                  1 by 

mouth every day                                 Unknown         2021

 

                          Valsartan                     80mg Tablets            

       2 by mouth daily at

bedtime                                         Unknown         2021

 

                                        Albuterol Sulfate HFA                   

  108(90Base) mcg/Act Aerosol           

             inhale two puffs as needed every 4 hours                           

Unknown      2021

 

                                        History Medications

 

                                        Repatha Pushtronex System               

      420mg/3.5ML Solution Cartridge    

              420 mg (3.5 ml) sq qmo 10.5ml       E78.2        Dennis Ballard MD 

06/15/2021 - 2021

 

                          Carvedilol                     3.125mg Tablets        

           1 by mouth 

twice a day     60tabs          I10             Dennis Ballard MD 06/15/2021 

- 2021

 

                                        I25.2

 

                                        R07.9

 

                          Amlodipine Besylate                     2.5mg Tablets 

                  1 by 

mouth every day as needed for elevated BP                                 Unknow

n         2021 - 07/15/2021

 

                          Metoprolol Tartrate                     25mg Tablets  

                 1 by 

mouth twice a day                 I10             Unknown         2021 - 0

6/15/2021

 

                                        I25.2

 

                                        R07.9

 

                          Covid-19 vaccine, Unspecified                      Inj

ection                    

                                                Unknown         







Immunizations





                                        Description

 

                                        No Information Available







Vital Signs





                Date            Vital           Result          Comment

 

                07/15/2021 10:44am Weight          155.00 lb        

 

                    Home Weight         153lb               home weight

 

                    Height              61 inches           5'1"

 

                    BMI (Body Mass Index) 29.3 kg/m2           

 

                    Heart Rate          55 /min              

 

                    BP Systolic Sitting 148 mmHg            CBP, adult cuff/Ra

 

                    BP Diastolic Sitting 63 mmHg             CBP, adult cuff/Ra

 

                06/15/2021 12:15pm Weight          157.00 lb        

 

                    Home Weight         157lb               home weight

 

                    Height              61 inches           5'1"

 

                    BMI (Body Mass Index) 29.7 kg/m2           

 

                    Heart Rate          56 /min              

 

                    BP Systolic Sitting 152 mmHg            CBP, adult cuff/Ra

 

                    BP Diastolic Sitting 63 mmHg             CBP, adult cuff/Ra







Results





        Test    Acquired Date Facility Test    Result  H/L     Range   Note

 

                    Lipid Panel         2021          [facility]

nter

           [phone] Triglycerides Level 316 mg/dL  High       <150        

 

             Cholesterol Level 191 mg/dL    Normal       <200          

 

             HDL Cholesterol 43 mg/dL     Normal       >40           

 

             LDL Cholesterol 85 mg/dL     Normal       <100          

 

             Non-HDL-C    148 mg/dL    Normal                     

 

             Cholesterol Risk Ratio 4.441        Normal       <5            

 

                    Laboratory test finding 2021          [facility]

           [phone] Vitamin B12 Level 689 pg/mL  Normal     247-911    1

 

                    CMP                 2021          [facility]

             Albumin Serum/Plasma 4.1                                     

 

             Alt - SGPT   31                                      

 

             Calcium Ser/Plasma Mass/Vol 9.5                                    

 

 

             Carbon Dioxide Ser/Plasm 24                                      

 

             Chloride Serum/Plasma 105                                     

 

             Alkaline Phosphatase 52                                      

 

             Potassium    4.4                                     

 

             Protein Total 7.8                                     

 

             Sodium       140                                     

 

             Ast - Sgot   18                                      

 

             BUN - Urea Nitrogen 30                                      

 

             Glucose      96                                      

 

             Creatinine For GFR 1.69                                    

 

                    CBC without Differential 2021          [facility]

e

             White Blood Count 8.1                                     

 

             Red Blood Count 3.05                                    

 

             Platelets    173                                     

 

             Hemoglobin   11.0                                    

 

             Hematocrit   32.4                                    

 

                    Laboratory test finding 2021          [facility]

             Thyroid Stimulating Hormone 2.105                                  

 







                          1                         VITAMIN B12 NORMAL RANGE



NORMAL                     247 - 911 PG/ML

INDETERMINATE              211 - 246 PG/ML

DEFICIENT              LESS THAN 211 PG/ML









Procedures





                Date            Code            Description     Status

 

                07/15/2021      63101           Office/Outpatient Established Lo

w MDM 20-29 Min Completed

 

                    07/15/2021          73849               Arterial Pressure Wa

veform Analysis For Assessment Of Central 

Art                                     Completed

 

                06/15/2021      05021           Office/Outpatient New Moderate M

DM 45-59 Minutes Completed

 

                    06/15/2021          58300               Arterial Pressure Wa

veform Analysis For Assessment Of Central 

Art                                     Completed

 

                06/15/2021      32057           ECG 12-Lead     Completed







[employer]





                                        Description

 

                                        No Information Available







Encounters





           Type       Date       Location   Provider   Dx         Diagnosis

 

           Office Visit 07/15/2021 11:00a Main Office Dennis Ballard MD I25.1

0     [facility] 

heart disease of native coronary artery w/o ang pctrs

 

                          I10                       Essential (primary) hyperten

ayse

 

           Office Visit 06/15/2021 12:00p Main Office Dennis Ballard MD I25.1

0     [facility] 

heart disease of native coronary artery w/o ang pctrs

 

                          I25.2                     Old myocardial infarction

 

                          Z95.1                     Presence of aortocoronary by

pass graft

 

                          Z95.5                     Presence of coronary angiopl

asty implant and graft

 

                          R07.9                     Chest pain, unspecified

 

                          I10                       Essential (primary) hyperten

ayse

 

                          I35.0                     Nonrheumatic aortic (valve) 

stenosis

 

                          R94.31                    Abnormal electrocardiogram [

ECG] [EKG]

 

                          R55                       Syncope and collapse

 

                          I45.2                     Bifascicular block

 

                          R00.2                     Palpitations

 

                          R42                       Dizziness and giddiness

 

                          E78.2                     Mixed hyperlipidemia

 

                          E66.3                     Overweight

 

                          Z71.3                     Dietary counseling and surve

illance







Assessments





                Date            Code            Description     Provider

 

                    07/15/2021          I25.10              Atherosclerotic hear

t disease of native coronary artery 

without angina pectoris                 Dennis Ballard MD

 

                07/15/2021      I10             Essential (primary) hypertension

 Dennis Ballard MD

 

                    06/15/2021          I25.10              Atherosclerotic hear

t disease of native coronary artery 

without angina pectoris                 Dennis Ballard MD

 

                06/15/2021      I25.2           Old myocardial infarction Dennis Ballard MD

 

                06/15/2021      Z95.1           Presence of aortocoronary bypass

 graft Dennis Ballard MD

 

                06/15/2021      Z95.5           Presence of coronary angioplasty

 implant and graft Dennis Ballard MD

 

                06/15/2021      R07.9           Chest pain, unspecified Dennis Ballard MD

 

                06/15/2021      I10             Essential (primary) hypertension

 Dennis Ballard MD

 

                06/15/2021      I35.0           Nonrheumatic aortic (valve) sten

osis Dennis Ballard MD

 

                06/15/2021      R94.31          Abnormal electrocardiogram [ECG]

 [EKG] Dennis Ballard MD

 

                06/15/2021      R55             Syncope and collapse Dennis soria MD

 

                06/15/2021      I45.2           Bifascicular block Dennis henderson MD

 

                06/15/2021      R00.2           Palpitations    Dennis Ballard MD

 

                06/15/2021      R42             Dizziness and giddiness Dennis Ballard MD

 

                06/15/2021      E78.2           Mixed hyperlipidemia Dennis soria MD

 

                06/15/2021      E66.3           Overweight      Dennis Ballard MD

 

                06/15/2021      Z71.3           Dietary counseling and surveilla

nce Dennis Ballard MD







Plan of Treatment

Future Appointment(s):* 2021 12:15 pm - Dennis Ballard MD at Main 
  Office

* 08/10/2021 12:30 pm - Cardiac PET at Main Office

* 2021  1:00 pm - ECHO at Main Office

07/15/2021 - Dennis Ballard MD* I25.10 Atherosclerotic heart disease of 
  native coronary artery without angina pectoris* New Xrays:* PET Myocardial 
  Perfusion Multi Study AT [facility], Scheduled: 08/10/21



* Recommendations:* Now that my office has access to cardiac PET myocardial 
  perfusion imaging which is a superior diagnostic modality in comparison to 
  stress SPECT myocardial perfusion imaging, I have canceled the stress SPECT 
  myocardial perfusion imaging study that I ordered previously and ordered 
  cardiac PET myocardial perfusion imaging instead (to define her coronary 
  prognosis). Metoprolol tartrate was decreased from 25 mg twice a day down to 
  12.5 mg twice a day with the hope of providing a faster heart rate which in 
  turn should improve the supply/demand ratio as evidenced in the observed 
  central BP waveform analysis today. Continue nitroglycerin patch, valsartan, 
  amlodipine, nitroglycerin sublingual, aspirin, clopidogrel, Ranexa.





* I10 Essential (primary) hypertension* Recommendations:* Metoprolol tartrate 
  was decreased to 12.5 mg twice a day. Continue valsartan and nitroglycerin 
  patch at the current dosages. Amlodipine 2.5 mg twice a day when necessary for
   systolic BP >160 as the patient is currently taking it was changed to 
  amlodipine 2.5 mg twice a day with instruction to hold dose for systolic BP <
  135 mmHg.





* All * New Medication:* Amlodipine Besylate 2.5 mg - 1 by mouth twice a day 
  (hold dose for systolic BP <135)



* Follow up:* Clinic visit in 8 weeks with Dr. Ballard.









Functional Status





                Functional Condition Comment         Date            Status

 

                Independent with all ADL's                                 Activ

e







Mental Status





                                        Description

 

                                        No Information Available







Referrals





                                        Description

 

                                        No Information Available

## 2021-10-26 NOTE — CCD
Summarization Of Episode

                             Created on: 10/26/2021



LORI TOWNSEND

External Reference #: [id number]

: 1945

Sex: Undifferentiated



Demographics





                          Address                   [address] [place] [address]  [address]

 

                          Home Phone                [phone]

 

                          Preferred Language        English

 

                          Marital Status            Unknown

 

                          Yazidi Affiliation     Unknown

 

                          Race                      Unknown

 

                          Ethnic Group              Not  or 





Author





                          Author                    [facility]

 

                          Organization              [facility]

 

                          Address                   Unknown

 

                          Phone                     Unavailable







Support





                Name            Relationship    Address         Phone

 

                    LIZBETH TOWNSEND(SKIP) Next Of Kin         [address]  [address]                    [phone]

 

                    GLORIA TOWNSENDO       Next Of Kin         [address] [place] [address]  [address]                  [phone]

 

                RETIRED         Next Of Kin     Unknown         [phone]

 

                    NIYA COUNTRYMEN Next Of Kin         [address] [place]  [address]                [phone]

 

                    ROSCOE TOWNSEND        Next Of Kin         [address] [place] [address]  [address]                [phone]

 

                RE              Next Of Kin     Unknown         Unavailable

 

                    PROWFIDEL        Next Of Kin         [address] [facility] [address]

[address]

[address]  [address] [place]  [address]                  [phone]

 

                DISABLED        Next Of Kin     Unknown         Unavailable

 

                    LENCHO,  NIYA Next Of Kin         [address] [place]  [address]                     [phone]

 

                    GLORIA TOWNSENDOE      Next Of Kin         [address]

[address] [place] [address]  [address]                  [phone]

 

                    GLORIA TOWNSENDOE      Next Of Kin         [address] [facility]

                                        [id number]

[place]  [address]                  Unavailable

 

                TownsendGloria sanchez[place]  [address] [phone]









Care Team Providers





                    Care Team Member Name Role                Phone

 

                    ROSA ISELA Fallon NP    Unavailable         Unavailable

 

                    Leeanne, L Halle NP    Unavailable         Unavailable

 

                    Leeanne, L Halle NP    Unavailable         Unavailable

 

                    Leeanne, L Halle NP    Unavailable         Unavailable

 

                    Leeanne, L Halle NP    Unavailable         Unavailable

 

                    Leeanne, L Halle NP    Unavailable         Unavailable

 

                    Leeanne, L Halle NP    Unavailable         Unavailable

 

                    Leeanne, L Halle NP    Unavailable         Unavailable

 

                    Leeanne, L Halle NP    Unavailable         Unavailable

 

                    Leeanne, L Halle NP    Unavailable         Unavailable

 

                    Leeanne, L Halle NP    Unavailable         Unavailable

 

                    Leeanne, L Halle NP    Unavailable         Unavailable

 

                    Leeanne, L Halle NP    Unavailable         Unavailable

 

                    Leeanne, L Halle NP    Unavailable         Unavailable

 

                    Leeanne, L Halle NP    Unavailable         Unavailable

 

                    Leeanne, L Halle NP    Unavailable         Unavailable

 

                    Leeanne, L Halle NP    Unavailable         Unavailable

 

                    Leeanne, L Halle NP    Unavailable         Unavailable

 

                    Leeanne, L Halle NP    Unavailable         Unavailable

 

                    Leeanne, L Halle NP    Unavailable         Unavailable

 

                    Leeanne, L Halle NP    Unavailable         Unavailable

 

                    Leeanne, L Halle NP    Unavailable         Unavailable

 

                    Leeanne, L Halle NP    Unavailable         Unavailable

 

                    Leeanne, L Halle NP    Unavailable         Unavailable

 

                    Leeanne, L Halle NP    Unavailable         Unavailable

 

                    Gold Bar, L Bina RNP Unavailable         Unavailable

 

                    Jomar, L Bina RNP Unavailable         Unavailable

 

                    Jomar, L Bina RNP Unavailable         Unavailable

 

                    Gold [place], L Bina RNP Unavailable         Unavailable

 

                    Jomar, L Bina RNP Unavailable         Unavailable

 

                    Jomar, L Bina RNP Unavailable         Unavailable

 

                    Gold [place], L Bina RNP Unavailable         Unavailable

 

                    Gold Bar, L Bina RNP Unavailable         Unavailable

 

                    Jomar, L Bina RNP Unavailable         Unavailable

 

                    Jomar, L Bina RNP Unavailable         Unavailable

 

                    Jomar, L Bina RNP Unavailable         Unavailable

 

                    Gold Bar, L Bina RNP Unavailable         Unavailable

 

                    Gold Bar, L Bina RNP Unavailable         Unavailable

 

                    Gold Bar, L Bina RNP Unavailable         Unavailable

 

                    Jomar, L Bina RNP Unavailable         Unavailable

 

                    Jomar, L Bina RNP Unavailable         Unavailable

 

                    Gold Bar, L Bina RNP Unavailable         Unavailable

 

                    Jomar, L Bina RNP Unavailable         Unavailable

 

                    Gold Bar, L Bina RNP Unavailable         Unavailable

 

                    Jomar, L Bina RNP Unavailable         Unavailable

 

                    Gold Bar, L Bina RNP Unavailable         Unavailable

 

                    Gold Bar, L Bina RNP Unavailable         Unavailable

 

                    Gold Bar, L Bina RNP Unavailable         Unavailable

 

                    Jomar, L Bina RNP Unavailable         Unavailable

 

                    Jomar, L Bina RNP Unavailable         Unavailable

 

                    Gold Bar, L Bina RNP Unavailable         Unavailable

 

                    Gold Bar, L Bina RNP Unavailable         Unavailable

 

                    Jomar, L Bina RNP Unavailable         Unavailable

 

                    Jomar, L Bina RNP Unavailable         Unavailable

 

                    Jomar, L Bina RNP Unavailable         Unavailable

 

                    Gold Bar, L Bina RNP Unavailable         Unavailable

 

                    Gold Bar, L Bina RNP Unavailable         Unavailable

 

                    Gold Bar, L Bina RNP Unavailable         Unavailable

 

                    Gold Bar, L Bina RNP Unavailable         Unavailable

 

                    Gold Bar, L Bina RNP Unavailable         Unavailable

 

                    Gold Bar, L Bina RNP Unavailable         Unavailable

 

                    Gold [place], L Bina RNP Unavailable         Unavailable

 

                    Jomar, L Bina RNP Unavailable         Unavailable

 

                    Gold Bar, L Bina RNP Unavailable         Unavailable

 

                    BLOCK SR, MESHA VELASQUEZ MD Unavailable         Unavailable

 

                    BLOCK SR, MESHA VELASQUEZ MD Unavailable         Unavailable

 

                    BLOCK SR, MESHA VELASQUEZ MD Unavailable         Unavailable

 

                    BLOCK SR, MESHA VELASQUEZ MD Unavailable         Unavailable

 

                    BLOCK SR, MESHA VELASQUEZ MD Unavailable         Unavailable

 

                    BLOCK SR, MESHA VELASQUEZ MD Unavailable         Unavailable

 

                    BLOCK SR, MESHA VELASQUEZ MD Unavailable         Unavailable

 

                    BLOCK SR, MESHA VELASQUEZ MD Unavailable         Unavailable

 

                    BLOCK SR, MESHA VELASQUEZ MD Unavailable         Unavailable

 

                    BLOCK SR, MESHA VELASQUEZ MD Unavailable         Unavailable

 

                    BLOCK SR, MESHA VELASQUEZ MD Unavailable         Unavailable

 

                    BLOCK SR, MESHA VELASQUEZ MD Unavailable         Unavailable

 

                    BLOCK SR, MESHA VELASQUEZ MD Unavailable         Unavailable

 

                    BLOCK SR, MESHA VELASQUEZ MD Unavailable         Unavailable

 

                    BLOCK SR, MESHA VELASQUEZ MD Unavailable         Unavailable

 

                    BLOCK SR, MESHA VELASQUEZ MD Unavailable         Unavailable

 

                    BLOCK SR, MESHA VELASQUEZ MD Unavailable         Unavailable

 

                    BLOCK SR, MESHA VELASQUEZ MD Unavailable         Unavailable

 

                    BLOCK SR, MESHA VELASQUEZ MD Unavailable         Unavailable

 

                    BLOCK SR, MESHA VELASQUEZ MD Unavailable         Unavailable

 

                    BLOCK SR, MESHA VELASQUEZ MD Unavailable         Unavailable

 

                    BLOCK SR, MESHA VELASQUEZ MD Unavailable         Unavailable

 

                    BLOCK SR, MESHA VELASQUEZ MD Unavailable         Unavailable

 

                    BLOCK SR, MESHA VELASQUEZ MD Unavailable         Unavailable

 

                    BLOCK SR, MESHA VELASQUEZ MD Unavailable         Unavailable

 

                    BLOCK SR, MESHA VELASQUEZ MD Unavailable         Unavailable

 

                    BLOCK SR, MESHA VELASQUEZ MD Unavailable         Unavailable

 

                    BLOCK SR, MESHA VELASQUEZ MD Unavailable         Unavailable

 

                    BLOCK SR, MESHA VELASQUEZ MD Unavailable         Unavailable

 

                    BLOCK SR, MESHA VELASQUEZ MD Unavailable         Unavailable

 

                    BLOCK SR, MESHA VELASQUEZ MD Unavailable         Unavailable

 

                    BLOCK SR, MESHA VELASQUEZ MD Unavailable         Unavailable

 

                    BLOCK SR, MESHA VELASQUEZ MD Unavailable         Unavailable

 

                    BLOCK SR, MESHA VELASQUEZ MD Unavailable         Unavailable

 

                    BLOCK SR, MESHA VELASQUEZ MD Unavailable         Unavailable

 

                    BLOCK SR, MESHA VELASQUEZ MD Unavailable         Unavailable

 

                    BLOCK SR, MESHA VELASQUEZ MD Unavailable         Unavailable

 

                    BLOCK SR, MESHA VELASQUEZ MD Unavailable         Unavailable

 

                    BLOCK SR, MESHA VELASQUEZ MD Unavailable         Unavailable

 

                    BLOCK SR, MESHA VELASQUEZ MD Unavailable         Unavailable

 

                    BLOCK SR, MESHA VELASQUEZ MD Unavailable         Unavailable

 

                    BLOCK SR, MESHA VELASQUEZ MD Unavailable         Unavailable

 

                    BLOCK SR, MESHA VELASQUEZ MD Unavailable         Unavailable

 

                    BLOCK SR, MESHA VELASQUEZ MD Unavailable         Unavailable

 

                    BLOCK SR, MESHA VELASQUEZ MD Unavailable         Unavailable

 

                    BLOCK SR, MESHA VELASQUEZ MD Unavailable         Unavailable

 

                    BLOCK SR, MESHA VELASQUEZ MD Unavailable         Unavailable

 

                    BLOCK SR, MESHA VELASQUEZ MD Unavailable         Unavailable

 

                    BLOCK SR, MESHA VELASQUEZ MD Unavailable         Unavailable

 

                    BLOCK SR, MESHA VELASQUEZ MD Unavailable         Unavailable

 

                    BLOCK SR, MESHA VELASQUEZ MD Unavailable         Unavailable

 

                    BLOCK SR, MESHA VELASQUEZ MD Unavailable         Unavailable

 

                    BLOCK SR, MESHA VELASQUEZ MD Unavailable         Unavailable

 

                    BLOCK SR, MESHA VELASQUEZ MD Unavailable         Unavailable

 

                    BLOCK SR, MESHA VELASQUEZ MD Unavailable         Unavailable

 

                    BLOCK SR, MESHA VELASQUEZ MD Unavailable         Unavailable

 

                    Jules  Joselito DO    Unavailable         Unavailable

 

                    Juan, M Umair PA-C Unavailable         Unavailable

 

                    Juan, M Umair PA-C Unavailable         Unavailable

 

                    Juan, M Umair PA-C Unavailable         Unavailable

 

                    Juan, M Umair PA-C Unavailable         Unavailable

 

                    Juan, M Umair PA-C Unavailable         Unavailable

 

                    Juan, M Umair PA-C Unavailable         Unavailable

 

                    Juan, M Umair PA-C Unavailable         Unavailable

 

                    Juan, M Umair PA-C Unavailable         Unavailable

 

                    Juan, M Umair PA-C Unavailable         Unavailable

 

                    Juan, M Umair PA-C Unavailable         Unavailable

 

                    Juan, M Umair PA-C Unavailable         Unavailable

 

                    Juan, M Umair PA-C Unavailable         Unavailable

 

                    Juan, M Umair PA-C Unavailable         Unavailable

 

                    Juan, M Umair PA-C Unavailable         Unavailable

 

                    Juan, M Umair PA-C Unavailable         Unavailable

 

                    Juan, M Umair PA-C Unavailable         Unavailable

 

                    Juan, M Umair PA-C Unavailable         Unavailable

 

                    Juan, M Umair PA-C Unavailable         Unavailable

 

                    Juan, M Umair PA-C Unavailable         Unavailable

 

                    Ujan, M Umair PA-C Unavailable         Unavailable

 

                    Juan, M Umair PA-C Unavailable         Unavailable

 

                    Juan, M Umair PA-C Unavailable         Unavailable

 

                    Juan, M Umair PA-C Unavailable         Unavailable

 

                    Juan, M Umair PA-C Unavailable         Unavailable

 

                    Juan, M Umair PA-C Unavailable         Unavailable

 

                    Juan, M Umair PA-C Unavailable         Unavailable

 

                    Juan, M Umair PA-C Unavailable         Unavailable

 

                    Juan, M Umair PA-C Unavailable         Unavailable

 

                    Juan, M Umair PA-C Unavailable         Unavailable

 

                    Juan, M Umair PA-C Unavailable         Unavailable

 

                    Juan, M Umair PA-C Unavailable         Unavailable

 

                    Juan, M Umair PA-C Unavailable         Unavailable

 

                    Juan, M Umair PA-C Unavailable         Unavailable

 

                    Juan, M Umair PA-C Unavailable         Unavailable

 

                    Juan, M Umair PA-C Unavailable         Unavailable

 

                    Juan, M Umair PA-C Unavailable         Unavailable

 

                    Juan, M Umair PA-C Unavailable         Unavailable

 

                    HELGA, AURORA CL PA Unavailable         Unavailable

 

                    HELGA, AURORA CL PA Unavailable         Unavailable

 

                    HELGA, AURORA CL PA Unavailable         Unavailable

 

                    HELGA, AURORA CL PA Unavailable         Unavailable

 

                    HELGA, AURORA CL PA Unavailable         Unavailable

 

                    HELGA, AURORA CL PA Unavailable         Unavailable

 

                    HELGA, AURORA CL PA Unavailable         Unavailable

 

                    HELGA, AURORA CL PA Unavailable         Unavailable

 

                    HELGA, AURORA CL PA Unavailable         Unavailable

 

                    HELGA, AURORA CL PA Unavailable         Unavailable

 

                    HELGA, AURORA CL PA Unavailable         Unavailable

 

                    HELGA, AURORA CL PA Unavailable         Unavailable

 

                    HELGA, AURORA CL PA Unavailable         Unavailable

 

                    HELGA, AURORA CL PA Unavailable         Unavailable

 

                    HELGA, AURORA CL PA Unavailable         Unavailable

 

                    HELGA, AURORA CL PA Unavailable         Unavailable

 

                    HELGA, AURORA CL PA Unavailable         Unavailable

 

                    HELGA, AURORA CL PA Unavailable         Unavailable

 

                    HELGA, AURORA CL PA Unavailable         Unavailable

 

                    HELGA, AURORA CL PA Unavailable         Unavailable

 

                    HELGA, AURORA CL PA Unavailable         Unavailable

 

                    HELGA, AURORA CL PA Unavailable         Unavailable

 

                    Mc, Reginah W Michelle FNP-C Unavailable         Unavailabl

e

 

                    Mc, Reginakian W Michelle FNP-C Unavailable         Unavailabl

e

 

                    Mc, Reginakian W Michelle FNP-C Unavailable         Unavailabl

e

 

                    Mc, Reginakian W Michelle FNP-C Unavailable         Unavailabl

e

 

                    Mc, Reginakian W Michelle FNP-C Unavailable         Unavailabl

e

 

                    Mc, Reginakian W Michelle FNP-C Unavailable         Unavailabl

e

 

                    Mc, Reginakian W Michelle FNP-C Unavailable         Unavailabl

e

 

                    Mc, Reginakian W Michelle FNP-C Unavailable         Unavailabl

e

 

                    Mc, Reginakian W Michelle FNP-C Unavailable         Unavailabl

e

 

                    Mc, Reginakian W Michelle FNP-C Unavailable         Unavailabl

e

 

                    Mc, Reginakian W Michelle FNP-C Unavailable         Unavailabl

e

 

                    Mc, Reginakian W Michelle FNP-C Unavailable         Unavailabl

e

 

                    Mc, Reginakian W Michelle FNP-C Unavailable         Unavailabl

e

 

                    Mc, Reginakian W Michelle FNP-C Unavailable         Unavailabl

e

 

                    Mc, Reginakian W Michelle FNP-C Unavailable         Unavailabl

e

 

                    Mc, Reginakian W Michelle FNP-C Unavailable         Unavailabl

e

 

                    Mc, Reginakian W Michelle FNP-C Unavailable         Unavailabl

e

 

                    Mc, Reginakian W Michelle FNP-C Unavailable         Unavailabl

e

 

                    Mc, Reginah W Michelle FNP-C Unavailable         Unavailabl

e

 

                    Mc, Reginah W Michelle FNP-C Unavailable         Unavailabl

e

 

                    Mc, Reginah W Michelle FNP-C Unavailable         Unavailabl

e

 

                    Mc, Reginah W Michelle FNP-C Unavailable         Unavailabl

e

 

                    Mc, Reginah W Michelle FNP-C Unavailable         Unavailabl

e

 

                    Mc, Reginah W Michelle FNP-C Unavailable         Unavailabl

e

 

                    Mc, Reginah W Michelle FNP-C Unavailable         Unavailabl

e

 

                    Mc, Reginah W Michelle FNP-C Unavailable         Unavailabl

e

 

                    Mc, Reginah W Michelle FNP-C Unavailable         Unavailabl

e

 

                    Mc, Reginah W Michelle FNP-C Unavailable         Unavailabl

e

 

                    Mc, Reginah W Michelle FNP-C Unavailable         Unavailabl

e

 

                    Mc, Reginah W Michelle FNP-C Unavailable         Unavailabl

e

 

                    Mc, Reginah W Michelle FNP-C Unavailable         Unavailabl

e

 

                    Mc, Reginah W Michelle FNP-C Unavailable         Unavailabl

e

 

                    MENESS, A CORNELIO DO  Unavailable         Unavailable

 

                    MENESS, A CORNELIO DO  Unavailable         Unavailable

 

                    MENESS, A CORNELIO DO  Unavailable         Unavailable

 

                    MENESS, A CORNELIO DO  Unavailable         Unavailable

 

                    MENESS, A CORNELIO DO  Unavailable         Unavailable

 

                    MENESS, A CORNELIO DO  Unavailable         Unavailable

 

                    MENESS, A CORNELIO DO  Unavailable         Unavailable

 

                    MENESS, A CORNELIO DO  Unavailable         Unavailable

 

                    MENESS, A CORNELIO DO  Unavailable         Unavailable

 

                    MENESS, A CORNELIO DO  Unavailable         Unavailable

 

                    MENESS, A CORNELIO DO  Unavailable         Unavailable

 

                    MENESS, A CORNELIO DO  Unavailable         Unavailable

 

                    MENESS, A CORNELIO DO  Unavailable         Unavailable

 

                    MENESS, A CORNELIO DO  Unavailable         Unavailable

 

                    MENESS, A CORNELIO DO  Unavailable         Unavailable

 

                    MENESS, A CORNELIO DO  Unavailable         Unavailable

 

                    MENESS, A CORNELIO DO  Unavailable         Unavailable

 

                    MENESS, A CORNELIO DO  Unavailable         Unavailable

 

                    MENESS, A CORNELIO DO  Unavailable         Unavailable

 

                    MENESS, A CORNELIO DO  Unavailable         Unavailable

 

                    MENESS, A CORNELOI DO  Unavailable         Unavailable

 

                    MENESS, A CORNELIO DO  Unavailable         Unavailable

 

                    MENESS, A CORNELIO DO  Unavailable         Unavailable

 

                    MENESS, A CORNELIO DO  Unavailable         Unavailable

 

                    MENESS, A CORNELIO DO  Unavailable         Unavailable

 

                    MENESS, A CORNELIO DO  Unavailable         Unavailable

 

                    MENESS, A CORNELIO DO  Unavailable         Unavailable

 

                    MENESS, A CORNELIO DO  Unavailable         Unavailable

 

                    MENESS, A CORNELIO DO  Unavailable         Unavailable

 

                    TIM Gifford MD  Unavailable         Unavailable

 

                    TIM Gifford MD  Unavailable         Unavailable

 

                    GiffordTIM MD  Unavailable         Unavailable

 

                    GiffordTIM MD  Unavailable         Unavailable

 

                    GiffordTIM MD  Unavailable         Unavailable

 

                    TIM Gifford MD  Unavailable         Unavailable

 

                    GiffordTIM MD  Unavailable         Unavailable

 

                    GiffordTIM MD  Unavailable         Unavailable

 

                    GiffordTIM MD  Unavailable         Unavailable

 

                    GiffordTIM MD  Unavailable         Unavailable

 

                    GiffordTIM MD  Unavailable         Unavailable

 

                    GiffordTIM MD  Unavailable         Unavailable

 

                    GiffordTIM MD  Unavailable         Unavailable

 

                    GiffordTIM MD  Unavailable         Unavailable

 

                    GiffordTIM MD  Unavailable         Unavailable

 

                    GiffordTIM MD  Unavailable         Unavailable

 

                    GiffordTIM MD  Unavailable         Unavailable

 

                    GiffordTIM MD  Unavailable         Unavailable

 

                    GiffordTIM MD  Unavailable         Unavailable

 

                    GiffordTIM MD  Unavailable         Unavailable

 

                    GiffordTIM MD  Unavailable         Unavailable

 

                    GiffordTIM MD  Unavailable         Unavailable

 

                    GiffordTIM MD  Unavailable         Unavailable

 

                    GiffordTIM MD  Unavailable         Unavailable

 

                    GiffordTIM MD  Unavailable         Unavailable

 

                    GiffordTIM MD  Unavailable         Unavailable

 

                    GiffordTIM MD  Unavailable         Unavailable

 

                    GiffordTIM MD  Unavailable         Unavailable

 

                    TIM Gifford MD  Unavailable         Unavailable

 

                    TIM Gifford MD  Unavailable         Unavailable

 

                    TIM Gifford MD  Unavailable         Unavailable

 

                    TIM Gifford MD  Unavailable         Unavailable

 

                    TIM Gifford MD  Unavailable         Unavailable

 

                    TIM Gifford MD  Unavailable         Unavailable

 

                    TIM Gifford MD  Unavailable         Unavailable

 

                    TIM Gifford MD  Unavailable         Unavailable

 

                    TIM Gifford MD  Unavailable         Unavailable

 

                    TIM Gifford MD  Unavailable         Unavailable

 

                    TIM Gifford MD  Unavailable         Unavailable

 

                    TIM Gifford MD  Unavailable         Unavailable

 

                    TIM Gifford MD  Unavailable         Unavailable

 

                    TIM Gifford MD  Unavailable         Unavailable

 

                    TIM Gifford MD  Unavailable         Unavailable

 

                    TIM Gifford MD  Unavailable         Unavailable

 

                    TIM Gifford MD  Unavailable         Unavailable

 

                    TIM Gifford MD  Unavailable         Unavailable

 

                    TIM Gifford MD  Unavailable         Unavailable

 

                    TIM Gifford MD  Unavailable         Unavailable

 

                    TIM Gifford MD  Unavailable         Unavailable

 

                    TIM Gifford MD  Unavailable         Unavailable

 

                    TIM Gifford MD  Unavailable         Unavailable

 

                    TIM Gifford MD  Unavailable         Unavailable

 

                    TIM Gifford MD  Unavailable         Unavailable

 

                    TIM Gifford MD  Unavailable         Unavailable

 

                    TIM Gifford MD  Unavailable         Unavailable

 

                    TIM Gifford MD  Unavailable         Unavailable

 

                    TIM Gifford MD  Unavailable         Unavailable

 

                    TIM Gifford MD  Unavailable         Unavailable

 

                    ANTECOLKIAN MD Unavailable         Unavailable

 

                    ANTECOL, KIAN VELASQUEZ MD Unavailable         Unavailable

 

                    ANTECOL, KIAN VELASQUEZ MD Unavailable         Unavailable

 

                    ANTECOL, KIAN VELASQUEZ MD Unavailable         Unavailable

 

                    ANTECOL, KIAN VELASQUEZ MD Unavailable         Unavailable

 

                    ANTECOL, KIAN VELASQUEZ MD Unavailable         Unavailable

 

                    ANTECOL, KIAN VELASQUEZ MD Unavailable         Unavailable

 

                    ANTECOL, KIAN VELASQUEZ MD Unavailable         Unavailable

 

                    ANTECOL, KIAN VELASQUEZ MD Unavailable         Unavailable

 

                    ANTECOL, KIAN VELASQUEZ MD Unavailable         Unavailable

 

                    ANTECOL, KIAN VELASQUEZ MD Unavailable         Unavailable

 

                    ANTECOL, KIAN VELASQUEZ MD Unavailable         Unavailable

 

                    ANTECOL, KIAN VELASQUEZ MD Unavailable         Unavailable

 

                    ANTECOL, KIAN VELASQUEZ MD Unavailable         Unavailable

 

                    ANTECOL, KIAN VELASQUEZ MD Unavailable         Unavailable

 

                    ANTECOL, KIAN VELASQUEZ MD Unavailable         Unavailable

 

                    ANTECOL, KIAN VELASQUEZ MD Unavailable         Unavailable

 

                    ANTECOL, KIAN VELASQUEZ MD Unavailable         Unavailable

 

                    ANTECOL, KIAN VELASQUEZ MD Unavailable         Unavailable

 

                    ANTECOL, KIAN VELASQUEZ MD Unavailable         Unavailable

 

                    ANTECOL, KIAN VELASQUEZ MD Unavailable         Unavailable

 

                    ANTECOL, KIAN VELASQUEZ MD Unavailable         Unavailable

 

                    ANTECOL, KIAN VELASQUEZ MD Unavailable         Unavailable

 

                    ANTECOL, KIAN VELASQUEZ MD Unavailable         Unavailable

 

                    ANTECOL, KIAN VELASQUEZ MD Unavailable         Unavailable

 

                    ANTECOL, KIAN VELASQUEZ MD Unavailable         Unavailable

 

                    ANTECOL, KIAN VELASQUEZ MD Unavailable         Unavailable

 

                    ANTECOL, KIAN VELASQUEZ MD Unavailable         Unavailable

 

                    ANTECOL, KIAN VELASQUEZ MD Unavailable         Unavailable

 

                    ANTECOL, KIAN VELASQUEZ MD Unavailable         Unavailable

 

                    ANTECOL, KIAN VELASQUEZ MD Unavailable         Unavailable

 

                    ANTECOL, KIAN VELASQUEZ MD Unavailable         Unavailable

 

                    ANTECOL, KIAN VELASQUEZ MD Unavailable         Unavailable

 

                    ANTECOL, KIAN VELASQUEZ MD Unavailable         Unavailable

 

                    ANTECOL, KIAN VELASQUEZ MD Unavailable         Unavailable

 

                    ANTECOL, KIAN VELASQUEZ MD Unavailable         Unavailable

 

                    ANTECOL, KIAN VELASQUEZ MD Unavailable         Unavailable

 

                    ANTECOL, KIAN VELASQUEZ MD Unavailable         Unavailable

 

                    ANTECOL, KIAN VELASQUEZ MD Unavailable         Unavailable

 

                    ANTECOL, KIAN VELASQUEZ MD Unavailable         Unavailable

 

                    ANTECOL, KIAN VELASQUEZ MD Unavailable         Unavailable

 

                    ANTECOL, KIAN VELASQUEZ MD Unavailable         Unavailable

 

                    ANTECOL, KIAN VELASQUEZ MD Unavailable         Unavailable

 

                    ANTECOL, KIAN VELASQUEZ MD Unavailable         Unavailable

 

                    ANTECOL, KIAN VELASQUEZ MD Unavailable         Unavailable

 

                    ANTECOL, KIAN VELASQUEZ MD Unavailable         Unavailable

 

                    ANTECOL, KIAN VELASQUEZ MD Unavailable         Unavailable

 

                    ANTECOL, KIAN VELASQUEZ MD Unavailable         Unavailable

 

                    ANTECOL, KIAN VELASQUEZ MD Unavailable         Unavailable

 

                    ANTECOL, KIAN VELASQUEZ MD Unavailable         Unavailable

 

                    ANTECOL, KIAN VELASQUEZ MD Unavailable         Unavailable

 

                    ANTECOL, KIAN VELASQUEZ MD Unavailable         Unavailable

 

                    ANTECOL, KIAN VELASQUEZ MD Unavailable         Unavailable

 

                    ANTECOL, KIAN VELASQUEZ MD Unavailable         Unavailable

 

                    DHAVAL, L FREIDA PA Unavailable         Unavailable

 

                    DHAVAL, L FREIDA PA Unavailable         Unavailable

 

                    DHAVAL, L FREIDA PA Unavailable         Unavailable

 

                    DHAVAL, L FREIDA PA Unavailable         Unavailable

 

                    DHAVAL, L FREIDA PA Unavailable         Unavailable

 

                    DHAVAL, L FREIDA PA Unavailable         Unavailable

 

                    DHAVAL, L FREIDA PA Unavailable         Unavailable

 

                    DHAVAL, L FREIDA PA Unavailable         Unavailable

 

                    DHAVAL, L FREIDA PA Unavailable         Unavailable

 

                    DHAVAL, L FREIDA PA Unavailable         Unavailable

 

                    DHAVAL, L FREIDA PA Unavailable         Unavailable

 

                    DHAVAL, L FREIDA PA Unavailable         Unavailable

 

                    DHAVAL, L FREIDA PA Unavailable         Unavailable

 

                    DHAVAL, L FREIDA PA Unavailable         Unavailable

 

                    DHAVAL, L FREIDA PA Unavailable         Unavailable

 

                    DHAVAL, L FREIDA PA Unavailable         Unavailable

 

                    DHAVAL, L FREIDA PA Unavailable         Unavailable

 

                    DHAVAL, L FREIDA PA Unavailable         Unavailable

 

                    DHAVAL, L FREIDA PA Unavailable         Unavailable

 

                    DHAVAL, L FREIDA PA Unavailable         Unavailable

 

                    DHAVAL, L FREIDA PA Unavailable         Unavailable

 

                    DHAVAL, L FREIDA PA Unavailable         Unavailable

 

                    BILLYNATALIE MD Unavailable         Unavailable

 

                    BILLYNATALIE MD Unavailable         Unavailable

 

                    BILLY, NATALIE CROFT MD Unavailable         Unavailable

 

                    BILLYNATALIE MD Unavailable         Unavailable

 

                    BILLYNATALIE MD Unavailable         Unavailable

 

                    BILLYNATALIE MD Unavailable         Unavailable

 

                    BILLY, NATALIE CROFT MD Unavailable         Unavailable

 

                    BILLYNATALIE MD Unavailable         Unavailable

 

                    BILLYNATALIE MD Unavailable         Unavailable

 

                    BILLYNATALIE MD Unavailable         Unavailable

 

                    BILLYNATALIE MD Unavailable         Unavailable

 

                    BILLYNATALIE MD Unavailable         Unavailable

 

                    BILLYNATALIE MD Unavailable         Unavailable

 

                    BILLYNATALIE MD Unavailable         Unavailable

 

                    BILLYNATALIE MD Unavailable         Unavailable

 

                    BILLYNATALIE MD Unavailable         Unavailable

 

                    BILLYNATALIE MD Unavailable         Unavailable

 

                    BILLYNATALIE MD Unavailable         Unavailable

 

                    BILLYNATALIE MD Unavailable         Unavailable

 

                    BILLYNATALIE MD Unavailable         Unavailable

 

                    BILLYNATALIE MD Unavailable         Unavailable

 

                    BILLYNATALIE MD Unavailable         Unavailable

 

                    BILLYNATALIE MD Unavailable         Unavailable

 

                    BILLYNATALIE MD Unavailable         Unavailable

 

                    BILLYNATALIE MD Unavailable         Unavailable

 

                    BILLYNATALIE MD Unavailable         Unavailable

 

                    BILLYNATALIE MD Unavailable         Unavailable

 

                    BILLYNATALIE MD Unavailable         Unavailable

 

                    BILLYNATALIE MD Unavailable         Unavailable

 

                    BILLYNATALIE MD Unavailable         Unavailable

 

                    BILLYNATALIE MD Unavailable         Unavailable

 

                    BILLYNATALIE MD Unavailable         Unavailable

 

                    BILLYNATALIE MD Unavailable         Unavailable

 

                    BILLYNATALIE MD Unavailable         Unavailable

 

                    BILLYNATALIE MD Unavailable         Unavailable

 

                    IBLLYNATALIE MD Unavailable         Unavailable

 

                    BILLYNATALIE MD Unavailable         Unavailable

 

                    BILLYNATALIE MD Unavailable         Unavailable

 

                    BILLYNATALIE MD Unavailable         Unavailable

 

                    BILLYNATALIE MD Unavailable         Unavailable

 

                    BILLYNATALIE MD Unavailable         Unavailable

 

                    BILLY, P GUERDA MD Unavailable         Unavailable

 

                    BILLY, P GUERDA BAILEY Unavailable         Unavailable

 

                    BILLY, NATALIE CROFT MD Unavailable         Unavailable

 

                    BILLY, P GUERDA MD Unavailable         Unavailable

 

                    BILLY, P GUERDA MD Unavailable         Unavailable

 

                    BILLY, P GUERDA MD Unavailable         Unavailable

 

                    BILLY, P GUERDA MD Unavailable         Unavailable

 

                    BILLY, P GUERDA MD Unavailable         Unavailable

 

                    BILLY, NATALIE CROFT MD Unavailable         Unavailable

 

                    BILLY, P GUERDA MD Unavailable         Unavailable

 

                    BILLY, P GUERDA MD Unavailable         Unavailable

 

                    BILLY, P GUERDA MD Unavailable         Unavailable

 

                    BILLY, P GUERDA MD Unavailable         Unavailable

 

                    BILLY, P GUERDA MD Unavailable         Unavailable

 

                    BILLY, NATALIE CROFT MD Unavailable         Unavailable

 

                    BILLY, P GUERDA MD Unavailable         Unavailable

 

                    BILLY, NATALIE CROFT MD Unavailable         Unavailable

 

                    BILLY, NATALIE CROFT MD Unavailable         Unavailable

 

                    BILLY, NATALIE CROFT MD Unavailable         Unavailable

 

                    BILLY, NATALIE CROFT MD Unavailable         Unavailable

 

                    BILLY, NATALIE CROFT MD Unavailable         Unavailable

 

                    BILLY, NATALIE CROFT MD Unavailable         Unavailable

 

                    BILLY, NATALIE CROFT MD Unavailable         Unavailable

 

                    BILLY, NATALIE CROFT MD Unavailable         Unavailable

 

                    BILLY, NATALIE CROFT MD Unavailable         Unavailable

 

                    BILLY, NATALIE CROFT MD Unavailable         Unavailable

 

                    BILLY, NATALIE CROFT MD Unavailable         Unavailable

 

                    BILLY, NATALIE CROFT MD Unavailable         Unavailable

 

                    BILLY, NATALIE CROFT MD Unavailable         Unavailable

 

                    BILLY, NATALIE CROFT MD Unavailable         Unavailable

 

                    BILLY, NATALIE CROFT MD Unavailable         Unavailable

 

                    BILLY, NATALIE CROFT MD Unavailable         Unavailable

 

                    BILLY, NATALIE CROFT MD Unavailable         Unavailable

 

                    BILLY, NATALIE CROFT MD Unavailable         Unavailable

 

                    BILLY, NATALIE CROFT MD Unavailable         Unavailable

 

                    BILLY, NATALIE CROFT MD Unavailable         Unavailable

 

                    BILLY, NATALIE CROFT MD Unavailable         Unavailable

 

                    BILLY, NATALIE CROFT MD Unavailable         Unavailable

 

                    BILLY, NATALIE CROFT MD Unavailable         Unavailable

 

                    BILLY, NATALIE CROFT MD Unavailable         Unavailable

 

                    BILLY, NATALIE CROFT MD Unavailable         Unavailable

 

                    BILLY, NATALIE CROFT MD Unavailable         Unavailable

 

                    BILLY, NATALIE CROFT MD Unavailable         Unavailable

 

                    BILLY, NATALIE CROFT MD Unavailable         Unavailable

 

                    BILLY, NATALIE CROFT MD Unavailable         Unavailable

 

                    BILLY, NATALIE CROFT MD Unavailable         Unavailable

 

                    BILLY, NATALIE CROFT MD Unavailable         Unavailable

 

                    BILLY, NATALIE CROFT MD Unavailable         Unavailable

 

                    BILLY, NATALIE CROFT MD Unavailable         Unavailable

 

                    BILLY, NATALIE CROFT MD Unavailable         Unavailable

 

                    BILLY, NATALIE CROFT MD Unavailable         Unavailable

 

                    BILLY, NATALIE CROFT MD Unavailable         Unavailable

 

                    BILLY, NATALIE CROFT MD Unavailable         Unavailable

 

                    Coleman, A Umair FNP Unavailable         Unavailable

 

                    Coleman, A Umair FNP Unavailable         Unavailable

 

                    Coleman, A Umair FNP Unavailable         Unavailable

 

                    Coleman, A Umair FNP Unavailable         Unavailable

 

                    Coleman, A Umair FNP Unavailable         Unavailable

 

                    Coleman, A Umair FNP Unavailable         Unavailable

 

                    Coleman, A Umair FNP Unavailable         Unavailable

 

                    Coleman, A Umair FNP Unavailable         Unavailable

 

                    Coleman, A Umair FNP Unavailable         Unavailable

 

                    Coleman, A Umair FNP Unavailable         Unavailable

 

                    Coleman, A Umair FNP Unavailable         Unavailable

 

                    Coleman, A Umair FNP Unavailable         Unavailable

 

                    Coleman, A Umair FNP Unavailable         Unavailable

 

                    Coleman, A Umair FNP Unavailable         Unavailable

 

                    Coleman, A Umair FNP Unavailable         Unavailable

 

                    Coleman, A Umair FNP Unavailable         Unavailable

 

                    Coleman, A Umair FNP Unavailable         Unavailable

 

                    Coleman, A Umair FNP Unavailable         Unavailable

 

                    Coleman, A Umair FNP Unavailable         Unavailable

 

                    Coleman, A Umair FNP Unavailable         Unavailable

 

                    Coleman, A Umair FNP Unavailable         Unavailable

 

                    Coleman, A Umair FNP Unavailable         Unavailable

 

                    Coleman, A Umair FNP Unavailable         Unavailable

 

                    Coleman, A Umair FNP Unavailable         Unavailable

 

                    Coleman, A Umair FNP Unavailable         Unavailable

 

                    Coleman, A Umair FNP Unavailable         Unavailable

 

                    Coleman, A Umair FNP Unavailable         Unavailable

 

                    Coleman, A Umair FNP Unavailable         Unavailable

 

                    Coleman, A Umair FNP Unavailable         Unavailable

 

                    Coleman, A Umair FNP Unavailable         Unavailable

 

                    Coleman, A Umair FNP Unavailable         Unavailable

 

                    Coleman, A Umair FNP Unavailable         Unavailable

 

                    Coleman, A Umair FNP Unavailable         Unavailable

 

                    Coleman, A Umair FNP Unavailable         Unavailable

 

                    Coleman, A Umair FNP Unavailable         Unavailable

 

                    Coleman, A Umair FNP Unavailable         Unavailable

 

                    Coleman, A Umair FNP Unavailable         Unavailable

 

                    Coleman, A Umair FNP Unavailable         Unavailable

 

                    Coleman, A Umair FNP Unavailable         Unavailable

 

                    Coleman, A Umair FNP Unavailable         Unavailable

 

                    Coleman, A Umair FNP Unavailable         Unavailable

 

                    Coleman, A Umair FNP Unavailable         Unavailable

 

                    Coleman, A Umair FNP Unavailable         Unavailable

 

                    Coleman, A Umair FNP Unavailable         Unavailable

 

                    Coleman, A Umair FNP Unavailable         Unavailable

 

                    Coleman, A Umair FNP Unavailable         Unavailable

 

                    Coleman, A Umair FNP Unavailable         Unavailable

 

                    Coleman, A Umair FNP Unavailable         Unavailable

 

                    Coleman, A Umair FNP Unavailable         Unavailable

 

                    Coleman, A Umair FNP Unavailable         Unavailable

 

                    Coleman, A Umair FNP Unavailable         Unavailable

 

                    Coleman, A Umair FNP Unavailable         Unavailable



                                  



Re-disclosure Warning

          The records that you are about to access may contain information from 
federally-assisted alcohol or drug abuse programs. If such information is 
present, then the following federally mandated warning applies: This information
has been disclosed to you from records protected by federal confidentiality 
rules (42 CFR part 2). The federal rules prohibit you from making any further 
disclosure of this information unless further disclosure is expressly permitted 
by the written consent of the person to whom it pertains or as otherwise 
permitted by 42 CFR part 2. A general authorization for the release of medical 
or other information is NOT sufficient for this purpose. The Federal rules 
restrict any use of the information to criminally investigate or prosecute any 
alcohol or drug abuse patient.The records that you are about to access may 
contain highly sensitive health information, the redisclosure of which is 
protected by Article 27-F of the [facility] Public Health law. If you 
continue you may have access to information: Regarding HIV / AIDS; Provided by 
facilities licensed or operated by the [facility] Office of Mental Health; 
or Provided by the [facility] Office for People With Developmental 
Disabilities. If such information is present, then the following [place] 
mandated warning applies: This information has been disclosed to you from 
confidential records which are protected by state law. State law prohibits you 
from making any further disclosure of this information without the specific 
written consent of the person to whom it pertains, or as otherwise permitted by 
law. Any unauthorized further disclosure in violation of state law may result in
a fine or half-way sentence or both. A general authorization for the release of 
medical or other information is NOT sufficient authorization for further disc
losure.                                                                         
    



Allergies and Adverse Reactions

          



           Type       Description Substance  Reaction   Status     Data Source(s

)

 

                      Adhesive Tape Adhesive Tape RASH, RAW, VERY SORE [facility]

 

           Drug allergy latex      Latex                            [facility]

 

           Drug allergy atorvastatin atorvastatin LEG CRAMPS BAD MO             

[facility]

 

           Drug allergy buspirone  buspirone  DIARRHEA MI             [facility]

 

           Drug allergy amoxicillin Amoxicillin Diarrhea [facility]

 

           Drug allergy tetracycline tetracycline Diarrhea [facility]

 

           Drug allergy codeine    Codeine    ITCHING AND NAUSEA [facility]

 

           Drug allergy nitroglycerin nitroglycerin MIGRAINE HEADACHES [facility]



                                                                                
                                                                             



Family History

          



             Family Member Name Family Member Gender Family Member Status Date o

f Status 

Description                             Data Source(s)

 

           Unknown    Unknown    Problem                          [facility] ([facility], )



                                                                                
       



Encounters

          



           Encounter  Providers  Location   Date       Indications Data Source(s

)

 

                Unknown                         [address] [place] [place]

[place] [address] 10/19/2021 12:00:00 AM 

EDT                                                 eCW1 ([facility])

 

           Outpatient Attender: RON VILLALBA MD Main Office 2021 12:15:00

 PM EDT            

[facility] ([facility])

 

           Outpatient            [facility] 2021 12:00:00 

AM EDT            eCW1 ([facility])

 

                Unknown                         [address] [place]

[place] [address] 2021 12:00:00 AM 

EDT                                                 eCW1 ([facility])

 

                Unknown                         [address] [facility] [address]

[place] [address] 2021 12:00:00 AM 

EDT                                                 eCW1 ([facility])

 

                Outpatient                      [address] [facility] [address]

[place] [address] 2021 12:00:00 AM

EDT                                                 eCW1 ([facility])

 

                Unknown                         [address] [facility] [address]

[place] [address] 2021 12:00:00 AM 

EDT                                                 eCW1 ([facility])

 

             Office Visit Attender: RON VILLALBA MD Main Office  2021 08:

27:00 AM EDT  

                                        MEDENT ([facility])

 

                Outpatient                      [address] [facility]

[place] [address] 2021 12:00:00 AM

EDT                                                 eCW1 ([facility])

 

           Outpatient Attender: RON VILLALBA MD Main Office 07/15/2021 11:00:00

 AM EDT            

MEDGHASSAN ([facility])

 

           Outpatient Attender: RON VILLALBA MD Main Office 06/15/2021 12:00:00

 PM EDT            

MEDENT ([facility])

 

                Unknown                         [address] [facility]

[place] [address] 2021 12:00:00 AM 

EDT                                                 eCW1 ([facility])

 

           Outpatient            [facility] 2021 12:00:00 

AM EDT            eCW1 ([facility])

 

           Outpatient            [facility] 2021 12:00:00 

AM EDT            eCW1 ([facility])

 

                Emergency       Attender: Joselito Vicente DO                 

021 12:54:00 PM EDT - 2021 

03:40:00 PM EDT           L ELBOW/WRIST PAIN        [facility]

l

 

                                        L ELBOW/WRIST PAIN 

 

                                        Patient discharged. 

 

                Outpatient      Attender: Halle Tapia/Daniel/Ronnie/Reindl

 2021 02:00:00 

PM EDT                                              MEDENT ([facility]

 

           Outpatient            [facility] 2021 12:00:00 

AM EDT            eCW1 ([facility])

 

           Outpatient            [facility] 2021 12:00:00 

AM EDT            eCW1 ([facility])

 

           Outpatient            [facility] 2021 12:00:00 

AM EDT            eCW1 ([facility])

 

                    Emergency           Attender: FREIDA oMore: Cinda Martinez PA-C EMERGENCY 

ROOM-ER             2021 03:40:00 PM EDT - 2021 07:32:00 PM EDT     

                [facility]

 

                                        Patient discharged. 

 

           Outpatient Attender: Umair Martinez PA-C            2021 12:55

:00 PM EDT            [facility]

 

           Outpatient            [facility] 2021 12:00:00 

AM EDT            eCW1 ([facility])

 

           Outpatient            [facility] 2021 12:00:00 

AM EDT            eCW1 ([facility])

 

           Outpatient            [facility] 2021 12:00:00 

AM EDT            eCW1 ([facility])

 

           Outpatient            [facility] 2021 12:00:00 

AM EST            eCW1 ([facility])

 

           Outpatient            [facility] 2021 12:00:00 

AM EST            eCW1 ([facility])

 

           Outpatient            [facility] 2021 12:00:00 

AM EST            eCW1 ([facility])

 

           Outpatient            [facility] 2021 12:00:00 

AM EST            eCW1 ([facility])

 

           Outpatient            [facility] 2021 12:00:00 

AM EST            eCW1 ([facility])

 

           Outpatient            [facility] 2021 12:00:00 

AM EST            eCW1 ([facility])

 

           Outpatient            [facility] 2020 12:00:00 

AM EST            eCW1 ([facility])

 

           Outpatient            [facility] 2020 12:00:00 

AM EST            eCW1 ([facility])

 

           Outpatient Attender: Umair Martinez PA-C            2020 01:00

:00 PM EST            [facility]

 

           Outpatient            [facility] 2020 12:00:00 

AM EST            eCW1 ([facility])

 

           Outpatient            [facility] 2020 12:00:00 

AM EST            eCW1 ([facility])

 

           Outpatient            [facility] 2020 12:00:00 

AM EST            eCW1 ([facility])

 

           Outpatient            [facility] 2020 12:00:00 

AM EST            eCW1 ([facility])

 

           Outpatient            [facility] 2020 12:00:00 

AM EST            eCW1 ([facility])

 

           Outpatient            [facility] 2020 12:00:00 

AM EST            eCW1 ([facility])

 

           Outpatient            [facility] 2020 12:00:00 

AM EST            eCW1 ([facility])

 

           Outpatient            [facility] 10/26/2020 12:00:00 

AM EDT            eCW1 ([facility])

 

           Outpatient Attender: Umair Martinez PA-C            10/14/2020 10:30

:00 AM EDT            [facility]

 

           Outpatient            [facility] 10/14/2020 12:00:00 

AM EDT            eCW1 ([facility])

 

           Outpatient            [facility] 10/08/2020 12:00:00 

AM EDT            eCW1 ([facility])

 

                [facility]

ENTER 10/02/2020 12:00:00 AM 

EDT                                                 eCW1 ([facility])

 

           Outpatient            [facility] 10/01/2020 12:00:00 

AM EDT            eCW1 ([facility])

 

           Outpatient Attender: RON BLOCK SR            2020 11:20:00 

AM EDT            [facility]

 

           Outpatient            [facility] 2020 12:00:00 

AM EDT            eCW1 ([facility])

 

           Outpatient            [facility] 2020 12:00:00 

AM EDT            eCW1 ([facility])

 

           Outpatient Attender: RON BLOCK SR            2020 01:37:00 

PM EDT            [facility]

 

           Outpatient            [facility] 2020 12:00:00 

AM EDT            eCW1 ([facility])

 

           Outpatient Attender: RON BLOCK SR            2020 10:30:00 

AM EDT            [facility]

 

           Outpatient            [facility] 2020 12:00:00 

AM EDT            eCW1 ([facility])

 

           Outpatient            [facility] 2020 12:00:00 

AM EDT            eCW1 ([facility])

 

           Outpatient            [facility] 2020 12:00:00 

AM EDT            eCW1 ([facility])

 

           Outpatient Attender: Umair ROSAS            2020 11:30

:00 AM EDT            [facility]

 

           Outpatient Attender: Michelle WELLSC            2020 11:00:0

0 AM EDT            [facility]

 

           Outpatient            [facility] 2020 12:00:00 

AM EDT            eCW1 ([facility])

 

           Outpatient            [facility] 2020 12:00:00 

AM EDT            eCW1 ([facility]

 

           Outpatient            [facility] 2020 12:00:00 

AM EDT            eCW1 ([facility])

 

           Outpatient            [facility] 2020 12:00:00 

AM EDT            eCW1 ([facility])

 

                    Outpatient          Attender: RON BLOCK SRReferrer: PEBBLES BLOCK SR EMERGENCY 

ROOM-RIVCLI         2020 11:30:00 AM EDT - 2020 11:30:00 AM [facility]

 

           Outpatient            [facility] 2020 12:00:00 

AM EDT            eCW1 ([facility])

 

           Outpatient Attender: RON BLOCK SR            2020 10:30:00 

AM [facility]

 

           Outpatient Attender: Bina MARTINEZ            2020 11:50:00

 AM [facility]

 

                    Outpatient          Attender: RON BLOCK SRReferrer: PEBBLES BLOCK SR EMERGENCY 

ROOM-LAB            2020 08:48:00 AM EDT - 2020 08:48:00 AM [facility]

 

           Outpatient Attender: RON BLOCK SR            2020 10:30:00 

AM [facility]

 

           Outpatient Attender: Umair Martinez PA-C            2020 10:00

:00 AM [facility]

 

           Outpatient Attender: RON BLOCK SR            2020 02:00:00 

PM [facility]

 

                    Outpatient          Attender: Teresa Gifford MDReferrer: RON BLOCK SR EMERGENCY 

ROOM-LABOTHPROV     2020 01:30:00 PM EST - 2020 01:30:00 PM [facility]

 

           Outpatient Attender: RON BLOCK SR            2020 01:10:00 

PM [facility]

 

           Outpatient Attender: RON BLOCK SR            2019 12:04:00 

PM [facility]

 

                Outpatient      Attender: RON BLOCK SRReferrer: RON ROE SR                 2019 

02:10:00 PM EST - 2019 02:10:00 PM [facility]

 

                Outpatient      Attender: RON BLOCK SRReferrer: RON ROE SR                 2019 

01:00:00 PM [facility]

 

                Outpatient      Attender: GUERDA CERVANTES MDReferrer: RON ROE SR                 2018 

12:02:00 PM EDT - 2018 12:02:00 PM EDT                           [facility]

 

                Outpatient      Attender: RON BLOCK SRReferrer: RON ROE SR                 2017 

08:00:00 AM EST                                     [facility]

 

                Emergency       Attender: CL PARTIDA PAReferrer: RON KATZ SR                 2017 

04:27:00 PM EDT - 2017 05:01:00 PM EDT                           [facility]

 

                Outpatient      Attender: CORNELIO LEWISeferrer: Michelle RICHEY                 2017 

09:14:00 AM EST                                     [facility]



                                                                                
                                                                                
                                                                                
                                                                                
                                                                                
                                                                                
                                                                                
                                                                                
                                                                                
                                                                                
                                                



Immunizations

          



             Vaccine      Date         Status       Description  Data Source(s)

 

                          2020 02:09:00 PM EST completed                 e

CW1 ([facility])

 

                          2020 02:09:00 PM EST completed                 e

CW1 ([facility])

 

                          2020 02:09:00 PM EST completed                 e

CW1 ([facility])

 

                          2020 02:09:00 PM EST completed                 e

CW1 ([facility])

 

                          2020 02:09:00 PM EST completed                 e

CW1 ([facility])

 

                          2020 02:09:00 PM EST completed                 e

CW1 ([facility])

 

                          2020 02:09:00 PM EST completed                 e

CW1 ([facility])

 

                          2020 02:09:00 PM EST completed                 e

CW1 ([facility])

 

                          2020 02:09:00 PM EST completed                 e

CW1 ([facility])

 

                          2020 02:09:00 PM EST completed                 e

CW1 ([facility])

 

                          2020 02:09:00 PM EST completed                 e

CW1 ([facility])

 

                          2020 02:09:00 PM EST completed                 e

CW1 ([facility])

 

                          2020 02:09:00 PM EST completed                 e

CW1 ([facility])

 

                          2020 02:09:00 PM EST completed                 e

CW1 ([facility])

 

                          2020 02:09:00 PM EST completed                 e

CW1 ([facility]

 

                          2020 02:09:00 PM EST completed                 e

CW1 ([facility])

 

                          2020 02:09:00 PM EST completed                 e

CW1 ([facility])

 

                          2020 02:09:00 PM EST completed                 e

CW1 ([facility])

 

                          10/14/2020 11:12:00 AM EDT completed                 e

CW1 ([facility])

 

                          10/14/2020 11:12:00 AM EDT completed                 e

CW1 ([facility])

 

                          10/14/2020 11:12:00 AM EDT completed                 e

CW1 ([facility])

 

                          10/14/2020 11:12:00 AM EDT completed                 e

CW1 ([facility])

 

                          10/14/2020 11:12:00 AM EDT completed                 e

CW1 ([facility])

 

                          10/14/2020 11:12:00 AM EDT completed                 e

CW1 ([facility])

 

                          10/14/2020 11:12:00 AM EDT completed                 e

CW1 ([facility])

 

                          10/14/2020 11:12:00 AM EDT completed                 e

CW1 ([facility])

 

                          10/14/2020 11:12:00 AM EDT completed                 e

CW1 ([facility])

 

                          10/14/2020 11:12:00 AM EDT completed                 e

CW1 ([facility])

 

                          10/14/2020 11:12:00 AM EDT completed                 e

CW1 ([facility])

 

                          10/14/2020 11:12:00 AM EDT completed                 e

CW1 ([facility])

 

                          10/14/2020 11:12:00 AM EDT completed                 e

CW1 ([facility])

 

                          10/14/2020 11:12:00 AM EDT completed                 e

CW1 ([facility])

 

                          10/14/2020 11:12:00 AM EDT completed                 e

CW1 ([facility])

 

                          10/14/2020 11:12:00 AM EDT completed                 e

CW1 ([facility])

 

                          10/14/2020 11:12:00 AM EDT completed                 e

CW1 ([facility])

 

                          10/14/2020 11:12:00 AM EDT completed                 e

CW1 ([facility]

 

                          10/14/2020 11:12:00 AM EDT completed                 e

CW1 ([facility])

 

                          10/14/2020 11:12:00 AM EDT completed                 e

CW1 ([facility]

 

                          10/14/2020 11:12:00 AM EDT completed                 e

CW1 ([facility]

 

                          10/14/2020 11:12:00 AM EDT completed                 e

CW1 ([facility])

 

                          10/14/2020 11:12:00 AM EDT completed                 e

CW1 ([facility])

 

                          10/14/2020 11:12:00 AM EDT completed                 e

CW1 ([facility]

 

                          10/14/2020 11:12:00 AM EDT completed                 e

CW1 ([facility])

 

                          10/14/2020 11:12:00 AM EDT completed                 e

CW1 ([facility]

 

                          10/14/2020 11:12:00 AM EDT completed                 e

CW1 ([facility])

 

             New in .  IIV4 10/14/2020 11:11:00 AM EDT completed            

     eCW1 ([facility]

 

             New in .  IIV4 10/14/2020 11:11:00 AM EDT completed            

     eCW1 ([facility]

 

             New in .  IIV4 10/14/2020 11:11:00 AM EDT completed            

     eCW1 ([facility]

 

             New in .  IIV4 10/14/2020 11:11:00 AM EDT completed            

     eCW1 ([facility]

 

             New in .  IIV4 10/14/2020 11:11:00 AM EDT completed            

     eCW1 ([facility]

 

             New in .  IIV4 10/14/2020 11:11:00 AM EDT completed            

     eCW1 ([facility]

 

             New in .  IIV4 10/14/2020 11:11:00 AM EDT completed            

     eCW1 ([facility]

 

             New in .  IIV4 10/14/2020 11:11:00 AM EDT completed            

     eCW1 ([facility]

 

             New in .  IIV4 10/14/2020 11:11:00 AM EDT completed            

     eCW1 ([facility]

 

             New in .  IIV4 10/14/2020 11:11:00 AM EDT completed            

     eCW1 ([facility]

 

             New in .  IIV4 10/14/2020 11:11:00 AM EDT completed            

     eCW1 ([facility]

 

             New in .  IIV4 10/14/2020 11:11:00 AM EDT completed            

     eCW1 ([facility]

 

             New in .  IIV4 10/14/2020 11:11:00 AM EDT completed            

     eCW1 ([facility]

 

             New in .  IIV4 10/14/2020 11:11:00 AM EDT completed            

     eCW1 ([facility]

 

             New in .  IIV4 10/14/2020 11:11:00 AM EDT completed            

     eCW1 ([facility]

 

             New in .  IIV4 10/14/2020 11:11:00 AM EDT completed            

     eCW1 ([facility]

 

             New in .  IIV4 10/14/2020 11:11:00 AM EDT completed            

     eCW1 ([facility]

 

             New in .  IIV4 10/14/2020 11:11:00 AM EDT completed            

     eCW1 ([facility]

 

             New in .  IIV4 10/14/2020 11:11:00 AM EDT completed            

     eCW1 ([facility]

 

             New in .  IIV4 10/14/2020 11:11:00 AM EDT completed            

     eCW1 ([facility]

 

             New in .  IIV4 10/14/2020 11:11:00 AM EDT completed            

     eCW1 ([facility]

 

             New in .  IIV4 10/14/2020 11:11:00 AM EDT completed            

     eCW1 ([facility]

 

             New in .  IIV4 10/14/2020 11:11:00 AM EDT completed            

     eCW1 ([facility]

 

             New in .  IIV4 10/14/2020 11:11:00 AM EDT completed            

     eCW1 ([facility]

 

             New in .  IIV4 10/14/2020 11:11:00 AM EDT completed            

     eCW1 ([facility]

 

             New in .  IIV4 10/14/2020 11:11:00 AM EDT completed            

     eCW1 ([facility]

 

             New in .  IIV4 10/14/2020 11:11:00 AM EDT completed            

     eCW1 ([facility]

 

                          2020 10:12:00 AM EDT completed                 e

CW1 ([facility])

 

                          2020 10:12:00 AM EDT completed                 e

CW1 ([facility])

 

                          2020 10:12:00 AM EDT completed                 e

CW1 ([facility]

 

                          2020 10:12:00 AM EDT completed                 e

CW1 ([facility])

 

                          2020 10:12:00 AM EDT completed                 e

CW1 ([facility]

 

                          2020 10:12:00 AM EDT completed                 e

CW1 ([facility]

 

                          2020 10:12:00 AM EDT completed                 e

CW1 ([facility]

 

                          2020 10:12:00 AM EDT completed                 e

CW1 ([facility]

 

                          2020 10:12:00 AM EDT completed                 e

CW1 ([facility])

 

                          2020 10:12:00 AM EDT completed                 e

CW1 ([facility] 

[facility])

 

                          2020 10:12:00 AM EDT completed                 e

CW1 ([facility] 

[facility])

 

                          2020 10:12:00 AM EDT completed                 e

CW1 ([facility])

 

                          2020 10:12:00 AM EDT completed                 e

CW1 ([facility])

 

                          2020 10:12:00 AM EDT completed                 e

CW1 ([facility])

 

                          2020 10:12:00 AM EDT completed                 e

CW1 ([facility])

 

                          2020 10:12:00 AM EDT completed                 e

CW1 ([facility])

 

                          2020 10:12:00 AM EDT completed                 e

CW1 ([facility])

 

                          2020 10:12:00 AM EDT completed                 e

CW1 ([facility])

 

                          2020 10:12:00 AM EDT completed                 e

CW1 ([facility]

 

                          2020 10:12:00 AM EDT completed                 e

CW1 ([facility])

 

                          2020 10:12:00 AM EDT completed                 e

CW1 ([facility])

 

                          2020 10:12:00 AM EDT completed                 e

CW1 ([facility])

 

                          2020 10:12:00 AM EDT completed                 e

CW1 ([facility])

 

                          2020 10:12:00 AM EDT completed                 e

CW1 ([facility])

 

                          2020 10:12:00 AM EDT completed                 e

CW1 ([facility])

 

                          2020 10:12:00 AM EDT completed                 e

CW1 ([facility])

 

                          2020 10:12:00 AM EDT completed                 e

CW1 ([facility])

 

                          2020 10:12:00 AM EDT completed                 e

CW1 ([facility])

 

                          2020 10:12:00 AM EDT completed                 e

CW1 ([facility])

 

                          2020 10:12:00 AM EDT completed                 e

CW1 ([facility])

 

                          2020 10:12:00 AM EDT completed                 e

CW1 ([facility])

 

                          2020 10:12:00 AM EDT completed                 e

CW1 ([facility])

 

                          2020 10:12:00 AM EDT completed                 e

CW1 ([facility])

 

                          2020 10:12:00 AM EDT completed                 e

CW1 ([facility])



                                                                                
                                                                                
                                                                                
                                                                                
                                                                                
                                                                                
                                                                                
                                                                                
                                                                                
                                                                                
                                                                                
                                                                                
                                                                                
                                                                                
    



Medications

          



          Medication Brand Name Start Date Product Form Dose      Route     Admi

nistrative 

Instructions Pharmacy Instructions Status     Indications Reaction   Description

 Data 

Source(s)

 

                          12 HR ranolazine 1000 MG Extended Release Oral Tablet 

[Ranexa] Ranexa 1000 MG 

Ranexa 1000 MG 10/19/2021 12:00:00 AM EDT        1.0 {tablet}                   

   active               Ranexa

 1000 MG                                eCW1 ([facility])

 

        Famotidine 40 MG Oral Tablet Famotidine 2021 12:00:00 AM EDT      

           ORAL                    

active                                                          [facility] ([facility]

[facility])

 

                    icosapent ethyl 1000 MG Oral Capsule [Vascepa] Vascepa      

       2021 12:00:00 AM 

EDT                  ORAL                 active                      [facility] ([facility]

[facility])

 

           nebivolol 5 MG Oral Tablet [Bystolic] Bystolic   2021 12:00:00 

AM EDT                       

ORAL                          active                                  [facility] ([facility]

[facility])

 

        Biotin 2.5 MG Oral Capsule Biotin  2021 12:00:00 AM EDT           

      ORAL                    active

                                                                [facility] ([facility]

[facility])

 

        valsartan 80 MG Oral Tablet Valsartan 2021 12:00:00 AM EDT        

         ORAL                    

active                                                          [facility] ([facility]

[facility])

 

                    Metoprolol Tartrate 25 MG Oral Tablet Metoprolol Tartrate  12:00:00 AM

 EDT                 ORAL                 completed                      MED[facility] 

([facility])

 

             Amlodipine 2.5 MG Oral Tablet Amlodipine Besylate 2021 12:00:

00 AM EDT               

        ORAL                    active                          [facility] ([facility]

[facility])

 

                    12 HR ranolazine 500 MG Extended Release Oral Tablet Ranolaz

ine ER       2021 

12:00:00 AM EDT               ORAL                 active                      M

EDENT ([facility])

 

                    1 ML alirocumab 150 MG/ML Auto-Injector [Praluent] Praluent 

           2021 12:00:00 

AM EDT               SUBCUTANEOUS               active                      MEDE

NT ([facility])

 

                    Promethazine Hydrochloride 25 MG Oral Tablet Promethazine HC

L    2021 

12:00:00 AM EDT               ORAL                 active                      M

EDENT ([facility])

 

     Zinc Chelated      2021 12:00:00 AM EDT           ORAL           acti

ve                MEDENT 

([facility])

 

     Magnesium      2021 12:00:00 AM EDT                          active  

              [facility] ([facility]

 [facility])

 

     Probiotic Probiotic 2021 12:00:00 AM EDT           ORAL           act

juany                MEDENT 

([facility])

 

             Amlodipine 2.5 MG Oral Tablet Amlodipine Besylate 07/15/2021 12:00:

00 AM EDT               

        ORAL                    completed                         [facility] ([facility]

[facility])

 

                    Metoprolol Tartrate 25 MG Oral Tablet Metoprolol Tartrate  12:00:00 AM

 EDT                 ORAL                 completed                      [facility] 

([facility])

 

          carvedilol 3.125 MG Oral Tablet Carvedilol 06/15/2021 12:00:00 AM EDT 

                    ORAL       

                      active                                      MED[facility] ([facility]

[facility])

 

                    24 HR Nitroglycerin 0.6 MG/HR Transdermal Patch Nitroglyceri

n       06/15/2021 

12:00:00 AM EDT                                    active                      M

EDENT ([facility])

 

                Repatha Pushtronex System Repatha Pushtronex System 06/15/2021 1

2:00:00 AM EDT  

                                        active                          [facility] (

[facility])

 

          carvedilol 3.125 MG Oral Tablet Carvedilol 06/15/2021 12:00:00 AM EDT 

                    ORAL       

                      completed                                   [facility] ([facility]

[facility])

 

                    24 HR Nitroglycerin 0.6 MG/HR Transdermal Patch Nitroglyceri

n       06/15/2021 

12:00:00 AM EDT                                    active                      M

EDENT ([facility])

 

                Repatha Pushtronex System Repatha Pushtronex System 06/15/2021 1

2:00:00 AM EDT  

                                        completed                         [facility]

 ([facility])

 

           clopidogrel 75 MG Oral Tablet [Plavix] Plavix     2021 12:00:00

 AM EDT                       

ORAL                          active                                  [facility] (Ca

[facility])

 

             Aspirin 325 MG Delayed Release Oral Tablet Aspirin      2021 

12:00:00 AM EDT              

        ORAL                    active                          [facility] ([facility]

[facility])

 

                          Vitamin B 12 1 MG/ML Injectable Solution Vitamin Defic

iency Injectable 

System-B12 2021 12:00:00 AM EDT                               active      

             [facility] ([facility])

 

                    12 HR ranolazine 500 MG Extended Release Oral Tablet [Ranexa

] Ranexa              2021 

12:00:00 AM EDT               ORAL                 completed                    

  [facility] ([facility])

 

                    Metoprolol Tartrate 25 MG Oral Tablet Metoprolol Tartrate  12:00:00 AM

 EDT                 ORAL                 completed                      [facility] 

([facility])

 

                    Meclizine Hydrochloride 25 MG Chewable Tablet Meclizine HCL 

      2021 12:00:00 

AM EDT               ORAL                 active                      [facility] ([facility]

[facility])

 

                Metoclopramide 10 MG Oral Tablet Metoclopramide HCL 2021 1

2:00:00 AM EDT  

                ORAL                    completed                         [facility]

 ([facility])

 

                    pantoprazole 40 MG Delayed Release Oral Tablet Pantoprazole 

Sodium 2021 

12:00:00 AM EDT               ORAL                 completed                    

  [facility] ([facility])

 

                    Acetaminophen 500 MG Oral Tablet [Tylenol] Tylenol Extra Str

ength 2021 

12:00:00 AM EDT               ORAL                 active                      M

EDENT ([facility])

 

                    tramadol hydrochloride 50 MG Oral Tablet Tramadol HCL       

 2021 12:00:00 AM EDT

                     ORAL                 active                      [facility] ([facility]

[facility])

 

                Nitroglycerin 0.4 MG Sublingual Tablet Nitroglycerin    12:00:00 AM EDT 

                SUBLINGUAL                 active                          MEDEN

T ([facility])

 

          Allopurinol 300 MG Oral Tablet Allopurinol 2021 12:00:00 AM EDT 

                    ORAL       

                      active                                      [facility] ([facility]

[facility])

 

                    Ergocalciferol 62535 UNT Oral Capsule Vitamin D (Ergocalcife

rol) 2021 

12:00:00 AM EDT               ORAL                 active                      M

EDENT ([facility])

 

             Amlodipine 2.5 MG Oral Tablet Amlodipine Besylate 2021 12:00:

00 AM EDT               

        ORAL                    active                          [facility] ([facility]

 [facility])

 

                Escitalopram 10 MG Oral Tablet Escitalopram Oxalate 2021 1

2:00:00 AM EDT  

                ORAL                    active                          [facility] (

[facility])

 

                    60 ACTUAT Albuterol 0.09 MG/ACTUAT Metered Dose Inhaler Albu

terol Sulfate HFA 

2021 12:00:00 AM EDT               RESPIRATORY               active       

               [facility] ([facility] 

[facility])

 

        valsartan 80 MG Oral Tablet Valsartan 2021 12:00:00 AM EDT        

         ORAL                    

active                                                          [facility] ([facility]

 [facility])

 

                    Metoprolol Tartrate 25 MG Oral Tablet Metoprolol Tartrate  12:00:00 AM

 EDT                 ORAL                 completed                      [facility] 

([facility])

 

                          Vitamin B 12 1 MG/ML Injectable Solution Vitamin Defic

iency Injectable 

System-B12 2021 12:00:00 AM EDT                               active      

             [facility] ([facility] 

[facility])

 

                    Meclizine Hydrochloride 25 MG Chewable Tablet Meclizine HCL 

      2021 12:00:00 

AM EDT               ORAL                 active                      [facility] ([facility]

[facility])

 

                    12 HR ranolazine 500 MG Extended Release Oral Tablet [Ranexa

] Ranexa              2021 

12:00:00 AM EDT               ORAL                 active                      M

[facility] ([facility]

 

                    pantoprazole 40 MG Delayed Release Oral Tablet Pantoprazole 

Sodium 2021 

12:00:00 AM EDT               ORAL                 active                      M

EDENT ([facility]

 

                Metoclopramide 10 MG Oral Tablet Metoclopramide HCL 2021 1

2:00:00 AM EDT  

                ORAL                    active                          [facility] (

[facility]

 

                    tramadol hydrochloride 50 MG Oral Tablet Tramadol HCL       

 2021 12:00:00 AM EDT

                     ORAL                 active                      [facility] ([facility])

 

                    Acetaminophen 500 MG Oral Tablet [Tylenol] Tylenol Extra Str

ength 2021 

12:00:00 AM EDT               ORAL                 active                      M

EDENT ([facility])

 

                Nitroglycerin 0.4 MG Sublingual Tablet Nitroglycerin    12:00:00 AM EDT 

                SUBLINGUAL                 active                          MED[facility]

T ([facility]

 

             Aspirin 325 MG Delayed Release Oral Tablet Aspirin      2021 

12:00:00 AM EDT              

        ORAL                    active                          [facility] ([facility]

 

        febuxostat 40 MG Oral Tablet Febuxostat 2021 12:00:00 AM EDT      

           ORAL                    

active                                                          [facility] ([facility]

 

           clopidogrel 75 MG Oral Tablet [Plavix] Plavix     2021 12:00:00

 AM EDT                       

ORAL                          active                                  [facility] ([facility]

[facility])

 

                    Ergocalciferol 10517 UNT Oral Capsule Vitamin D (Ergocalcife

rol) 2021 

12:00:00 AM EDT               ORAL                 active                      M

EDENT ([facility]

 

          Allopurinol 300 MG Oral Tablet Allopurinol 2021 12:00:00 AM EDT 

                    ORAL       

                      active                                      [facility] ([facility]

[facility])

 

        febuxostat 40 MG Oral Tablet Febuxostat 2021 12:00:00 AM EDT      

           ORAL                    

completed                                                       [facility] ([facility]

[facility])

 

                Escitalopram 10 MG Oral Tablet Escitalopram Oxalate 2021 1

2:00:00 AM EDT  

                ORAL                    active                          [facility] (

[facility])

 

             Amlodipine 2.5 MG Oral Tablet Amlodipine Besylate 2021 12:00:

00 AM EDT               

        ORAL                    completed                         [facility] ([facility]

[facility])

 

        valsartan 80 MG Oral Tablet Valsartan 2021 12:00:00 AM EDT        

         ORAL                    

completed                                                       [facility] ([facility]

[facility])

 

                    60 ACTUAT Albuterol 0.09 MG/ACTUAT Metered Dose Inhaler Albu

terol Sulfate HFA 

2021 12:00:00 AM EDT               RESPIRATORY               active       

               [facility] ([facility])

 

                    Escitalopram 10 MG Oral Tablet Escitalopram Oxalate Escitalo

pram Oxalate 

2021 01:58:22 PM EDT        10 MG                       active            

          [facility]

 

                          Amlodipine 2.5 MG Oral Tablet Amlodipine (Norvasc) 2.5

 mg tablet Amlodipine 

(Norvasc) 2.5 mg tablet 2021 01:58:22 PM EDT       2.5 MG                 

  active                   

[facility]

 

                    Albuterol Sulfate (Proventil Hfa) 90 mcg/actuation HFA aeros

ol inhaler                     

2021 01:58:22 PM EDT        90 MCG                      active            

          [facility]

 

     Febuxostat      2021 01:58:22 PM EDT      40 MG                active

                [facility]

 

                          valsartan 80 MG Oral Tablet Valsartan (Diovan) 80 mg t

ablet Valsartan (Diovan) 

80 mg tablet 2021 01:58:22 PM EDT       80 MG                   active    

               [facility]

 

                    60 ACTUAT Albuterol 0.09 MG/ACTUAT Metered Dose Inhaler Albu

terol Sulfate HFA 

2021 12:00:00 AM EDT               RESPIRATORY               active       

               MEDENT ([facility] )

 

                    valsartan 160 MG Oral Tablet Valsartan 160 MG Valsartan 160 

MG    2021 

12:00:00 AM EST        1.0 {tablet}                      active               Va

lsartan 160 MG eCW1 ([facility])

 

                    valsartan 160 MG Oral Tablet Valsartan 160 MG Valsartan 160 

MG    2021 

12:00:00 AM EST        1.0 {tablet}                      active               Va

lsartan 160 MG eCW1 ([facility])

 

                    valsartan 160 MG Oral Tablet Valsartan 160 MG Valsartan 160 

MG    2021 

12:00:00 AM EST        1.0 {tablet}                      active               Va

lsartan 160 MG eCW1 ([facility])

 

                    valsartan 160 MG Oral Tablet Valsartan 160 MG Valsartan 160 

MG    2021 

12:00:00 AM EST        1.0 {tablet}                      active               Va

lsartan 160 MG eCW1 ([facility])

 

                    valsartan 160 MG Oral Tablet Valsartan 160 MG Valsartan 160 

MG    2021 

12:00:00 AM EST        1.0 {tablet}                      active               Va

lsartan 160 MG eCW1 ([facility])

 

                    valsartan 160 MG Oral Tablet Valsartan 160 MG Valsartan 160 

MG    2021 

12:00:00 AM EST        1.0 {tablet}                      active               Va

lsartan 160 MG eCW1 ([facility])

 

                    valsartan 160 MG Oral Tablet Valsartan 160 MG Valsartan 160 

MG    2021 

12:00:00 AM EST        1.0 {tablet}                      active               Va

lsartan 160 MG eCW1 ([facility])

 

                    valsartan 160 MG Oral Tablet Valsartan 160 MG Valsartan 160 

MG    2021 

12:00:00 AM EST        1.0 {tablet}                      active               Va

lsartan 160 MG eCW1 ([facility])

 

                    valsartan 160 MG Oral Tablet Valsartan 160 MG Valsartan 160 

MG    2021 

12:00:00 AM EST        1.0 {tablet}                      active               Va

lsartan 160 MG eCW1 ([facility])

 

                    valsartan 160 MG Oral Tablet Valsartan 160 MG Valsartan 160 

MG    2021 

12:00:00 AM EST        1.0 {tablet}                      active               Va

lsartan 160 MG eCW1 ([facility])

 

                    valsartan 160 MG Oral Tablet Valsartan 160 MG Valsartan 160 

MG    2021 

12:00:00 AM EST        1.0 {tablet}                      active               Va

lsartan 160 MG eCW1 ([facility])

 

                    valsartan 160 MG Oral Tablet Valsartan 160 MG Valsartan 160 

MG    2021 

12:00:00 AM EST        1.0 {tablet}                      active               Va

lsartan 160 MG eCW1 ([facility])

 

                    valsartan 160 MG Oral Tablet Valsartan 160 MG Valsartan 160 

MG    2021 

12:00:00 AM EST        1.0 {tablet}                      active               Va

lsartan 160 MG eCW1 ([facility])

 

                    valsartan 160 MG Oral Tablet Valsartan 160 MG Valsartan 160 

MG    2021 

12:00:00 AM EST        1.0 {tablet}                      active               Va

lsartan 160 MG eCW1 ([facility])

 

                    valsartan 160 MG Oral Tablet Valsartan 160 MG Valsartan 160 

MG    2021 

12:00:00 AM EST        1.0 {tablet}                      active               Va

lsartan 160 MG eCW1 ([facility])

 

                    Fluconazole 150 MG Oral Tablet [Diflucan] Diflucan 150 MG Di

flucan 150 MG     

2020 12:00:00 AM EDT        1.0 {tablet}                      active      

         Diflucan 150 MG eCW1 

([facility])

 

                          Nystatin 100 UNT/MG Topical Ointment Nystatin 095617 U

NIT/GM Nystatin [id number] 

UNIT/GM 2020 12:00:00 AM EDT        1.0 {application}                     

 active               Nystatin

 [id number] UNIT/GM                         eCW1 ([facility]

samuel)

 

                          Nystatin 100 UNT/MG Topical Ointment Nystatin 712000 U

NIT/GM Nystatin [id number] 

UNIT/GM 2020 12:00:00 AM EDT        1.0 {application}                     

 active               Nystatin

 321736 UNIT/GM                         eCW1 ([facility]

samuel)

 

                    Fluconazole 150 MG Oral Tablet [Diflucan] Diflucan 150 MG Di

flucan 150 MG     

2020 12:00:00 AM EDT        1.0 {tablet}                      active      

         Diflucan 150 MG eCW1 

([facility])

 

                          Nystatin 100 UNT/MG Topical Ointment Nystatin 114595 U

NIT/GM Nystatin 575355 

UNIT/GM 2020 12:00:00 AM EDT        1.0 {application}                     

 active               Nystatin

 [id number] UNIT/GM                         eCW1 ([facility])

 

                          Nystatin 100 UNT/MG Topical Ointment Nystatin 723026 U

NIT/GM Nystatin 453726 

UNIT/GM 2020 12:00:00 AM EDT        1.0 {application}                     

 active               Nystatin

 030626 UNIT/GM                         eCW1 ([facility])

 

                          Nystatin 100 UNT/MG Topical Ointment Nystatin 240135 U

NIT/GM Nystatin 113492 

UNIT/GM 2020 12:00:00 AM EDT        1.0 {application}                     

 active               Nystatin

 177092 UNIT/GM                         eCW1 ([facility])

 

                          Nystatin 100 UNT/MG Topical Ointment Nystatin 669236 U

NIT/GM Nystatin 213149 

UNIT/GM 2020 12:00:00 AM EDT        1.0 {application}                     

 active               Nystatin

 327364 UNIT/GM                         eCW1 ([facility])

 

                          Nystatin 100 UNT/MG Topical Ointment Nystatin 141390 U

NIT/GM Nystatin 693357 

UNIT/GM 2020 12:00:00 AM EDT        1.0 {application}                     

 active               Nystatin

 283565 UNIT/GM                         eCW1 ([facility]

samuel)

 

                          Nystatin 100 UNT/MG Topical Ointment Nystatin 108744 U

NIT/GM Nystatin 232040 

UNIT/GM 2020 12:00:00 AM EDT        1.0 {application}                     

 active               Nystatin

 170509 UNIT/GM                         eCW1 ([facility])

 

                          Nystatin 100 UNT/MG Topical Ointment Nystatin 922997 U

NIT/GM Nystatin 480992 

UNIT/GM 2020 12:00:00 AM EDT        1.0 {application}                     

 active               Nystatin

 039712 UNIT/GM                         eCW1 ([facility])

 

                          Nystatin 100 UNT/MG Topical Ointment Nystatin 722809 U

NIT/GM Nystatin 789823 

UNIT/GM 2020 12:00:00 AM EDT        1.0 {application}                     

 active               Nystatin

 386445 UNIT/GM                         eCW1 ([facility]

samuel)

 

                          Nystatin 100 UNT/MG Topical Ointment Nystatin 568559 U

NIT/GM Nystatin 452343 

UNIT/GM 2020 12:00:00 AM EDT        1.0 {application}                     

 active               Nystatin

 284472 UNIT/GM                         eCW1 ([facility]

samuel)

 

                          Nystatin 100 UNT/MG Topical Ointment Nystatin 415442 U

NIT/GM Nystatin 091998 

UNIT/GM 2020 12:00:00 AM EDT        1.0 {application}                     

 active               Nystatin

 114171 UNIT/GM                         eCW1 ([facility]

samuel)

 

                          Nystatin 100 UNT/MG Topical Ointment Nystatin 416857 U

NIT/GM Nystatin 528735 

UNIT/GM 2020 12:00:00 AM EDT        1.0 {application}                     

 active               Nystatin

 740573 UNIT/GM                         eCW1 ([facility]

samuel)

 

                          Nystatin 100 UNT/MG Topical Ointment Nystatin 700658 U

NIT/GM Nystatin 991230 

UNIT/GM 2020 12:00:00 AM EDT        1.0 {application}                     

 active               Nystatin

 241543 UNIT/GM                         eCW1 ([facility]

samuel)

 

                          Nystatin 100 UNT/MG Topical Ointment Nystatin 062624 U

NIT/GM Nystatin 945496 

UNIT/GM 2020 12:00:00 AM EDT        1.0 {application}                     

 active               Nystatin

 817505 UNIT/GM                         eCW1 ([facility]

samuel)

 

                          Nystatin 100 UNT/MG Topical Ointment Nystatin 177259 U

NIT/GM Nystatin 594263 

UNIT/GM 2020 12:00:00 AM EDT        1.0 {application}                     

 active               Nystatin

 742202 UNIT/GM                         eCW1 ([facility]

samuel)

 

                          Nystatin 100 UNT/MG Topical Ointment Nystatin 703708 U

NIT/GM Nystatin 662745 

UNIT/GM 2020 12:00:00 AM EDT        1.0 {application}                     

 active               Nystatin

 619607 UNIT/GM                         eCW1 ([facility]

samuel)

 

                          Nystatin 100 UNT/MG Topical Ointment Nystatin 421841 U

NIT/GM Nystatin 900721 

UNIT/GM 2020 12:00:00 AM EDT        1.0 {application}                     

 active               Nystatin

 124255 UNIT/GM                         eCW1 ([facility]

samuel)

 

                          Nystatin 100 UNT/MG Topical Ointment Nystatin 871871 U

NIT/GM Nystatin 509189 

UNIT/GM 2020 12:00:00 AM EDT        1.0 {application}                     

 active               Nystatin

 858961 UNIT/GM                         eCW1 ([facility]

samuel)

 

                          Nystatin 100 UNT/MG Topical Ointment Nystatin 756311 U

NIT/GM Nystatin 282092 

UNIT/GM 2020 12:00:00 AM EDT        1.0 {application}                     

 active               Nystatin

 324502 UNIT/GM                         eCW1 ([facility]

samuel)

 

                          Nystatin 100 UNT/MG Topical Ointment Nystatin 179223 U

NIT/GM Nystatin 477374 

UNIT/GM 2020 12:00:00 AM EDT        1.0 {application}                     

 active               Nystatin

 155484 UNIT/GM                         eCW1 ([facility]

samuel)

 

                          Nystatin 100 UNT/MG Topical Ointment Nystatin 053557 U

NIT/GM Nystatin 808230 

UNIT/GM 2020 12:00:00 AM EDT        1.0 {application}                     

 active               Nystatin

 353772 UNIT/GM                         eCW1 ([facility]

samuel)

 

                          Nystatin 100 UNT/MG Topical Ointment Nystatin 467214 U

NIT/GM Nystatin 036010 

UNIT/GM 2020 12:00:00 AM EDT        1.0 {application}                     

 active               Nystatin

 719209 UNIT/GM                         eCW1 ([facility]

samuel)

 

                          Nystatin 100 UNT/MG Topical Ointment Nystatin 924028 U

NIT/GM Nystatin 071336 

UNIT/GM 2020 12:00:00 AM EDT        1.0 {application}                     

 active               Nystatin

 909623 UNIT/GM                         eCW1 ([facility]

samuel)

 

                          Nystatin 100 UNT/MG Topical Ointment Nystatin 068032 U

NIT/GM Nystatin 971648 

UNIT/GM 2020 12:00:00 AM EDT        1.0 {application}                     

 active               Nystatin

 143680 UNIT/GM                         eCW1 ([facility]

samuel)

 

                    Fluconazole 150 MG Oral Tablet [Diflucan] Diflucan 150 MG Di

flucan 150 MG     

2020 12:00:00 AM EDT        1.0 {tablet}                      active      

         Diflucan 150 MG eCW1 

([facility])

 

                          Nystatin 100 UNT/MG Topical Ointment Nystatin 083841 U

NIT/GM Nystatin 976028 

UNIT/GM 2020 12:00:00 AM EDT        1.0 {application}                     

 active               Nystatin

 239706 UNIT/GM                         eCW1 ([facility]

samuel)

 

                          Nystatin 100 UNT/MG Topical Ointment Nystatin 493421 U

NIT/GM Nystatin 819201 

UNIT/GM 2020 12:00:00 AM EDT        1.0 {application}                     

 active               Nystatin

 920111 UNIT/GM                         eCW1 ([facility]

samuel)

 

                          Nystatin 100 UNT/MG Topical Ointment Nystatin 072716 U

NIT/GM Nystatin 709655 

UNIT/GM 2020 12:00:00 AM EDT        1.0 {application}                     

 active               Nystatin

 435193 UNIT/GM                         eCW1 ([facility]

samuel)

 

                          Nystatin 100 UNT/MG Topical Ointment Nystatin 442325 U

NIT/GM Nystatin 966671 

UNIT/GM 2020 12:00:00 AM EDT        1.0 {application}                     

 active               Nystatin

 720495 UNIT/GM                         eCW1 ([facility]

samuel)

 

                          Nystatin 100 UNT/MG Topical Ointment Nystatin 329971 U

NIT/GM Nystatin 281817 

UNIT/GM 2020 12:00:00 AM EDT        1.0 {application}                     

 active               Nystatin

 846018 UNIT/GM                         eCW1 ([facility]

samuel)

 

                          Nystatin 100 UNT/MG Topical Ointment Nystatin 950591 U

NIT/GM Nystatin 116954 

UNIT/GM 2020 12:00:00 AM EDT        1.0 {application}                     

 active               Nystatin

 501754 UNIT/GM                         eCW1 ([facility]

samuel)

 

                          Nystatin 100 UNT/MG Topical Ointment Nystatin 260747 U

NIT/GM Nystatin 018927 

UNIT/GM 2020 12:00:00 AM EDT        1.0 {application}                     

 active               Nystatin

 839044 UNIT/GM                         eCW1 ([facility]

samuel)

 

                          Nystatin 100 UNT/MG Topical Ointment Nystatin 036747 U

NIT/GM Nystatin 422861 

UNIT/GM 2020 12:00:00 AM EDT        1.0 {application}                     

 active               Nystatin

 550248 UNIT/GM                         eCW1 ([facility]

samuel)

 

                          Nystatin 100 UNT/MG Topical Ointment Nystatin 774038 U

NIT/GM Nystatin 496279 

UNIT/GM 2020 12:00:00 AM EDT        1.0 {application}                     

 active               Nystatin

 530385 UNIT/GM                         eCW1 ([facility]

samuel)

 

                          Nystatin 100 UNT/MG Topical Ointment Nystatin 661247 U

NIT/GM Nystatin 423522 

UNIT/GM 2020 12:00:00 AM EDT        1.0 {application}                     

 active               Nystatin

 151443 UNIT/GM                         eCW1 ([facility]

samuel)

 

                          Nystatin 100 UNT/MG Topical Ointment Nystatin 005904 U

NIT/GM Nystatin 942574 

UNIT/GM 2020 12:00:00 AM EDT        1.0 {application}                     

 active               Nystatin

 794451 UNIT/GM                         eCW1 ([facility]

samuel)

 

                          Nystatin 100 UNT/MG Topical Ointment Nystatin 912459 U

NIT/GM Nystatin 367449 

UNIT/GM 2020 12:00:00 AM EDT        1.0 {application}                     

 active               Nystatin

 652050 UNIT/GM                         eCW1 ([facility]

samuel)

 

                          1 ML evolocumab 140 MG/ML Prefilled Syringe [Repatha] 

Repatha 140 MG/ML Repatha 

140 MG/ML 2020 12:00:00 AM EDT        1.0 {ml}                      active

               Repatha 140 

MG/ML                                   eCW1 ([facility]

samuel)

 

                          1 ML evolocumab 140 MG/ML Prefilled Syringe [Repatha] 

Repatha 140 MG/ML Repatha 

140 MG/ML 2020 12:00:00 AM EDT        1.0 {ml}                      active

               Repatha 140 

MG/ML                                   eCW1 ([facility]

samuel)

 

                          1 ML evolocumab 140 MG/ML Prefilled Syringe [Repatha] 

Repatha 140 MG/ML Repatha 

140 MG/ML 2020 12:00:00 AM EDT        1.0 {ml}                      active

               Repatha 140 

MG/ML                                   eCW1 ([facility]

samuel)

 

                          1 ML evolocumab 140 MG/ML Prefilled Syringe [Repatha] 

Repatha 140 MG/ML Repatha 

140 MG/ML 2020 12:00:00 AM EDT        1.0 {ml}                      active

               Repatha 140 

MG/ML                                   eCW1 ([facility]

samuel)

 

                          1 ML evolocumab 140 MG/ML Prefilled Syringe [Repatha] 

Repatha 140 MG/ML Repatha 

140 MG/ML 2020 12:00:00 AM EDT        1.0 {ml}                      active

               Repatha 140 

MG/ML                                   eCW1 ([facility]

samuel)

 

                          1 ML evolocumab 140 MG/ML Prefilled Syringe [Repatha] 

Repatha 140 MG/ML Repatha 

140 MG/ML 2020 12:00:00 AM EDT        1.0 {ml}                      active

               Repatha 140 

MG/ML                                   eCW1 ([facility]

samuel)

 

                          1 ML evolocumab 140 MG/ML Prefilled Syringe [Repatha] 

Repatha 140 MG/ML Repatha 

140 MG/ML 2020 12:00:00 AM EDT        1.0 {ml}                      active

               Repatha 140 

MG/ML                                   eCW1 ([facility]

samuel)

 

                          1 ML evolocumab 140 MG/ML Prefilled Syringe [Repatha] 

Repatha 140 MG/ML Repatha 

140 MG/ML 2020 12:00:00 AM EDT        1.0 {ml}                      active

               Repatha 140 

MG/ML                                   eCW1 ([facility]

samuel)

 

                          1 ML evolocumab 140 MG/ML Prefilled Syringe [Repatha] 

Repatha 140 MG/ML Repatha 

140 MG/ML 2020 12:00:00 AM EDT        1.0 {ml}                      active

               Repatha 140 

MG/ML                                   eCW1 ([facility]

samuel)

 

                          1 ML evolocumab 140 MG/ML Prefilled Syringe [Repatha] 

Repatha 140 MG/ML Repatha 

140 MG/ML 2020 12:00:00 AM EDT        1.0 {ml}                      active

               Repatha 140 

MG/ML                                   eCW1 ([facility]

samuel)

 

                          1 ML evolocumab 140 MG/ML Prefilled Syringe [Repatha] 

Repatha 140 MG/ML Repatha 

140 MG/ML 2020 12:00:00 AM EDT        1.0 {ml}                      active

               Repatha 140 

MG/ML                                   eCW1 ([facility]

samuel)

 

                          1 ML evolocumab 140 MG/ML Prefilled Syringe [Repatha] 

Repatha 140 MG/ML Repatha 

140 MG/ML 2020 12:00:00 AM EDT        1.0 {ml}                      active

               Repatha 140 

MG/ML                                   eCW1 ([facility]

samuel)

 

                          1 ML evolocumab 140 MG/ML Prefilled Syringe [Repatha] 

Repatha 140 MG/ML Repatha 

140 MG/ML 2020 12:00:00 AM EDT        1.0 {ml}                      active

               Repatha 140 

MG/ML                                   eCW1 ([facility]

samuel)

 

                          1 ML evolocumab 140 MG/ML Prefilled Syringe [Repatha] 

Repatha 140 MG/ML Repatha 

140 MG/ML 2020 12:00:00 AM EDT        1.0 {ml}                      active

               Repatha 140 

MG/ML                                   eCW1 ([facility]

samuel)

 

                          1 ML evolocumab 140 MG/ML Prefilled Syringe [Repatha] 

Repatha 140 MG/ML Repatha 

140 MG/ML 2020 12:00:00 AM EDT        1.0 {ml}                      active

               Repatha 140 

MG/ML                                   eCW1 ([facility]

[facility]uel)

 

                          1 ML evolocumab 140 MG/ML Prefilled Syringe [Repatha] 

Repatha 140 MG/ML Repatha 

140 MG/ML 2020 12:00:00 AM EDT        1.0 {ml}                      active

               Repatha 140 

MG/ML                                   eCW1 ([facility]

[facility]uel)

 

                          1 ML evolocumab 140 MG/ML Prefilled Syringe [Repatha] 

Repatha 140 MG/ML Repatha 

140 MG/ML 2020 12:00:00 AM EDT        1.0 {ml}                      active

               Repatha 140 

MG/ML                                   eCW1 ([facility]

samuel)

 

                          1 ML evolocumab 140 MG/ML Prefilled Syringe [Repatha] 

Repatha 140 MG/ML Repatha 

140 MG/ML 2020 12:00:00 AM EDT        1.0 {ml}                      active

               Repatha 140 

MG/ML                                   eCW1 ([facility]

samuel)

 

                          1 ML evolocumab 140 MG/ML Prefilled Syringe [Repatha] 

Repatha 140 MG/ML Repatha 

140 MG/ML 2020 12:00:00 AM EDT        1.0 {ml}                      active

               Repatha 140 

MG/ML                                   eCW1 ([facility]

samuel)

 

                          1 ML evolocumab 140 MG/ML Prefilled Syringe [Repatha] 

Repatha 140 MG/ML Repatha 

140 MG/ML 2020 12:00:00 AM EDT        1.0 {ml}                      active

               Repatha 140 

MG/ML                                   eCW1 ([facility]

[facility]uel)

 

                          1 ML evolocumab 140 MG/ML Prefilled Syringe [Repatha] 

Repatha 140 MG/ML Repatha 

140 MG/ML 2020 12:00:00 AM EDT        1.0 {ml}                      active

               Repatha 140 

MG/ML                                   eCW1 ([facility]

[facility]uel)

 

                          1 ML evolocumab 140 MG/ML Prefilled Syringe [Repatha] 

Repatha 140 MG/ML Repatha 

140 MG/ML 2020 12:00:00 AM EDT        1.0 {ml}                      active

               Repatha 140 

MG/ML                                   eCW1 ([facility]

[facility]uel)

 

                          1 ML evolocumab 140 MG/ML Prefilled Syringe [Repatha] 

Repatha 140 MG/ML Repatha 

140 MG/ML 2020 12:00:00 AM EDT        1.0 {ml}                      active

               Repatha 140 

MG/ML                                   eCW1 ([facility]

[facility]uel)

 

                          1 ML evolocumab 140 MG/ML Prefilled Syringe [Repatha] 

Repatha 140 MG/ML Repatha 

140 MG/ML 2020 12:00:00 AM EDT        1.0 {ml}                      active

               Repatha 140 

MG/ML                                   eCW1 ([facility]

samuel)

 

                          1 ML evolocumab 140 MG/ML Prefilled Syringe [Repatha] 

Repatha 140 MG/ML Repatha 

140 MG/ML 2020 12:00:00 AM EDT        1.0 {ml}                      active

               Repatha 140 

MG/ML                                   eCW1 ([facility]

[facility]uel)

 

                          1 ML evolocumab 140 MG/ML Prefilled Syringe [Repatha] 

Repatha 140 MG/ML Repatha 

140 MG/ML 2020 12:00:00 AM EDT        1.0 {ml}                      active

               Repatha 140 

MG/ML                                   eCW1 ([facility]

[facility]uel)

 

                          1 ML evolocumab 140 MG/ML Prefilled Syringe [Repatha] 

Repatha 140 MG/ML Repatha 

140 MG/ML 2020 12:00:00 AM EDT        1.0 {ml}                      active

               Repatha 140 

MG/ML                                   eCW1 ([facility]

[facility]uel)

 

                          1 ML evolocumab 140 MG/ML Prefilled Syringe [Repatha] 

Repatha 140 MG/ML Repatha 

140 MG/ML 2020 12:00:00 AM EDT        1.0 {ml}                      active

               Repatha 140 

MG/ML                                   eCW1 ([facility]

[facility]uel)

 

                          1 ML evolocumab 140 MG/ML Prefilled Syringe [Repatha] 

Repatha 140 MG/ML Repatha 

140 MG/ML 2020 12:00:00 AM EDT        1.0 {ml}                      active

               Repatha 140 

MG/ML                                   eCW1 ([facility]

[facility]uel)

 

                          1 ML evolocumab 140 MG/ML Prefilled Syringe [Repatha] 

Repatha 140 MG/ML Repatha 

140 MG/ML 2020 12:00:00 AM EDT        1.0 {ml}                      active

               Repatha 140 

MG/ML                                   eCW1 ([facility]

[facility]uel)

 

                          1 ML evolocumab 140 MG/ML Prefilled Syringe [Repatha] 

Repatha 140 MG/ML Repatha 

140 MG/ML 2020 12:00:00 AM EDT        1.0 {ml}                      active

               Repatha 140 

MG/ML                                   eCW1 ([facility]

samuel)

 

                                        Acetaminophen 325 MG / Oxycodone Hydroch

loride 5 MG Oral Tablet [Percocet] 

Percocet 5-325 MG Percocet 5-325 MG 2020 12:00:00 AM EDT                 1

.0 

{tablet_as_needed}                         active                  Percocet 5-32

5 MG eCW1 ([facility])

 

                                        Acetaminophen 325 MG / Oxycodone Hydroch

loride 5 MG Oral Tablet [Percocet] 

Percocet 5-325 MG Percocet 5-325 MG 2020 12:00:00 AM EDT                 1

.0 

{tablet_as_needed}                         active                  Percocet 5-32

5 MG eCW1 ([facility])

 

                                        Acetaminophen 325 MG / Oxycodone Hydroch

loride 5 MG Oral Tablet [Percocet] 

Percocet 5-325 MG Percocet 5-325 MG 2020 12:00:00 AM EDT                 1

.0 

{tablet_as_needed}                         active                  Percocet 5-32

5 MG eCW1 ([facility])

 

                                        Acetaminophen 325 MG / Oxycodone Hydroch

loride 5 MG Oral Tablet [Percocet] 

Percocet 5-325 MG Percocet 5-325 MG 2020 12:00:00 AM EDT                 1

.0 

{tablet_as_needed}                         active                  Percocet 5-32

5 MG eCW1 ([facility])

 

                                        Acetaminophen 325 MG / Oxycodone Hydroch

loride 5 MG Oral Tablet [Percocet] 

Percocet 5-325 MG Percocet 5-325 MG 2020 12:00:00 AM EDT                 1

.0 

{tablet_as_needed}                         suspended                 Percocet 5-

325 MG eCW1 ([facility])

 

                                        Acetaminophen 325 MG / Oxycodone Hydroch

loride 5 MG Oral Tablet [Percocet] 

Percocet 5-325 MG Percocet 5-325 MG 2020 12:00:00 AM EDT                 1

.0 

{tablet_as_needed}                         active                  Percocet 5-32

5 MG eCW1 ([facility])

 

                                        Acetaminophen 325 MG / Oxycodone Hydroch

loride 5 MG Oral Tablet [Percocet] 

Percocet 5-325 MG Percocet 5-325 MG 2020 12:00:00 AM EDT                 1

.0 

{tablet_as_needed}                         active                  Percocet 5-32

5 MG eCW1 ([facility])

 

                                        12 HR Amoxicillin 1000 MG / Clavulanate 

62.5 MG Extended Release Oral Tablet 

Amoxicillin-Pot Clavulanate ER 1000-62.5 MG Amoxicillin-Pot Clavulanate ER 1000-

62.5 MG 2020 12:00:00 AM EDT        2.0 {tablet}                      acti

ve               Amoxicillin-

Pot Clavulanate ER 1000-62.5 MG         eCW1 ([facility]

samuel)

 

                                        Acetaminophen 325 MG / Oxycodone Hydroch

loride 5 MG Oral Tablet [Percocet] 

Percocet 5-325 MG Percocet 5-325 MG 2020 12:00:00 AM EDT                 1

.0 

{tablet_as_needed}                         active                  Percocet 5-32

5 MG eCW1 ([facility]

 

                                        Acetaminophen 325 MG / Oxycodone Hydroch

loride 5 MG Oral Tablet [Percocet] 

Percocet 5-325 MG Percocet 5-325 MG 2020 12:00:00 AM EDT                 1

.0 

{tablet_as_needed}                         active                  Percocet 5-32

5 MG eCW1 ([facility] [facility]

 

                                        Acetaminophen 325 MG / Oxycodone Hydroch

loride 5 MG Oral Tablet [Percocet] 

Percocet 5-325 MG Percocet 5-325 MG 2020 12:00:00 AM EDT                 1

.0 

{tablet_as_needed}                         active                  Percocet 5-32

5 MG eCW1 ([facility]

 

                                        Acetaminophen 325 MG / Oxycodone Hydroch

loride 5 MG Oral Tablet [Percocet] 

Percocet 5-325 MG Percocet 5-325 MG 2020 12:00:00 AM EDT                 1

.0 

{tablet_as_needed}                         active                  Percocet 5-32

5 MG eCW1 ([facility]

 

                                        Acetaminophen 325 MG / Oxycodone Hydroch

loride 5 MG Oral Tablet [Percocet] 

Percocet 5-325 MG Percocet 5-325 MG 2020 12:00:00 AM EDT                 1

.0 

{tablet_as_needed}                         active                  Percocet 5-32

5 MG eCW1 ([facility]

 

                                        Acetaminophen 325 MG / Oxycodone Hydroch

loride 5 MG Oral Tablet [Percocet] 

Percocet 5-325 MG Percocet 5-325 MG 2020 12:00:00 AM EDT                 1

.0 

{tablet_as_needed}                         active                  Percocet 5-32

5 MG eCW1 ([facility]

 

                                        Acetaminophen 325 MG / Oxycodone Hydroch

loride 5 MG Oral Tablet [Percocet] 

Percocet 5-325 MG Percocet 5-325 MG 2020 12:00:00 AM EDT                 1

.0 

{tablet_as_needed}                         suspended                 Percocet 5-

325 MG eCW1 ([facility]

 

                                        Acetaminophen 325 MG / Oxycodone Hydroch

loride 5 MG Oral Tablet [Percocet] 

Percocet 5-325 MG Percocet 5-325 MG 2020 12:00:00 AM EDT                 1

.0 

{tablet_as_needed}                         suspended                 Percocet 5-

325 MG eCW1 ([facility]

 

                                        Acetaminophen 325 MG / Oxycodone Hydroch

loride 5 MG Oral Tablet [Percocet] 

Percocet 5-325 MG Percocet 5-325 MG 2020 12:00:00 AM EDT                 1

.0 

{tablet_as_needed}                         active                  Percocet 5-32

5 MG eCW1 ([facility]

 

                                        Acetaminophen 325 MG / Oxycodone Hydroch

loride 5 MG Oral Tablet [Percocet] 

Percocet 5-325 MG Percocet 5-325 MG 2020 12:00:00 AM EDT                 1

.0 

{tablet_as_needed}                         active                  Percocet 5-32

5 MG eCW1 ([facility]

 

                                        Acetaminophen 325 MG / Oxycodone Hydroch

loride 5 MG Oral Tablet [Percocet] 

Percocet 5-325 MG Percocet 5-325 MG 2020 12:00:00 AM EDT                 1

.0 

{tablet_as_needed}                         active                  Percocet 5-32

5 MG eCW1 ([facility]

 

                                        Acetaminophen 325 MG / Oxycodone Hydroch

loride 5 MG Oral Tablet [Percocet] 

Percocet 5-325 MG Percocet 5-325 MG 2020 12:00:00 AM EDT                 1

.0 

{tablet_as_needed}                         active                  Percocet 5-32

5 MG eCW1 ([facility]

 

                                        Acetaminophen 325 MG / Oxycodone Hydroch

loride 5 MG Oral Tablet [Percocet] 

Percocet 5-325 MG Percocet 5-325 MG 2020 12:00:00 AM EDT                 1

.0 

{tablet_as_needed}                         active                  Percocet 5-32

5 MG eCW1 ([facility]

 

                                        12 HR Amoxicillin 1000 MG / Clavulanate 

62.5 MG Extended Release Oral Tablet 

Amoxicillin-Pot Clavulanate ER 1000-62.5 MG Amoxicillin-Pot Clavulanate ER 1000-

62.5 MG 2020 12:00:00 AM EDT        2.0 {tablet}                      acti

ve               Amoxicillin-

Pot Clavulanate ER 1000-62.5 MG         eCW1 ([facility]

samuel)

 

                                        Acetaminophen 325 MG / Oxycodone Hydroch

loride 5 MG Oral Tablet [Percocet] 

Percocet 5-325 MG Percocet 5-325 MG 2020 12:00:00 AM EDT                 1

.0 

{tablet_as_needed}                         active                  Percocet 5-32

5 MG eCW1 ([facility]

 

                                        Acetaminophen 325 MG / Oxycodone Hydroch

loride 5 MG Oral Tablet [Percocet] 

Percocet 5-325 MG Percocet 5-325 MG 2020 12:00:00 AM EDT                 1

.0 

{tablet_as_needed}                         active                  Percocet 5-32

5 MG eCW1 ([facility]

 

                                        Acetaminophen 325 MG / Oxycodone Hydroch

loride 5 MG Oral Tablet [Percocet] 

Percocet 5-325 MG Percocet 5-325 MG 2020 12:00:00 AM EDT                 1

.0 

{tablet_as_needed}                         active                  Percocet 5-32

5 MG eCW1 ([facility]

 

                                        12 HR Amoxicillin 1000 MG / Clavulanate 

62.5 MG Extended Release Oral Tablet 

Amoxicillin-Pot Clavulanate ER 1000-62.5 MG Amoxicillin-Pot Clavulanate ER 1000-

62.5 MG 2020 12:00:00 AM EDT        2.0 {tablet}                      acti

ve               Amoxicillin-

Pot Clavulanate ER 1000-62.5 MG         eCW1 ([facility]

samuel)

 

                                        Acetaminophen 325 MG / Oxycodone Hydroch

loride 5 MG Oral Tablet [Percocet] 

Percocet 5-325 MG Percocet 5-325 MG 2020 12:00:00 AM EDT                 1

.0 

{tablet_as_needed}                         active                  Percocet 5-32

5 MG eCW1 ([facility]

 

                                        Acetaminophen 325 MG / Oxycodone Hydroch

loride 5 MG Oral Tablet [Percocet] 

Percocet 5-325 MG Percocet 5-325 MG 2020 12:00:00 AM EDT                 1

.0 

{tablet_as_needed}                         active                  Percocet 5-32

5 MG eCW1 ([facility]

 

                                        Acetaminophen 325 MG / Oxycodone Hydroch

loride 5 MG Oral Tablet [Percocet] 

Percocet 5-325 MG Percocet 5-325 MG 2020 12:00:00 AM EDT                 1

.0 

{tablet_as_needed}                         active                  Percocet 5-32

5 MG eCW1 ([facility]

 

                                        Acetaminophen 325 MG / Oxycodone Hydroch

loride 5 MG Oral Tablet [Percocet] 

Percocet 5-325 MG Percocet 5-325 MG 2020 12:00:00 AM EDT                 1

.0 

{tablet_as_needed}                         active                  Percocet 5-32

5 MG eCW1 ([facility]

 

                                        Acetaminophen 325 MG / Oxycodone Hydroch

loride 5 MG Oral Tablet [Percocet] 

Percocet 5-325 MG Percocet 5-325 MG 2020 12:00:00 AM EDT                 1

.0 

{tablet_as_needed}                         active                  Percocet 5-32

5 MG eCW1 ([facility]

 

                                        Acetaminophen 325 MG / Oxycodone Hydroch

loride 5 MG Oral Tablet [Percocet] 

Percocet 5-325 MG Percocet 5-325 MG 2020 12:00:00 AM EDT                 1

.0 

{tablet_as_needed}                         suspended                 Percocet 5-

325 MG eCW1 ([facility]

 

                                        Acetaminophen 325 MG / Oxycodone Hydroch

loride 5 MG Oral Tablet [Percocet] 

Percocet 5-325 MG Percocet 5-325 MG 2020 12:00:00 AM EDT                 1

.0 

{tablet_as_needed}                         active                  Percocet 5-32

5 MG eCW1 ([facility]

 

                                        Acetaminophen 325 MG / Oxycodone Hydroch

loride 5 MG Oral Tablet [Percocet] 

Percocet 5-325 MG Percocet 5-325 MG 2020 12:00:00 AM EDT                 1

.0 

{tablet_as_needed}                         active                  Percocet 5-32

5 MG eCW1 ([facility]

 

                                        Acetaminophen 325 MG / Oxycodone Hydroch

loride 5 MG Oral Tablet [Percocet] 

Percocet 5-325 MG Percocet 5-325 MG 2020 12:00:00 AM EDT                 1

.0 

{tablet_as_needed}                         suspended                 Percocet 5-

325 MG eCW1 ([facility]

 

                                        Acetaminophen 325 MG / Oxycodone Hydroch

loride 5 MG Oral Tablet [Percocet] 

Percocet 5-325 MG Percocet 5-325 MG 2020 12:00:00 AM EDT                 1

.0 

{tablet_as_needed}                         active                  Percocet 5-32

5 MG eCW1 ([facility]

 

                                        12 HR Amoxicillin 1000 MG / Clavulanate 

62.5 MG Extended Release Oral Tablet 

Amoxicillin-Pot Clavulanate ER 1000-62.5 MG Amoxicillin-Pot Clavulanate ER 1000-

62.5 MG 2020 12:00:00 AM EDT        2.0 {tablet}                      acti

ve               Amoxicillin-

Pot Clavulanate ER 1000-62.5 MG         eCW1 ([facility]

[facility]uel)

 

                                        12 HR Amoxicillin 1000 MG / Clavulanate 

62.5 MG Extended Release Oral Tablet 

Amoxicillin-Pot Clavulanate ER 1000-62.5 MG Amoxicillin-Pot Clavulanate ER 1000-

62.5 MG 2020 12:00:00 AM EDT        2.0 {tablet}                      acti

ve               Amoxicillin-

Pot Clavulanate ER 1000-62.5 MG         eCW1 ([facility]

samuel)

 

                                        Acetaminophen 325 MG / Oxycodone Hydroch

loride 5 MG Oral Tablet [Percocet] 

Percocet 5-325 MG Percocet 5-325 MG 2020 12:00:00 AM EDT                 1

.0 

{tablet_as_needed}                         active                  Percocet 5-32

5 MG eCW1 ([facility]

 

                                        Acetaminophen 325 MG / Oxycodone Hydroch

loride 5 MG Oral Tablet [Percocet] 

Percocet 5-325 MG Percocet 5-325 MG 2020 12:00:00 AM EDT                 1

.0 

{tablet_as_needed}                         active                  Percocet 5-32

5 MG eCW1 ([facility]

 

                                        Acetaminophen 325 MG / Oxycodone Hydroch

loride 5 MG Oral Tablet [Percocet] 

Percocet 5-325 MG Percocet 5-325 MG 2020 12:00:00 AM EDT                 1

.0 

{tablet_as_needed}                         active                  Percocet 5-32

5 MG eCW1 ([facility]

 

                                        Acetaminophen 325 MG / Oxycodone Hydroch

loride 5 MG Oral Tablet [Percocet] 

Percocet 5-325 MG Percocet 5-325 MG 2020 12:00:00 AM EDT                 1

.0 

{tablet_as_needed}                         active                  Percocet 5-32

5 MG eCW1 ([facility]

 

                                        Acetaminophen 325 MG / Oxycodone Hydroch

loride 5 MG Oral Tablet [Percocet] 

Percocet 5-325 MG Percocet 5-325 MG 2020 12:00:00 AM EDT                 1

.0 

{tablet_as_needed}                         suspended                 Percocet 5-

325 MG eCW1 ([facility]

 

                                        Acetaminophen 325 MG / Oxycodone Hydroch

loride 5 MG Oral Tablet [Percocet] 

Percocet 5-325 MG Percocet 5-325 MG 2020 12:00:00 AM EDT                 1

.0 

{tablet_as_needed}                         suspended                 Percocet 5-

325 MG eCW1 ([facility]

 

                    Hydrochlorothiazide 12.5 MG Oral Tablet Hydrochlorothiazide 

2014 10:17:00 

PM EDT        12.5 MG                      completed                      [facility]

 

             Losartan Potassium 50 MG Oral Tablet Losartan     2014 10:17:

00 PM EDT              50 MG

                                completed                         [facility]

 

                                        60 ACTUAT Fluticasone propionate 0.1 MG/

ACTUAT / salmeterol 0.05 MG/ACTUAT Dry 

Powder Inhaler Fluticasone Propion-Salmeterol (Advair 100-50 Diskus) 1 EACH 
blister with device                     Fluticasone Propion-Salmeterol (Advair 1

00-50 Diskus) 1 EACH

 blister with device 2014 10:17:00 PM EDT       1 PUFFS                   

completed                   

[facility]

 

                          Rosuvastatin calcium 20 MG Oral Tablet Rosuvastatin (C

restor) 20 MG tablet 

Rosuvastatin (Crestor) 20 MG tablet 2014 10:17:00 PM EDT           20 MG  

                                 

completed                                                       [facility]

 

                    Promethazine Hydrochloride 25 MG Oral Tablet Promethazine   

     2014 10:17:00 PM

 EDT          25 MG                       completed                      [facility]

 

                gabapentin 300 MG Oral Capsule Gabapentin Gabapentin      2014 10:17:00 PM EDT 

        300 MG                          completed                         [facility]

 

        Sucralfate 1000 MG Oral Tablet Sucralfate 2014 10:17:00 PM EDT    

     1 G                      

             completed                                           [facility]

 

                          ezetimibe 10 MG Oral Tablet Ezetimibe (Zetia) 10 MG ta

blet Ezetimibe (Zetia) 10 

MG tablet 2014 10:17:00 PM EDT       10 MG                   completed    

               [facility]

 

                          Alprazolam 0.25 MG Oral Tablet Alprazolam (Xanax) 0.25

 MG tablet Alprazolam 

(Xanax) 0.25 MG tablet 2014 10:17:00 PM EDT        0.25 MG                

      completed               

                                        [facility]



                                                                                
                                                                                
                                                                                
                                                                                
                                                                                
                                                                                
                                                                                
                                                                                
                                                                                
                                                                                
                                                                                
                                                                                
                                                                                
                                                                                
                                                                                
                                                                                
                                                                                
                                                                                
                                                                                
                                                                                
                                                                                
                                                                                
                                                                                
                                                                                
                                                                                
                                                                                
        



Insurance Providers

          



             Payer name   Policy type / Coverage type Policy ID    Covered party

 ID Covered 

party's relationship to villanueva Policy [place]             Plan Information

 

          MEDICARE            [id number]  xxxxxxxxxxx                     [id number]

 

          [employer]           [id number]           S            

       [id number]

 

          [place]           [id number]           S                  

 [id number]

 

          MEDICARE            [id number]           Priti                 [id number]

G75

 

          MEDICARE            [id number]           Priti                 [id number]

A

 

          [employer]           [id number]           S           

        [id number]

 

          [place]           [id number]           S                 

  [id number]

 

          MEDICARE  M         [id number]           S                   [id number]

A

 

          MEDICARE            [id number]           SP                  [id number]

G75

 

          MEDICARE  A         [id number]           Self                [id number]

A

 

          Medicare Part B [facility]           [id number]           0     

              [id number]

 

                Medicare Part B Medicare Primary [id number]     

[id number] Self                                    

[id number]

 

                [employer]                 

[id number] Self                                     

 

           Medicare [facility] Medicare Primary            [id number].

[id number] Self       

                                         

 

          Medicaid [place] Medigap Part B           [id number]

935.0 Self                 

 

          BC MEDICARE 11        [id number]           1                   [id number]

[id number]

 

          [facility]                 [id number]           Priti                 [id number]

[id number]

 

          [employer] OPTIONS           [id number]           SP            

      [id number]

 

          [employer]IONS           [id number]           SP           

       [id number]

 

          [facility]                 [id number]           Priti                 [id number]

1

 

                [employer] Medigap Part B  [id number]     

[id number] Self                                    

[id number]

 

          [facility]                 [id number]           Priti                 [id number]

011

 

          [facility]                 [id number]  xxxxxxxxxxx                     [id number]

 

          [facility]                 [id number]           Priti                 [id number]

 

          [employer]           [id number]           0            

       [id number]

 

          [employer]      U         [id number]           Self                [id number]

011

 

          [employer] OPTIONS           [id number]                      

       [id number]

 

                    ANSI-Medicare Part B [id number]   

                            

[id number]

 

                    ANSI-Commercial [id number]        

                       

[id number]

 

                    ANSI-Commercial [id number]        

                       

[id number]

 

                    ANSI-Medicare Part B [id number]   

                            

[id number]

 

                    ANSI-Medicare Part B [id number]   

                            

[id number]

 

                    ANSI-Commercial [id number]        

                       

[id number]

 

                    ANSI-Commercial [id number]        

                       

[id number]

 

                    ANSI-Medicare Part B [id number]   

                            

[id number]

 

                    ANSI-Commercial [id number]        

                       

[id number]

 

                    ANSI-Medicare Part B [id number]   

                            

[id number]

 

                    ANSI-Medicare Part B [id number]   

                            

[id number]

 

                    ANSI-Commercial [id number]        

                       

[id number]

 

                    ANSI-Medicare Part B [id number]   

                            

[id number]

 

                    ANSI-Commercial [id number]        

                       

[id number]

 

                    ANSI-Medicare Part B [id number]   

                            

[id number]

 

                    ANSI-Commercial [id number]        

                       

[id number]

 

                    ANSI-Medicare Part B [id number]   

                            

[id number]

 

                    ANSI-Commercial [id number]        

                       

[id number]

 

                    ANSI-Commercial [id number]        

                       

[id number]

 

                    ANSI-Medicare Part B [id number]   

                            

[id number]

 

                    ANSI-Medicare Part B [id number]   

                            

[id number]

 

                    ANSI-Commercial [id number]        

                       

[id number]

 

                    ANSI-Commercial [id number]        

                       

[id number]

 

                    ANSI-Medicare Part B [id number]   

                            

[id number]

 

                    ANSI-Commercial [id number]        

                       

[id number]

 

                    ANSI-Medicare Part B [id number]   

                            

[id number]

 

                    ANSI-Medicare Part B [id number]   

                            

[id number]

 

                    ANSI-Commercial [id number]        

                       

[id number]

 

                    ANSI-Medicare Part B [id number]   

                            

[id number]

 

                    ANSI-Commercial [id number]        

                       

[id number]

 

                    ANSI-Medicare Part B [id number]   

                            

[id number]-nk10141136d6

 

                    ANSI-Commercial 7x48hu02-o38q-3wk3-e580-11u545w58628        

                       

3k22ep85-v18s-0jj0-[id number]

 

                    ANSI-Medicare Part B [id number]   

                            

[id number]

 

                    ANSI-Commercial 9647j5t7-2983-6o27-fyfk-[id number]        

                       

[id number]-fuyk-486t73953599

 

                    ANSI-Medicare Part B [id number]   

                            

[id number]

 

                    ANSI-Commercial 2608ajdk-11i0-7lh406j5-7js6-4u9r-46bk4343r2qu        

                       

0954rvzo-62e6-1rh404l2-3uz2-0r7d-39vp1762s8qm

 

                    ANSI-Commercial 18756w1o-f661-6yp3-y120-2w665iy72t4u        

                       

32373o9v-[id number]-[id number]

 

                    ANSI-Medicare Part B [id number]   

                            

[id number]

 

                    ANSI-Commercial 0m27hbz8-2dnt-9477-2x15-2b35ruxc4101        

                       

9q08yyl9-5udj-0578-0r72-5z28vsiu4810

 

                    ANSI-Medicare Part B [id number]   

                            

[id number]

 

                    ANSI-Medicare Part B [id number]   

                            

[id number]

 

                    ANSI-Commercial 9qa92q5b-7nsl-1458-a82a-028l6217sv35        

                       

6gz31i4q-6tca-9363-u88n-186j0097yx05

 

                    ANSI-Medicare Part B [id number]   

                            

[id number]-gj05-s5xbt61a7k09

 

                    ANSI-Commercial 911t601y-0o89-1lx6-jx78-1h19180y7146        

                       

899j487g-0f57-7rf0-ow06-2y29845w1756

 

                    ANSI-Medicare Part B [id number]   

                            

[id number]-y8ig-gh8o9sw3mj86

 

                    ANSI-Commercial 4ii454n9-5c1c-871s-339r-40575gw20o8d        

                       

2wx847h2-1d0i-924f-302f-13911sy43k0y

 

                    ANSI-Commercial gl0w61pa-r947-4r22-9w03-7292y2r188y2        

                       

lm0n82by-p619-6a94-8x39-3590w2o186n3

 

                    ANSI-Medicare Part B [id number]   

                            

[id number]-65r24x4l7760

 

           [facility] Part B [id number] ..840.1.212787.3.227.99.864

6.985145.0 Self        

                                        [id number]

 

                Medicare [facility] Medicare Primary [id number]     

..[id number] Self                                   

 [id number]

 

                    ANSI-Commercial 6sqv9mz4-e651-1t72-soc6-x80125806z2e        

                       

8ryq0yn0-c151-8x56-oxk8-q81933847i1g

 

                    ANSI-Medicare Part B [id number]   

                            

[id number]

 

                    ANSI-Medicare Part B [id number]   

                            

[id number]-c252-64h175ezz472

 

                    ANSI-Commercial 9t06pc52-1487-9544-j004-rw8947e2p883        

                       

8l46vz78-5050-9097-e436-yz2876g8r232

 

                    ANSI-Medicare Part B [id number]   

                            

[id number]-e94i-4tke-0kk3-18ur817oko0q

 

                    ANSI-Commercial 9h63pk33-83qf-89qz-5281-0243g6j7655d        

                       

2q00ut06-24rl-00ey-9233-8855k4q2451e

 

                    ANSI-Medicare Part B [id number]   

                            

[id number]-74l6-97y80d078s7z

 

                    ANSI-Commercial tl2o4375-f902-99m2-i6ps-o11852289407        

                       

xn0e8768-t335-44s4-m6vv-l97503085523

 

                    ANSI-Commercial 7vk54672-y399-7y2s-p035-3rn52h4e5f61        

                       

8kg75660-l194-2p1w-c808-0ub87c9f8o52

 

                    ANSI-Medicare Part B [id number]   

                            

5n5js44p-6913-2g33-055j-6s7i24d32108

 

                    ANSI-Medicare Part B [id number]   

                            

4igo46x1-y625-7c6r-1p83-90516sy60gl2

 

                    ANSI-Commercial 7c6y08h0-3w0w-0794-52zb-9h07f3327100        

                       

8t6r74g2-4h3p-1781-03hc-1l17q4939453

 

                    ANSI-Commercial 22p4042x-yu9c-6ia4-p060-w2w052t45up6        

                       

34i5982o-sb6y-0ha9-i668-w1i256c37fh8

 

                    ANSI-Medicare Part B [id number]   

                            

6f2oy107-dr4u-[id number]-7c013h55d447

 

                    ANSI-Commercial 82357175-6q92-382b-z750-j45805fz1hkf        

                       

12497518-4y95-632d-z715-q75320hl0byd

 

                    ANSI-Medicare Part B [id number]   

                            

[id number]-[id number]

 

                    ANSI-Commercial 35ibl6ap-2b36-3u5m-g9b0-1488m293yy85        

                       

31fzb9sy-4i41-8b4e-l1r5-1250q890wd47

 

                    ANSI-Medicare Part B [id number]-[id number]   

                            

02e0nzjd-0kqn-2p69-he58-m2x1p87q81a6

 

          MEDICARE            [id number]           SP                  [id number]

A

 

          MEDICARE C         [id number] S                   [id number]

A

 

          [facility]                 PI                                      PI

 

          MEDICARE            PI                                      PI

 

          MEDICARE PART A             -O/P           [id number]           18    

              [id number]

 

          AARP HEALTH CARE OPTIONS-O/P           [id number]           18       

           [id number]

 

          AARP HEALTH CARE OPTIONS           [id number]           18           

       [id number]

 

          MEDICARE PART A [facility]        [id number]           18                  

[id number]

 

                [employer] [facility] [employer] [facility] Medigap Part B  [id number]     

2.0.1.035999.3.227.99.510.2647.0 Self                                    33

[id number]

 

                Medicare Part A [place] Medicare Primary [id number]      

2.0.1.309652.3.227.99.510.2647.0 Self                                    08

[id number]

 

                [employer] [facility] [employer] [facility] Medigap Part B  [id number]     

2.0.1.286552.3.227.99.510.2647.0 Self                                    33

[id number]

 

                Medicare Part A [place] Medicare [place] [id number]      

2.16840.1.964979.3.227.99.510.2647.0 Self                                    08

[id number]

 

                [employer] [facility] [employer] [facility] Medigap Part B  [id number]     

2.840.1.449271.3.227.99.510.2647.0 Self                                    33

[id number]

 

                Medicare Part A [place] Medicare Primary [id number]      

2.840.1.060946.3.227.99.510.2647.0 Self                                    08

[id number]

 

                [employer] Supplemental Plan Medigap Part B  [id number]     

2.840.1.428848.3.227.99.802.564977.0 Self                                    

[id number]

 

             Medicare     Medicare Primary [id number]   2.840.1.583778.3.227.

99.802.694908.0 

Self                                                [id number]

 

                [employer] [facility] [employer] [facility] Medigap Part B  [id number]     

840.1.316610.3.227.99.1767.33433.0 Self                                    

[id number]

 

                Medicare [employer] Medicare Primary [id number]      

840.1.684107.3.227.99.1767.57692.0 Self                                    

[id number]

 

                [employer] Supplemental Plan Medigap Part B  [id number]     

2840.1.742485.3.227.99.802.429858.0 Self                                    

[id number]

 

             Medicare     Medicare Primary [id number]   2840.1.017351.3.227.

99.802.339755.0 

Self                                                [id number]

 

                [employer] Supplemental Plan Medigap Part B  [id number]     

2840.1.495979.3.227.99.802.671835.0 Self                                    

[id number]

 

             Medicare     Medicare Primary [id number]   2840.1.256207.3.227.

99.802.437837.0 

Self                                                [id number]

 

                [facility] Aarp Medigap Part B  [id number]     

2.840.1.787960.3.227.99.9487.56649.0 Self                                    

[id number]

 

                Medicare Part B Medicare Primary [id number]      

2.16840.1.085744.3.227.99.9487.30483.0 Self                                    

[id number]

 

          [employer] Supplemental Plan Medigap Part B           [id number]    Self        

         

 

          Medicare  Medicare Primary           [id number]    Self                 

 

             [facility] [facility] Medigap Part B              2.0.1.1138

83.3.227.99.991.99044.0 

Self                                                 

 

             Medicare [employer] Medigap Part B              2.0.1.164307

.3.227.99.991.86581.0 

Self                                                 

 

          AARP HEALTH CARE OPTIONS-O/P           [id number]           18         

         [id number]

 

          [employer] [facility] [employer] Options Medigap Part B           38735     Self      

           

 

          Medicare [place]           [id number]     Self            

     

 

          AARP      O         [id number] S                   [id number]

011

 

          [employer] [facility] Medigap Part B           358761    Self              

   

 

          AARP HEALTH CARE OPTIONS    -O/P           [id number]           18   

               [id number]

 

          MEDICARE                    -O/P           [id number]           18    

              [id number]

 

          AARP HEALTH CARE O         [id number]           S                   [id number]

[id number]

 

          TODAYS OPTIONS O         [id number]           S                   [id number]

[id number]

 

          MEDICARE                    -O/P           [id number]           18    

              [id number]

 

          MEDICARE PART B             -PHYSICIAN           [id number]           

18                  [id number]

 

          AARP HEALTH CARE OPTIONS    -PHYSICIAN           [id number]         

  18                  [id number]

 

          AARP HEALTH CARE OPTIONS-CLINIC           [id number]           18   

               [id number]

 

          MEDICARE PART A-CLINIC           [id number]           18              

    [id number]

 

          SELF PAY  2         UNAVAILABLE           1                   UNAVAILA

BLE

 

          NF MISCELLANEOUS           [id number]             PT                  110

[id number]

 

          [employer]ARP HE[employer] CARE OPTIONS           [id number]           PT           

       [id number]

 

          MEDICARE PART B           [id number]           PT                  [id number]

[id number]

 

          MEDICARE PART A           [id number]           PT                  089

[id number]

 

          MEDICARE            [id number]           SP                  [id number]

[id number]

 

                              [id number]                               ZCT0

[id number]

 

          [employer]ARP HE[employer] CARE OPTIONS           [id number]           SP           

       [id number]

 

          UPSTATE MEDICARE DIVISION           [id number]           S           

        [id number]

 

          MEDICARE [place]           [id number]           S                 

  [id number]

 

          AARP HEALTH CARE OPTIONS           [id number]           S            

       [id number]

 

          AARP HE[employer] CARE OPTIONS           [id number]           S            

       [id number]

 

          AARP HE[employer] CARE OPTIONS           [id number]           S             

      [id number]

 

          AARP HE[employer] CARE OPTIONS           [id number]           S            

       [id number]

 

          UPSTATE MEDICARE DIVISION           [id number]           S           

        [id number]

 

          [place]ICARE [place]           [id number]           S                 

  [id number]

 

          UPSTATE MEDICARE DIVISION           [id number]           S            

       [id number]

 

          [place]ICARE [place]           [id number]           S                  

 [id number]

 

          AARP HEALTH CARE OPTIONS           [id number]           S            

       [id number]

 

          MEDICARE C         [id number] S                   [id number]

G75

 

          AARP      O         [id number] S                   [id number]

611

 

                    ANSI-Medicare Part B [id number]   

                            

[id number]

 

                    ANSI-Commercial [id number]-bd5165xz6d70        

                       

[id number]

 

                    ANSI-Medicare Part B [id number]   

                            

[id number]

 

                    ANSI-Commercial [id number]-[id number]        

                       

[id number]

 

                    ANSI-Commercial 925c8ezg-7h19-777p-68g0-2fu9t333em7t        

                       

[id number]

 

                    ANSI-Medicare Part B [id number]   

                            

[id number]

 

                    ANSI-Medicare Part B [id number]   

                            

[id number]

 

                    [facility] [id number]        

                       

[id number]

 

                    [facility] [id number]        

                       

[id number]

 

                    ANSI-Medicare Part B [id number]   

                            

[id number]

 

                    [facility] [id number]        

                       

[id number]

 

                    ANSI-Medicare Part B [id number]   

                            

[id number]



                                                                                
                                                                                
                                                                                
                                                                                
                                                                                
                                                                                
                                                                                
                                                                                
                                                                                
                                                                                
                                                                                
                                                                                
                                                                                
                                                                                
                                                                                
                                                                                
                                                                                
                                                                                
                                                                                
                                                                                
                                                                                
                                                                                
                  



Problems, Conditions, and Diagnoses

          



           Code       Display Name Description Problem Type Effective Dates Data

 Source(s)

 

                    Z87.891             Personal history of nicotine dependence 

PERSONAL HISTORY OF NICOTINE 

DEPENDENCE          Diagnosis           2021 03:40:00 PM [facility]

 

                    Z79.899             Other long term (current) drug therapy O

THER LONG TERM (CURRENT) DRUG 

THERAPY             Diagnosis           2021 03:40:00 PM [facility]

 

                    Z79.891             Long term (current) use of opiate analge

sic LONG TERM (CURRENT) USE OF 

OPIATE ANALGESIC    Diagnosis           2021 03:40:00 PM [facility]

 

                Z79.82          Long term (current) use of aspirin LONG TERM (CU

RRENT) USE OF ASPIRIN 

Diagnosis                 2021 03:40:00 PM [facility]

 

                    Z79.02              Long term (current) use of antithromboti

cs/antiplatelets LONG TERM 

(CURRENT) USE OF ANTITHROMBOTICS/ANTIPLA Diagnosis           2021 03:40:00

 PM [facility]

 

                    Z95.1               Presence of aortocoronary bypass graft P

RESENCE OF AORTOCORONARY BYPASS 

GRAFT               Diagnosis           2021 03[facility]40[facility]

l

 

                    E11.9               Type 2 diabetes mellitus without complic

ations TYPE 2 DIABETES MELLITUS 

WITHOUT COMPLICATIONS Diagnosis           2021 03:40:00 PM [facility]

pallavi

 

             I25.2        Old myocardial infarction OLD MYOCARDIAL INFARCTION Di

agnosis    2021 

03:40:00 PM [facility]

 

             I10          Essential (primary) hypertension ESSENTIAL (PRIMARY) H

YPERTENSION Diagnosis    

2021 03:40:00 PM [facility]

 

           R07.2      Precordial pain PRECORDIAL PAIN Diagnosis  2021 03:4

0:00 PM [facility]

 

                    Z87.898             Personal history of other specified cond

itions PERSONAL HISTORY OF OTHER

 SPECIFIED CONDITIONS Diagnosis           2021 12:55:00 PM [facility]

 

           R07.89     Other chest pain OTHER CHEST PAIN Diagnosis  2021 12

:55:00 PM [facility]

 

             R06.02       Shortness of breath SHORTNESS OF BREATH Diagnosis    0

3/23/2021 12:55:00 PM 

[facility]

 

                    C64.2               Malignant neoplasm of left kidney, excep

t renal pelvis MALIGNANT NEOPLASM 

OF LEFT KIDNEY, EXCEPT RENAL PE Diagnosis           2020 01:00:00 PM [facility]

 

                    K21.9               Gastro-esophageal reflux disease without

 esophagitis GASTRO-ESOPHAGEAL 

REFLUX DISEASE WITHOUT ESOPHAGIT Diagnosis           2020 01:00:00 PM [facility]

 

                    E53.8               Deficiency of other specified B group vi

tamins DEFICIENCY OF OTHER 

SPECIFIED B GROUP VITAMINS Diagnosis           2020 01:00:00 PM [facility]

 

             E78.5        Hyperlipidemia, unspecified HYPERLIPIDEMIA, UNSPECIFIE

D Diagnosis    

2020 01:00:00 PM [facility]

 

             Z71.89       Other specified counseling OTHER SPECIFIED COUNSELING 

Diagnosis    

10/14/2020 10:30:00 AM [facility]

 

             Z23          Encounter for immunization ENCOUNTER FOR IMMUNIZATION 

Diagnosis    10/14/2020 

10:30:00 AM [facility]

 

                    D51.0               Vitamin B12 deficiency anemia due to int

rinsic factor deficiency VITAMIN 

B12 DEFIC ANEMIA DUE TO INTRINSIC FACTOR DEFICIENCY Diagnosis                 10

/ 

10:30:00 AM [facility]

 

                    Z01.30              Encounter for examination of blood press

ure without abnormal findings 

ENCOUNTER FOR EXAM OF BLOOD PRESSURE W/O ABNORMAL Diagnosis                  11:20:00 

AM [facility]

 

             B37.3        Candidiasis of vulva and vagina CANDIDIASIS OF VULVA A

ND VAGINA Diagnosis    

2020 11:30:00 AM [facility]

 

                    Z12.11              Encounter for screening for malignant ne

oplasm of colon ENCOUNTER FOR 

SCREENING FOR MALIGNANT NEOPLASM OF COLON Diagnosis           2020 11:00:0

0 AM [facility]

 

                    Z87.19              Personal history of other diseases of th

e digestive system PERSONAL 

HISTORY OF OTHER DISEASES OF THE DIGESTIV Diagnosis           2020 11:30:0

0 AM [facility]

 

                    Z98.0               Intestinal bypass and anastomosis status

 INTESTINAL BYPASS AND ANASTOMOSIS

 STATUS             Diagnosis           2020 11:30:00 AM [facility]

l

 

                    K52.9               Noninfective gastroenteritis and colitis

, unspecified NONINFECTIVE 

GASTROENTERITIS AND COLITIS, UNSPECIF Diagnosis           2020 11:30:00 AM

 [facility]

 

                          K57.92                    Diverticulitis of intestine,

 part unspecified, without perforation or 

abscess without bleeding  DVTRCLI OF INTEST, PART UNSP, W/O PERF OR ABSCESS 

Diagnosis                 2020 11:30:00 AM [facility]

 

             R10.32       Left lower quadrant pain LEFT LOWER QUADRANT PAIN Diag

nosis    2020 

11:30:00 AM [facility]

 

           G89.29     [id number]   Other chronic pain Problem    2021 12:00:

00 AM EDT [facility] 

([facility])

 

           E78.00     [id number]   Hypercholesterolemia Problem    2021 12:0

0:00 AM EDT [facility] 

([facility])

 

             Z86.39       [id number]    History of non anemic vitamin B12 deficie

ncy Problem      2021

 12:00:00 AM EDT                        [facility] ([facility])

 

             R42          Dizziness and giddiness Dizziness and giddiness Proble

m      06/15/2021 12:00:00 

AM EDT                                  [facility])

 

             R55          Syncope and collapse Syncope and collapse Problem     

 06/15/2021 12:00:00 AM EDT

                                        [facility] ([facility])

 

           R00.2      Palpitations Palpitations Problem    06/15/2021 12:00:00 A

M EDT [facility] 

([facility])

 

             I25.2        Old myocardial infarction Old myocardial infarction Pr

oblem      06/15/2021 

12:00:00 AM EDT                         [facility] ([facility])

 

                    I45.2               Right bundle branch block AND left anter

ior fascicular block Right bundle 

branch block AND left anterior fascicular block Problem                   06/15/

2021 12:00:00 AM 

EDT                                     [facility] [facility]

 

             R94.31       Electrocardiogram abnormal Electrocardiogram abnormal 

Problem      06/15/2021 

12:00:00 AM EDT                         [facility] ([facility])

 

           E66.3      Overweight Overweight Problem    06/15/2021 12:00:00 AM ED

T [facility] 

([facility])

 

                    I25.10              Coronary arteriosclerosis after percutan

eous coronary angioplasty 

Coronary arteriosclerosis after percutaneous coronary angioplasty Problem       

            

06/15/2021 12:00:00 AM EDT              [facility] ([facility])

 

             Z71.3        Dietary management surveillance Dietary management jovanni

veillance Problem      

06/15/2021 12:00:00 AM EDT              [facility] ([facility])

 

             E78.2        Mixed hyperlipidemia Mixed hyperlipidemia Problem     

 06/15/2021 12:00:00 AM 

EDT                                     [facility] ([facility])

 

           R07.9      Chest pain Chest pain Problem    06/15/2021 12:00:00 AM ED

T [facility] 

([facility])

 

             I35.0        Aortic valve disorder Aortic valve disorder Problem   

   06/15/2021 12:00:00 AM

 EDT                                    [facility] ([facility])

 

             I10          Essential hypertension Essential hypertension Problem 

     06/15/2021 12:00:00 AM

 EDT                                    [facility] ([facility])

 

                    Z95.5               Patient post percutaneous transluminal c

oronary angioplasty Patient post 

percutaneous transluminal coronary angioplasty Problem                   06/15/2

021 12:00:00 AM 

EDT                                     [facility] ([facility])

 

                    Z95.1               History of coronary artery bypass grafti

ng History of coronary artery 

bypass grafting     Problem             06/15/2021 12:00:00 AM EDT [facility] ([facility]

 

           [id number]   Chest pain Chest pain Problem    06/15/2021 12:00:00 AM ED

T [facility] 

([facility])

 

             [id number]      Aortic valve disorder Aortic valve disorder Problem   

   06/15/2021 12:00:00 

AM EDT                                  [facility] ([facility])

 

             [id number]    Syncope and collapse Syncope and collapse Problem     

 06/15/2021 12:00:00 

AM EDT                                  [facility] ([facility])

 

                    [id number]            Right bundle branch block AND left anter

ior fascicular block Right 

bundle branch block AND left anterior fascicular block Problem                  

 06/15/2021 

12:00:00 AM EDT                         [facility] ([facility])

 

                    [id number]           Patient post percutaneous transluminal c

oronary angioplasty Patient 

post percutaneous transluminal coronary angioplasty Problem                   06

/15/2021 12:00:00 

AM EDT                                  [facility] ([facility])

 

           [id number]   Palpitations Palpitations Problem    06/15/2021 12:00:00 A

M EDT [facility] 

([facility])

 

           151872239  Overweight Overweight Problem    06/15/2021 12:00:00 AM ED

T [facility] 

([facility])

 

             1924010      Old myocardial infarction Old myocardial infarction Pr

oblem      06/15/2021 

12:00:00 AM EDT                         [facility] ([facility])

 

             [id number]    Mixed hyperlipidemia Mixed hyperlipidemia Problem     

 06/15/2021 12:00:00 

AM EDT                                  [facility] ([facility])

 

                    [id number]           History of coronary artery bypass grafti

ng History of coronary artery 

bypass grafting     Problem             06/15/2021 12:00:00 AM EDT [facility] ([facility])

 

             51284639     Essential hypertension Essential hypertension Problem 

     06/15/2021 

12:00:00 AM EDT                         [facility] ([facility])

 

             [id number]    Electrocardiogram abnormal Electrocardiogram abnormal 

Problem      

06/15/2021 12:00:00 AM EDT              [facility] ([facility])

 

             [id number]    Dizziness and giddiness Dizziness and giddiness Proble

m      06/15/2021 

12:00:00 AM EDT                         [facility] ([facility])

 

                [id number]       Dietary management surveillance Dietary manageme

nt surveillance 

Problem                   06/15/2021 12:00:00 AM [facility]T [facility]

 

                          [id number]         Coronary arteriosclerosis af

ter percutaneous coronary 

angioplasty               Coronary arteriosclerosis after percutaneous coronary 

angioplasty 

Problem                   06/15/2021 12:00:00 AM EDT [facility]

 

           R06.02     Dyspnea    Dyspnea    Problem    2021 12:00:00 AM ED

[facility] )

 

                G47.33          Obstructive sleep apnea syndrome Obstructive sle

ep apnea syndrome Problem

                          2021 12:00:00 AM EDT [facility] ([facility])

 

           Z87.891    Ex-smoker  Ex-smoker  Problem    2021 12:00:00 AM ED

[facility]

 

             I25.2        [id number]    History of MI (myocardial infarction) Pro

blem      2021 

12:00:00 AM EDT                         eCW1 ([facility])

 

             Z87.898      [id number]    History of solitary pulmonary nodule Prob

jody      2021 

12:00:00 AM EDT                         eCW1 ([facility])

 

             I10          [id number]     Essential (primary) hypertension Problem 

     2020 12:00:00 AM EDT

                                        eCW1 ([facility])



                                                                                
                                                                                
                                                                                
                                                                                
                                                                                
                                                                                
                                                                                
                                                                                
                                                                                
                                      



Surgeries/Procedures

          



             Procedure    Description  Date         Indications  Data Source(s)

 

             OFFICE OUTPATIENT VISIT 15 MINUTES              2021 12:00:00

 AM EDT              [facility] 

([facility])

 

             External ECG Rec>48HR<7D Recording              09/10/2021 12:00:00

 AM EDT              [facility] 

[facility])

 

             External ECG Rec>48HR<7D Review & Interpretation              09/10

/2021 12:00:00 AM EDT              

[facility] ([facility])

 

             ECHO TTHRC R-T 2D W/WOM-MODE COMPL SPEC&COLR DOP               12:00:00 AM EDT              

[facility] ([facility])

 

             Complex Chronic Care Management SVC 1St 60 Min              

021 12:00:00 AM EDT              

[facility] ([facility])

 

             Complex Chronic Care MGMT Service Ea Addl 30 Min               12:00:00 AM EDT              

[facility] ([facility])

 

             MYOCRD IMAGE PET PERFUS MULTPL STUDY REST/STRESS              08/10

/2021 12:00:00 AM EDT              

[facility] ([facility])

 

             CV STRS TST XERS&/OR RX CONT ECG I&R ONLY              08/10/2021 1

2:00:00 AM EDT              [facility] 

([facility])

 

                    Arterial Pressure Waveform Analysis For Assessment Of Centra

l Art                     07/15/2021 

12:00:00 AM EDT                                     [facility] ([facility]

 

             OFFICE OUTPATIENT VISIT 15 MINUTES              07/15/2021 12:00:00

 AM EDT              [facility] 

([facility])

 

             DUPLEX SCAN EXTRACRANIAL ART COMPL BI STUDY              2021

 12:00:00 AM EDT              

[facility] ([facility])

 

             ECG ROUTINE ECG W/LEAST 12 LDS W/I&R              06/15/2021 12:00:

00 AM EDT              [facility] 

([facility])

 

                    Arterial Pressure Waveform Analysis For Assessment Of Centra

l Art                     06/15/2021 

12:00:00 AM EDT                                     [facility] ([facility]

 

             OFFICE OUTPATIENT NEW 45 MINUTES              06/15/2021 12:00:00 A

M EDT              [facility] 

([facility])

 

                    Ultrasound procedure on topographic region (procedure)      

               2021 03:00:00 PM 

EDT                                                 [facility]

l

 

             Xray Wrist complete LT              2021 01:55:00 PM EDT     

         [facility]

 

             Xray Forearm AP/LAT LT              2021 01:55:00 PM EDT     

         [facility]

 

             Spirometry                2021 12:00:00 AM EDT              M

ELAINE ([facility], 

)

 

             CYSTOURETHROSCOPY              2020 12:00:00 AM EST          

    [facility] ([facility] [place])



                                                                                
                                                                                
                                                                                
                            



Results

          



                    ID                  Date                Data Source

 

                    [id number]            2021 10:19:00 AM EDT [facility] ([facility])









          Name      Value     Range     Interpretation Code Description Data Velia

rce(s) Supporting 

Document(s)

 

           Cobalamin (Vitamin B12) [Mass/volume] in Serum or Plasma 689 pg/mL  2

                          

[facility] ([facility])    

 

                                        VITAMIN B12 NORMAL RANGE



NORMAL                     247 - 911 PG/ML

INDETERMINATE              211 - 246 PG/ML

DEFICIENT              LESS THAN 211 PG/ML

 









                    ID                  Date                Data Source

 

                    [id number]            2021 10:19:00 AM EDT [facility] ([facility])









          Name      Value     Range     Interpretation Code Description Data Velia

rce(s) Supporting 

Document(s)

 

          Cholesterol Level 191 mg/dL                               [facility] ([facility])  

 

          Triglycerides Level 316 mg/dL                               [facility] ([facility]

[facility])  

 

          LDL Cholesterol 85 mg/dL                                [facility] ([facility]

[facility])  

 

          Non-HDL-C 148 mg/dL                               [facility]  

 

          HDL Cholesterol 43 mg/dL                                [facility] ([facility]

[facility])  

 

          Cholesterol Risk Ratio 4.441                                   [facility] 

([facility])  









                    ID                  Date                Data Source

 

                    VITAMIN B12 LEVEL   2021 12:00:00 AM EDT eCW1 ([facility])









          Name      Value     Range     Interpretation Code Description Data Velia

rce(s) Supporting 

Document(s)

 

                    [phone]             VITAMIN B12 LEVEL eCW1 ([facility])  









                    ID                  Date                Data Source

 

                    LIPID PANEL (CARDIAC RISK) 2021 12:00:00 AM EDT eCW1 (

[facility])









          Name      Value     Range     Interpretation Code Description Data Velia

rce(s) Supporting 

Document(s)

 

             Triglyceride [Mass/volume] in Serum or Plasma by calculation 316   

       <150                      

TRIGLYCERIDES LEVEL       eCW1 ([facility])  

 

           Cholesterol [Moles/volume] in Serum or Plasma 191        <200        

          CHOLESTEROL LEVEL eCW1 

([facility])         

 

             Cholesterol in LDL [Mass/volume] in Serum or Plasma by calculation 

85           <100                      LDL

 CHOLESTEROL              eCW1 ([facility])  

 

           Cholesterol in HDL [Moles/volume] in Serum or Plasma 43         >40  

                 HDL CHOLESTEROL 

eC1 ([facility])    

 

                    4.441     <5                  CHOLESTEROL RISK RATIO eCW ([facility])  

 

                    148                           NON-HDL-C eCW1 ([facility])  









                    ID                  Date                Data Source

 

                    [id number]              2021 11:35:00 AM EDT [facility] ([facility])









          Name      Value     Range     Interpretation Code Description Data Velia

rce(s) Supporting 

Document(s)

 

           Carotid Ultrasound Bilateral Laboratory test result                  

                [facility] ([facility])                         









                    ID                  Date                Data Source

 

                    [id number]        2021 03:42:00 PM EDT [facility] [address] [place]

[place] [address]                

                                  [phone]  NAME                          
                    SEX    PT STATUS         ACCOUNT NUMBER  LORI TOWNSEND   [facility] ER               [id number]    ORDERING
 PHYSICIAN                                LOCATION                 MEDICAL 
RECORD NO.  Joselito Vicente DO                                   ER                
       [id number]    ATTENDING PHYSICIAN                               DATE OF 
BIRTH            DATE OF EXAM/TIME  UMAIR MARTINEZ                        
      1945    TYPE / EXAM  US EXTREMITY NON 
VASC LIMITED    REASON FOR EXAM  L wrist- ulnar aspect, ? large ganglion causing
 pn  Clinical History/Indication for Exam:   L wrist- ulnar aspect, ? large 
ganglion causing pn      US LEFT UPPER EXTREMITY NON-VASCULAR COMPLETE      
INDICATION:  L wrist- ulnar aspect, ? large ganglion causing pn      TECHNIQUE: 
 Real-time ultrasound scan of the left upper extremity with image documentation.
      COMPARISON:  Left wrist x-rays from 2021      FINDINGS:      Soft 
tissues:  Ultrasound of the soft tissues was performed in the area of concern.  
Along the ulnar aspect of the wrist at the site of the palpable soft tissue lump
 there is a lobulated hypoechoic cystic and septated structure measuring 1.6 x 
0.8 x 1.1 cm.  No internal vascularity or associated peripheral vascularity is 
seen.  No foreign body.      IMPRESSION:        1.  Findings may reflect a 
ganglion cyst or synovial cyst along the ulnar side of the wrist at the site of 
the palpable lump measuring up to 1.6 cm.   2.  Consider follow-up nonemergent 
MRI of the wrist.            ** REPORT SIGNATURE ON FILE  2021 (15:42 
Eastern Time ) **   Signed by: Kishan East M.D.         Reported By 
Kishan East MD on 21       Signed By Kishan East MD on 
21                                                    
______________________________________________   _______  _______               
                                                                          Date  
      Time  CC:   LUIGI MARTINEZ; Kishan East MD  Techn: TABSA       
      Trans Dt/Tm:             Trans by: DT             Prt Dt/Tm:    :
 Total DLP =    0.00 mGy-cm     : Total Radiation Dose =    0.0000 mSv 
   Lifetime Dose: 0 mSv   









          Name      Value     Range     Interpretation Code Description Data Velia

rce(s) Supporting 

Document(s)

 

                                                                       









                    ID                  Date                Data Source

 

                    [id number]           2021 03:31:00 PM EDT [facility]

 

                                                                             ED 

Physician Documentation      NAME:      

           LORI TOWNSEND                      :                  1945
   ACCT#:                [id number]                      AGE:                 
 75           MR#:                  [id number]                       SERVICE 
DATE:           21     EMERGENCY DR:           Joselito Vicente DO             
                                            PRIMARY CARE DR:           UMAIR MARTINEZ PA-C                      ROOM#:                               HPI 
(Adult, General)  General  Chief Complaint: Musculoskeletal  Stated Complaint: L
 ELBOW/WRIST PAIN  Time Seen by Provider: 21 13:20  History of Present 
Illness Narrative: 75-year-old female with chief complaint of left wrist pain 
ongoing for a week or more withNoSignificant impairment with the exception of 
left fist unable to befully made because of pain, no sensory deficits, no 
similar issues in the past, was previously swollen more than it is now and 
spontaneously resolved for the past couple days.  She has mentioned it to her 
primary care physician but is apparently switching primary care physicians as 
she was unhappy with the care she was previously getting.Denies any other 
complaints.  Allergies/Home Meds                                                
Allergies        Allergy/AdvReac Type Severity Reaction Status Date / Time     
latex [Latex] Allergy Unknown  Verified 21 13:48     amoxicillin 
[Amoxicillin] AdvReac Intermediate Diarrhea Verified 21 13:48     
atorvastatin [From Lipitor] AdvReac Intermediate LEG CRAMPS Verified 21 
13:48       BAD       codeine [Codeine] AdvReac Intermediate ITCHING Verified 
21 13:48       AND NAUSEA       nitroglycerin AdvReac Intermediate 
MIGRAINE Verified 21 13:48    [From Nitro-Bid]   HEADACHES       
tetracycline [Tetracycline] AdvReac Intermediate Diarrhea Verified 21 
13:48     buspirone [Buspirone] AdvReac Mild DIARRHEA Verified 21 13:48   
  Adhesive Tape AdvReac Intermediate RASH, RAW, Uncoded 21 13:39       
VERY SORE                                                    Home Medications   
      Medication  Instructions  Recorded  Confirmed  Last Taken  Type     
aspirin 325 mg PO DAILY #30 tab 14 History     
clopidogrel [Plavix] 75 mg PO DAILY #30 tab 14 History  
   meclizine 25 mg PO QID #120 tab 14 History     
metoclopramide HCl 10 mg PO QID #120 tab 14 History     
metoprolol tartrate 25 mg PO BID #60 tab 14 History     
nitroglycerin 1 patch TRANSDERMAL DAILY #30 patch 14 Unknown 
History     pantoprazole [Protonix] 40 mg PO DAILY #30 tab 14 
Unknown History     ranolazine [Ranexa] 1,000 mg PO BID #60 tab 14 History     tramadol 2 tabs PO Q6HPRN #100 tab 14 History     albuterol sulfate [Proventil HFA] 90 mcg 
INHALATION PRN PRN 21 History     amlodipine [Norvasc] 
2.5 mg PO DAILY 21 History     escitalopram oxalate 10 
mg PO DAILY 21 History     febuxostat 40 mg PO DAILY 
21 History     valsartan [Diovan] 80 mg PO DAILY 
21 History          PMH (from Triage)  Patient Medical 
History  PMH Reviewed/Updated as Needed: Yes  PMH/PSH from Triage:              
         Medical History    Angina pectoris (Medical)   Carotid artery stenosis 
(Medical)   Coronary artery disease (Medical)   Eczema (Medical)   Endometriosis
 (Medical)   Esophagitis (Medical)   Fibromyalgia (Medical)   Gastrointestinal 
hemorrhage (Medical)   Heart failure (Medical)   Heart valve disorder (Medical) 
  Herniated discs in back (Medical)   Hiatal hernia (Medical)   History of MI 
(Medical)   Hyperlipidemia (Medical)   Hypertension (Medical)   Irritable colon 
(Medical)   Kidney stone (Medical)   Paget's disease of bone (Medical)   
Peripheral vascular disease (Medical)   Sleep apnea (Medical)   Stroke in 2014
 (Medical)   Transient ischemic attack (Medical)                          
Surgical History (Updated 19 @ 11:52 by [employer] [place])    Appendectomy (Surg
ical)   Carotid endarterectomy (Surgical)      Right      Left 2014  
Cataract extraction and insertion of intraocular lens (Surgical)      Both eyes 
 Cholecystectomy (Surgical)   History of - surgery (Surgical)      Multiple 
stents  History of - surgery (Surgical)   History of - surgery (Surgical)      
Discectomy L4-L5  History of - surgery (Surgical)      Valve replacement  
History of hysterectomy (Surgical)   Status post coronary artery bypass graft 
(Surgical)      3 vessel in   Status post tonsillectomy (Surgical)       Hx 
Drug Resistant Infections  Hx C.Diff: Yes (17)  Hx Other Resistant 
Infection?: No  Isolation: Standard precautions  Hx Recent Travel  Nurse 
screening for coronavirus:                          Recent Travel outside the   
   No                                                    country (where)        
            Has patient experienced        No                                   
                 coronavirus symptoms                    Social History  Are you
 in a relationship with/Does anyone hit you, yell/swear at you, steal from you?:
 No  Substance Use  Second Hand Smoke Exposure: No  Smoking Status: Never smoker
  Vaccination History  Hx/Date of Tetanus, Diphtheria Vaccination: Yes  Hx/Date 
of Influenza Vaccination: No  Hx/Date of Pneumococcal Vaccination: No  
Immunizations Up to Date: Yes    Count includes the [facility]  Medical History (Reviewed 21 @ 
13:47 by Carmen Griffin RN)    Angina pectoris  Carotid artery stenosis  
Coronary artery disease  Eczema  Endometriosis  Esophagitis  Fibromyalgia  
Gastrointestinal hemorrhage  Heart failure  Heart valve disorder  Herniated 
discs in back  Hiatal hernia  History of MI  Hyperlipidemia  Hypertension  
Irritable colon  Kidney stone  Paget's disease of bone  Peripheral vascular 
disease  Sleep apnea  Stroke in 2014  Transient ischemic attack      Surgical 
History (Reviewed 21 @ 13:47 by Carmen Griffin RN)    Appendectomy  
Carotid endarterectomy  Cataract extraction and insertion of intraocular lens  
Cholecystectomy  History of - surgery  History of - surgery  History of - 
surgery  History of - surgery  History of hysterectomy  Status post coronary 
artery bypass graft  Status post tonsillectomy      Family History  Mother      
No problems noted.    Father         No problems noted.    Brother      
No problems noted.    Sister      No problems noted.    Sister      No problems 
noted.                                                   Social History  Does 
the Patient have a Healthcare Proxy:  Yes   Does Patient have a DNR?:  No   Does
 Patient have a Living Will?:  No   Smoking Status:  Never smoker         ROS  
Review of Systems  ROS Narrative: General: Denies fever, chills.  Head: Denies 
headache, scalp tenderness.  Eyes: Denies blurry vision, double vision.  Ears: 
Denies earache, discharge.  Nose: Denies nosebleeds, discharge.  Neck: Denies 
pain, masses.  Heart: Denies chest pain, palpitations.  Lungs: Denies shortness 
of breath, orthopnea, cough.  GI: Denies nausea, vomiting, diarrhea, constipat
ion  : Denies dysuria, hematuria.  MUSK: Denies weakness, atrophy.Endorses 
pain in the left wrist  Skin: Denies rash, itching.  Neurologic: Denies 
numbness, tingling.  Psychiatric: Denies depression, anxiety.    Physical Exam  
General  Physical Exam Narrative: General: Patient is alert and oriented to 
person, place, and time. Appears in no acute distress.  Head: Normocephalic and 
atraumatic.  Eyes: Pupils equally round and react to light. Extraocular 
movements intact.  Ears: Gross hearing intact. External ears within normal 
limits.  Nose: No septal deviation. Nasal passages clear.  Throat: Moist oral 
mucosa. No erythema or exudate of the pharynx.  Neck: Supple. Trachea midline.  
Heart: Regular   Lungs: Clear to auscultation bilateral.  Abdomen: Soft, non-
tender, non-distended, and bowel sounds are present.  Genitourinary: Deferred.  
Rectal: Deferred.  Extremities: No cyanosis, clubbing, or edema.  Skin: Warm and
 dry.  Musculoskeletal:Noting on the left Volar ulnar wrist and approximate 2 x 
2 centimeter structure that is mildly tender to palpation and somewhat firm, 
nonmobile, reminiscent of ganglion structure, also noting a mild deformity in 
the mid left forearm which is chronic secondary to an old surgical procedure, no
 significant range of motion impairment with the exception of patient inability 
to make a full fist with her left hand given pain in that area previously 
described.  Normal sensory and capillary refill.  Neurologic: Cranial nerves II 
through XII are grossly intact. Sensation to light touch is intact.  
Psychiatric: Judgment and insight are seemingly intact. Mood and affect are 
appropriate for the situation.  Vital Signs  Vital Signs:                       
          Vital Signs         21  12:56     Temperature 98.3 F     Pulse 
Rate 68     Respiratory Rate 16     Blood Pressure 136/78     O2 Sat by Pulse 
Oximetry 97          MDM (comprehensive)  Medical Decision Making  Free 
Text/Narative:: 75-year-old female with chief complaint of left wrist pain 
ongoing for at least a few days that radiates up the arm intermittently but has 
improved slightly over the past 1 to 2 days with a notable swelling in the left 
ulnar volar surface of the wrist that is mildly tender to palpation, x-ray is 
reassuring, ultrasound notable for likely ganglionic structure, I recommend that
 she follow-up with either a primary care physician who is comfortable draining 
ganglion cysts or orthopedics to discuss drainage and or potential further work-
up to include MRI if indicated although at this time I feel MRI would be not 
warranted.  Discussed return precautions with patient.    Discharge Plan  
Admission/Discharge Dx  Primary DC Diagnosis: L wrist pain      ED Provider: 
Joselito Vicente    ED Status: Discharged    Time Seen by Provider: 21 13:20   
 Triaged At: 21 12:56    Condition  Condition: Stable    Discharge Detail 
 Disposition: Home, Self-Care    Med Rec   New Prescriptions:  No Action    
valsartan [Diovan] 80 mg tablet      80 mg PO DAILY RF: 0    amlodipine 
[Norvasc] 2.5 mg tablet      2.5 mg PO DAILY RF: 0    albuterol sulfate 
[Proventil HFA] 90 mcg/actuation HFA aerosol inhaler      90 mcg INHALATION PRN 
PRN (Reason: asthma) RF: 0    escitalopram oxalate 10 mg Tablet      10 mg PO 
DAILY RF: 0    febuxostat 40 mg Tablet      40 mg PO DAILY RF: 0    
nitroglycerin 1 EACH patch 24 hour      1 patch Transdermal DAILY Qty: 30 RF: 5 
  aspirin 325 MG tablet      325 mg PO DAILY Qty: 30 RF: 5    clopidogrel 
[Plavix] 75 MG tablet      75 mg PO DAILY Qty: 30 RF: 5    tramadol 50 MG tablet
      2 tabs PO Q6HPRN Qty: 100 RF: 0    pantoprazole [Protonix] 40 MG 
tablet,delayed release (DR/EC)      40 mg PO DAILY Qty: 30 RF: 5    
metoclopramide HCl 10 MG tablet      10 mg PO QID Qty: 120 RF: 5    meclizine 25
 MG tablet,chewable      25 mg PO QID Qty: 120 RF: 5    metoprolol tartrate 25 
MG tablet      25 mg PO BID Qty: 60 RF: 5    ranolazine [Ranexa] 1,000 MG tablet
 extended release 12 hr      1,000 mg PO BID Qty: 60 RF: 5    Follow Up 
Visit/Referrals:  Torrey Blackburn MD [PHYSICIAN] -     Follow Up Care/Instructions  
Diet/Activity/Wound Care..:  As discussed, Your ultrasound is still pending but 
your x-rays were normal.  There may be a component of a ganglion cyst causing yo
ur pain.  I would recommend following up with either your primary care physician
comfortable with potential drainage of ganglion cysts or an orthopedic group to 
further evaluate.  By then your ultrasound results should be backAnd they can 
review the images themselves.    Feel free to use compressive bandages, do 
regular stretching, and use Tylenol as needed for pain.    *Discharge Patient*  
Discharge Orders:  Discharge Order  (Routine); Ordered 21     Ordered By: 
Joselito Vicente    Discharge Date/Time: 21 15:40    Interventions  
Interventions:  ED Discharge Instructions   Last Done: 21 15:40  ED 
Musculoskeletal   Last Done: 21 13:46          Report Signers:  
<Electronically signed by Joselito Vicente DO>      Joselito Vicente DO      21 1854
    _________________________________________________         Joselito Vicente DO    
   SIGNATURE   DA    Report Cosigners:                                          
   D:  TEVIN  21  1531 T:  TEVIN    21  1531  CC:   LUIGI MARTINEZ         









          Name      Value     Range     Interpretation Code Description Data Velia

rce(s) Supporting 

Document(s)

 

                                                                       









                    ID                  Date                Data Source

 

                    [id number]        2021 02:43:00 PM EDT [facility] [address]

[place] [id number] [phone]  NAME                           
                  SEX    PT STATUS         ACCOUNT NUMBER  LORI TOWNSEND   REG ER               [id number]    ORDERING 
PHYSICIAN                                LOCATION                 MEDICAL RECORD
NO.  Joselito Vicente DO                                   ER                       
[id number]    ATTENDING PHYSICIAN                               DATE OF BIRTH   
        DATE OF EXAM/TIME  UMAIR MARTINEZ                              
[birth year]    TYPE / EXAM  Xray Forearm AP/LAT LT 
  REASON FOR EXAM  L distal forearm/wrist pain w/ pronation  Clinical 
History/Indication for Exam:   L distal forearm/wrist pain w/ pronation      
Left forearm x-ray 2 views.      Indication: Left distal forearm pain.      
Comparison: None available.      Findings: AP and lateral views of the left 
forearm show no fracture or dislocation.  Mild soft tissue swelling is seen in 
the distal forearm.  Mild joint space narrowing is seen in the radiocarpal 
joint.      Impression:       1.  No fracture is seen.   2.  Mild radiocarpal 
joint osteoarthritis.   3.  Distal forearm soft tissue swelling.      ** REPORT 
SIGNATURE ON FILE  2021 (14:43 Eastern Time ) **   Signed by: TIM Davenport        Reported By Ebenezer Jett MD on 21      Signed By Ebenezer Jett MD
on 21                          
______________________________________________   _______  _______  Date        
Time  CC:   LUIGI MARTINEZ; Ebenezer Jett MD  Techn: PELBU             Trans 
Dt/Tm:             Trans by: DT             Prt Dt/Tm:    0990-4568: Total DLP =
   0.00 mGy-cm  Fluoroscopy Time (in secs):    









          Name      Value     Range     Interpretation Code Description Data Velia

rce(s) Supporting 

Document(s)

 

                                                                       









                    ID                  Date                Data Source

 

                    [id number]        2021 02:41:00 PM EDT [facility] [address] [place] [address]                

                                 [phone]  NAME                           
                  SEX    PT STATUS         ACCOUNT NUMBER  LORI TOWNSEND   [facility] ER               [id number]    ORDERING 
PHYSICIAN                                LOCATION                 MEDICAL RECORD
NO.  Joselito Vicente DO                                   ER                       
[id number]    ATTENDING PHYSICIAN                               DATE OF BIRTH   
        DATE OF EXAM/TIME  UMAIR MARTINEZ                              
1945    TYPE / EXAM  Xray Wrist complete LT 
  REASON FOR EXAM  radial pain in L wrist ++w/ pronation,?ganlion  Clinical 
History/Indication for Exam:   radial pain in L wrist ++w/ pronation,?ganlion   
  LEFT WRIST X RAY 4 VIEWS:       Indication: Radial wrist pain.      
Comparison: None available.      Findings: Frontal, lateral, and 2 oblique views
of the left wrist show no acute fracture or dislocation.  Small well-corticated 
osseous body is seen at the ulnar styloid likely representing a healed old 
avulsion fracture or an ossicle.  Prior resection of the trapezium is seen.  
Mild diffusesoft tissue swelling is identified.  Mild joint space narrowing is 
present in the radiocarpal joint.      IMPRESSION:       1.  No acute fracture 
is identified.   2.  Mild radiocarpal joint osteoarthritis.   3.  Prior 
resection of the trapezium.   4.  Mild diffuse soft tissue swelling is present. 
    ** REPORT SIGNATURE ON FILE  2021 (14:41 Eastern Time ) **   Signed 
by: Ebenezer Jett M.D.        Reported By Ebenezer Jett MD on 21      Signed
By Ebenezer Jett MD on 21                          
______________________________________________   _______  _______  Date        
Time  CC:   LUIGI MARTINEZ; Ebenezer Jett MD  Techn: PELBU             Trans 
Dt/Tm:             Trans by: DT             Prt Dt/Tm:    8025-6948: Total DLP =
   0.00 mGy-cm  Fluoroscopy Time (in secs):    









          Name      Value     Range     Interpretation Code Description Data Velia

rce(s) Supporting 

Document(s)

 

                                                                       









                    ID                  Date                Data Source

 

                    [id number]         2021 12:57:00 PM EDT [facility] ([facility])









          Name      Value     Range     Interpretation Code Description Data Velia

rce(s) Supporting 

Document(s)

 

          PDFReport Laboratory test result                               [facility] 

([facility])  

 

          FVC-Pre   2.07 L                                  [facility] ([facility])  

 

          FVC-%Pred-Pre 87 L                                    [facility] ([facility])  

 

          FVC-Pred  2.38 L                                  [facility] ([facility])  

 

          Fev1-Pred 1.76 L                                  [facility] ([facility])  

 

          FVC-LLN   1.75 L                                  [facility] ([facility])  

 

          Fev1-Pre  1.64 L                                  [facility])  

 

          Fev1-%Pred-Pre 92 L                                    [facility] ([facility])  

 

          Fev1-LLN  1.23 L                                  [facility] ([facility])  

 

          Fev6-Pred 2.25 L                                  [facility])  

 

          Fev6-%Pred-Pre 92 L                                    [facility] ([facility])  

 

          Fev6-Pre  2.07 L                                  [facility])  

 

          Fev6-LLN  1.63 L                                  [facility])  

 

          Fev1fvc-Pre 79 %                                    [facility] ([facility])  

 

          Fev1fvc-Pred 74 %                                    [facility])  

 

          Hyl1ddx-LCX 65 %                                    [facility] ([facility])  

 

          Fev6fvc-Pred 95 %                                    [facility])  

 

          Cnl6mjg-%Pred-Pre 106 %                                   [facility])  

 

          Fev6fvc-Pre 100 %                                   [facility])  

 

          Uqc6ayy-%Pred-Pre 105 %                                   [facility] ([facility])  

 

          FEFMax-Pred 4.62 L/E/sec                               [facility] ([facility])  

 

          FEFMax-Pre 4.56 L/E/sec                               [facility] ([facility])  

 

          FEFMax-LLN 3.06 L/E/sec                               [facility])  

 

          FEFMax-%Pred-Pre 98 L/E/sec                               [facility] ([facility])  

 

          Fef2575-Pred 1.39 L/E/sec                               [facility])  

 

          Fef2575-Pre 1.47 L/E/sec                               [facility] ([facility])  

 

          ExpTime-Pre 4.27 sec                                [facility] ([facility])  

 

          Brl8223-%Pred-Pre 105 L/E/sec                               [facility] ([facility])  

 

          Hyz4664-UNG 0.26 L/E/sec                               [facility] ([facility])  

 

          Fev1fev6-Pre 79 %                                    [facility] ([facility], )  

 

          Fev1fev6-Pred 78 %                                    [facility] )  

 

          Tto8wlm2-BMG 69 %                                    [facility] ([facility], )  

 

          Zcx3xbc9-%Pred-Pre 101 %                                   [facility] ([facility], )  









                    ID                  Date                Data Source

 

                    [id number]       2021 08:15:00 PM EDT [facility]

 

                                          Patient: LORI TOWNSEND Observation 

Report - Physicians/Mid Levels MRN: 

[id number].VisitID: [id number] [place] [address] [phone], FRegistration Date/Time: 2021 14:04 Weight:75.7 kg 
(S). Height/Length:61 inches (S). BMI:31.5        PAST HISTORYProblems:C. 
Difficile Colitis.Diverticulosis.Fibromyalgia.Bulging 
disc.Esophagitis.Nephrolithiasis.Myocardial 
Infarction.Gout.Diverticulitis.Diabetes Mellitus.Hypertension.Renal 
cancer.Gastroesophageal Reflux.Hyperlipidemia.Sleep Apnea.Dizziness 
[Intermittent].   Additional Surgeries:Appendectomy.Back Surgery.Bladder 
Suspension.Carotid Surgery.Cholecystectomy.Coronary Artery Bypass 
Graft.Hysterectomy.Lithotripsy.Nephrectomy.Partial colectomy with anastomosis.  
 Medications:Pantoprazole Sodium Oral 40 mg,  daily, last dose  
3/23/2021.Escitalopram Oxalate Oral 10 mg,  daily, last dose  3/23/2021.Repatha 
Subcutaneous (Solution Prefilled Syringe 140 mg/mL),  2x a day, last dose  
3/23/2021.Clopidogrel Bisulfate Oral 75 mg,  daily, last dose  
3/23/2021.Valsartan Oral 80 mg,  2x a day, last dose  3/23/2021.Meclizine HCl 
Oral 25 mg,  daily as needed, last dose  unknown last dose.Nitroglycerin 0.4 mg,
  as needed, last dose  unknown last dose.Percocet Oral 5/325 mg,  q6h as 
needed, last dose  unknown last dose.Klor-Con M10 Oral,  daily, last dose  
3/23/2021.Vitamin D3 Oral (Tablet 250 MCG (54025 UT)),  daily, last dose  
3/23/2021.Probiotic Daily Oral,  daily, last dose  3/23/2021.Tramadol HCL Oral 
50 mg,  q6h as needed, last dose  unknown last dose.Allopurinol Oral 300 mg,  
daily, last dose  3/23/2021.Metoprolol Tartrate Oral 50 mg,  2x a day, last dose
  3/23/2021.Ranolazine ER Oral (Tablet Extended Release 12 Hour 1000 mg) 1 
tablet,  2x a day, last dose  3/23/2021.Aspirin Oral (Tablet 325 mg) 1 tablet,  
daily, last dose  3/23/2021.  Allergies:Bactrim.(rash)Butabarbital 
Sodium.(hives)Codeine.(itching)Divalproex Sod.(nausea) 
(shakey)Erythromycin.(diarrhea)Tetracycline.(diarrhea).   FAMILY HISTORYNegative
. No significant family medical history.        (Electronically signed by RAI Lama 2021 19:55)   









          Name      Value     Range     Interpretation Code Description Data Velia

rce(s) Supporting 

Document(s)

 

                                                                       









                    ID                  Date                Data Source

 

                    [id number]  2021 05:38:00 PM EDT [facility]

l

 

                                        TSYSORDER [id number] 









          Name      Value     Range     Interpretation Code Description Data Velia

rce(s) Supporting 

Document(s)

 

          TROPONIN I < 0.017 ng/mL 0.000-0.056                     [facility]

l  









                    ID                  Date                Data Source

 

                    [id number]       2021 03:49:00 PM EDT [facility]

l

 

                                        DATE OF EXAMINATION: 3/23/2021 14:25 EDT

  CHEST 1 VIEW  HISTORY: Chest pain  

TECHNIQUE: Single frontal radiograph of chest COMPARISON: CT scan of 2019 
FINDINGS: No evidence of focal consolidation, pneumothorax or large pleural 
effusion.Lungs are clear. Mediastinal structures are unremarkable. No aggressive
 osseouslesions. Multiple midline sternotomy sutures, mediastinal staples as 
well ascoronary arterial stents are noted. IMPRESSION: No focal consolidation. 
Electronically signed in  by: Laura Nunez M.D. 3/23/2021 15:43 EDT  









          Name      Value     Range     Interpretation Code Description Data Velia

rce(s) Supporting 

Document(s)

 

                                                                       









                    ID                  Date                Data Source

 

                    [id number]   2021 02:59:00 PM EDT [facility]

l

 

                                        TSYSORDER [id number] 









          Name      Value     Range     Interpretation Code Description Data Velia

rce(s) Supporting 

Document(s)

 

          FREE T4   1.0 ng/dL 0.76-1.46                     [facility]  









                    ID                  Date                Data Source

 

                    [id number]   2021 02:59:00 PM EDT [facility]

l

 

                                        TSYSORDER [id number] 









          Name      Value     Range     Interpretation Code Description Data Velia

rce(s) Supporting 

Document(s)

 

          TSH       2.105 uIU/mL 0.360-3.740                     [facility] 

 









                    ID                  Date                Data Source

 

                    [id number]     2021 02:53:00 PM EDT [facility]

l

 

                                        TSYSORDER [id number]

R [id number] 









          Name      Value     Range     Interpretation Code Description Data Velia

rce(s) Supporting 

Document(s)

 

          MAGNESIUM 1.8 mg/dL 1.8-2.4                       [facility]  









                    ID                  Date                Data Source

 

                    [id number]  2021 02:53:00 PM EDT [facility]

l

 

                                        TSYSORDER [id number]

R [id number] 









          Name      Value     Range     Interpretation Code Description Data Velia

rce(s) Supporting 

Document(s)

 

          TROPONIN I < 0.017 ng/mL 0.000-0.056                     [facility]

  









                    ID                  Date                Data Source

 

                    [id number]    2021 02:53:00 PM EDT [facility]

l

 

                                        TSYSORDER [id number]

R [id number] 









          Name      Value     Range     Interpretation Code Description Data Velia

rce(s) Supporting 

Document(s)

 

          LIPASE    192 U/L                           [facility]  









                    ID                  Date                Data Source

 

                    [id number]    2021 02:53:00 PM EDT [facility]

l

 

                                        TSYSORDER [id number]

R [id number] 









          Name      Value     Range     Interpretation Code Description Data Velia

rce(s) Supporting 

Document(s)

 

          GLUCOSE   96 mg/dL                          [facility]  

 

          BLOOD UREA NITROGEN 30 mg/dL  7-18      H                   [facility]

ital  

 

          CREATININE 1.69 mg/dL 0.6-1.0   H                   [facility]  

 

          SODIUM    140 mmol/L 136-145                       [facility]  

 

          POTASSIUM 4.4 mmol/L 3.5-5.1                       [facility]  

 

          CHLORIDE  105 mmol/L                         [facility]  

 

          CO2       24 mmol/L 21-32                         [facility]  

 

          CALCIUM   9.5 mg/dL 8.5-10.1                      [facility]  

 

          ANION GAP 11.0 mmol/L 5-12                          [facility]  

 

          GLOMERULAR FILTRATION RATE 29 mL/min                               [facility]  

 

                                        GFR IS CALCULATED IN mL/min/1.73m2 ABRAHAM

L FUNCTION:                          

>90MILDLY DECREASED:                        60-89MILDY TO MODERATELY DECREASED: 
          45-59        MODERATELY TO SEVERELY DECREASED:        30-44SEVERELY 
DECREASED:                      15-29RENAL FAILURE:                            
<15 

 

          AST       18 U/L    15-37                         [facility]  

 

          ALT       31 U/L    12-78                         [facility]  

 

          ALKALINE PHOSPHATASE 52 U/L                            [facility]

pital  

 

          TOTAL BILIRUBIN 0.4 mg/dL 0.2-1.0                       [facility]

  

 

          TOTAL PROTEIN 7.8 g/dl  6.4-8.2                       [facility]  

 

          ALBUMIN   4.1 gm/dL 3.4-5.0                       [facility]  









                    ID                  Date                Data Source

 

                    [id number]   2021 02:49:00 PM EDT [facility]

 

                                        TSYSORDER [id number] 









          Name      Value     Range     Interpretation Code Description Data Veila

rce(s) Supporting 

Document(s)

 

          PARTIAL THROMBOPLASTIN TIME 22.8 SECONDS 21.2-27.3                    

 [facility]  









                    ID                  Date                Data Source

 

                    [id number]    2021 02:49:00 PM EDT [facility]

 

                                        TSYSORDER [id number] 









          Name      Value     Range     Interpretation Code Description Data Velia

rce(s) Supporting 

Document(s)

 

          PROTHROMBIN TIME (PATIENT) 10.4 SECONDS 9.1-11.6                      

[facility]  

 

          INR       1.00      0.87-1.06                     [facility]  









                    ID                  Date                Data Source

 

                    [id number]   2021 02:32:00 PM T [facility]

 

                                        TSYSORDER [id number] 









          Name      Value     Range     Interpretation Code Description Data Velia

rce(s) Supporting 

Document(s)

 

          WHITE BLOOD COUNT 8.1 K/mm3 4.0-10.0                      [facility]

al  

 

          RED BLOOD COUNT 3.05 M/mm3 4.00-5.50 L                   [facility]  

 

          HEMOGLOBIN 11.0 gm/dL 12.0-16.0 L                   [facility]  

 

          HEMATOCRIT 32.4 %    36.0-48.8 L                   [facility]  

 

          MEAN CELL VOLUME 106.2 fl  80-96     *H                  [facility]  

 

          MEAN CORPUSCULAR HEMOGLOBIN 36.1 pg   27.0-31.0 H                   [facility]  

 

          MEAN CORPUSCULAR HGB CONC 34.0 g/dl 32.0-36.0                     [facility]  

 

          RED CELL DISTRIBUTION WIDTH 13.0 %    10.0-14.5                     [facility]  

 

          PLATELET COUNT 173 K/mm3 172-450                       [facility] 

 

 

          MEAN PLATELET VOLUME 10.4 fl   9.0-13.0                      [facility]

pital  

 

          GRAN %    58.6 %    50-80.0                       [facility]  

 

          IG%       1.0 %     0.0-0.2   H                   [facility]  

 

          LYMPH %   27.9 %    25.0-50.0                     [facility]  

 

          MONO %    12.2 %    2.0-10.0  H                   [facility]  

 

          EOS %     0.1 %     0-5.0                         [facility]  

 

          BASO %    0.2 %     0.0-2.0                       [facility]  

 

          GRAN #    4.7 K/mm3 2.0-8.00                      [facility]  

 

          IG#       0.1 K/mm3 0.0-0.2                       [facility]  

 

          LYMPH #   2.3 K/mm3 1.0-5.0                       [facility]  

 

          MONO #    1.0 K/mm3 0.10-1.20                     [facility]  

 

          EOS #     0.0 K/mm3 0.0-0.5                       [facility]  

 

          BASO #    0.0 K/mm3 0.0-0.2                       [facility]  









                    ID                  Date                Data Source

 

                    [id number]            2021 12:48:00 PM EDT [facility] ([facility]

[facility])









          Name      Value     Range     Interpretation Code Description Data Velia

rce(s) Supporting 

Document(s)

 

          Thyroid Stimulating Hormone 2.105                                   ME

[facility] ([facility])  









                    ID                  Date                Data Source

 

                    [id number]            2021 12:48:00 PM EDT [facility] ([facility]

[facility])









          Name      Value     Range     Interpretation Code Description Data Velia

rce(s) Supporting 

Document(s)

 

          Red Blood Count 3.05                                    [facility] ([facility]

[facility])  

 

          White Blood Count 8.1                                     [facility] (Card

[facility])  

 

          Hemoglobin 11.0                                    [facility] ([facility])  

 

          Platelets 173                                     [facility] ([facility])  

 

          Hematocrit 32.4                                    [facility] ([facility])  









                    ID                  Date                Data Source

 

                    [id number]            2021 12:48:00 PM EDT [facility] ([facility]

[facility])









          Name      Value     Range     Interpretation Code Description Data Velia

rce(s) Supporting 

Document(s)

 

             Alanine aminotransferase [Enzymatic activity/volume] in Serum or Pl

asma 31                                     

                          [facility] ([facility])  

 

           Albumin [Mass/volume] in Serum or Plasma 4.1                         

                [facility] ([facility])                       

 

           Calcium [Mass/volume] in Serum or Plasma 9.5                         

                [facility] ([facility])                       

 

           Chloride [Moles/volume] in Serum or Plasma 105                       

                  [facility] ([facility])                       

 

           Carbon dioxide, total [Moles/volume] in Serum or Plasma 24           

                               [facility] 

([facility])           

 

           Alkaline phosphatase [Enzymatic activity/volume] in Serum or Plasma 5

2                                          

[facility] ([facility])    

 

           Potassium [Moles/volume] in Serum or Plasma 4.4                      

                   [facility] ([facility])                       

 

           Protein [Mass/volume] in Serum or Plasma 7.8                         

                [facility] ([facility])                       

 

          Sodium    140                                     [facility] ([facility]  

 

                Aspartate aminotransferase [Enzymatic activity/volume] in Serum 

or Plasma 18                              

                                        [facility] ([facility])  

 

          Glucose   96                                      [facility] ([facility]  

 

           Urea nitrogen [Mass/volume] in Serum or Plasma 30                    

                      [facility] ([facility])                       

 

          Creatinine For GFR 1.69                                    [facility] ([facility]

[facility])  









                    ID                  Date                Data Source

 

                    [id number]         2020 10:42:00 AM EST [facility] ([facility] [place]









          Name      Value     Range     Interpretation Code Description Data Velia

rce(s) Supporting 

Document(s)

 

           BodySite   Laboratory test result                                  ME

[facility] ([facility] [place]                                   

 

                                        Voided - Clean Catch

 

 

           Clinical History Laboratory test result                              

    [facility] ([facility]                     

 

           Specimen Adequacy Laboratory test result                             

     [facility] ([facility]                     

 

                                        Satisfactory for evaluation.

 

 

           Microscopic Description Laboratory test result                       

           [facility] ([facility]                     

 

           Gross Description Laboratory test result                             

     [facility] ([facility]                     

 

                                        Received in a specimen container, labele

d with the patients name and ,

is Cloudy Yellow fluid consistent with urine, measuring approximately 60 ml.

 

 

          CPTCode   69844                                   [facility] ([facility] [place]  

 

           Final Diagnosis Laboratory test result                               

   [facility] ([facility] [place])                     

 

                                        NEGATIVE FOR HIGH-GRADE UROTHELIAL CARCI

NOMA.

 

 

           PDF Report Laboratory test result                                  ME

[facility] ([facility] [place])                                  

 

           [id number]   Laboratory test result                                  ME

[facility] ([facility] [place]                                   









                    ID                  Date                Data Source

 

                    [id number]         2020 10:42:00 AM EST [facility] ([facility] [place]









          Name      Value     Range     Interpretation Code Description Data Velia

rce(s) Supporting 

Document(s)

 

           Glucose [Presence] in Urine Laboratory test result                   

               [facility] ([facility] [place])             

 

           Ua Nitrite Laboratory test result                                  ME

[facility] ([facility]

 [facility])                                  

 

           Protein [Presence] in Urine by Test strip Laboratory test result     

                             [facility] 

([facility])  

 

           Ua Leuko   Laboratory test result                                  ME

[facility] ([facility] [place]                                   

 

           Blood [Presence] in Urine by Visual Laboratory test result           

                       [facility] 

([facility])  

 

           Color of Urine Laboratory test result                                

  [facility] ([facility] [place]                     

 

           Ketones [Presence] in Urine by Test strip Laboratory test result     

                             [facility] 

([facility])  

 

           Ua Specific Gravity 1.025      1.003-1.030                       [facility]

NT ([facility] [place])                     

 

           Clarity of Urine Laboratory test result                              

    [facility] ([facility])                     

 

           pH of Urine by Test strip 5.0        5.0-7.5                         

 [facility] ([facility])                     

 

           Urobilinogen [Mass/volume] in Urine by Test strip 0.2 E.U./dL 0.0-1.0

                          [facility]

 ([facility] [place])  

 

           Bilirubin.total [Presence] in Urine by Test strip Laboratory test res

ult                                  

[facility] ([facility])  









                    ID                  Date                Data Source

 

                    [id number]         2020 10:42:00 AM EST [facility] ([facility] 

[facility])









          Name      Value     Range     Interpretation Code Description Data Velia

rce(s) Supporting 

Document(s)

 

           Cytology report of Urine Cyto stain Laboratory test result           

                       [facility] 

([facility])  









                    ID                  Date                Data Source

 

                    [id number]       2020 03:35:00 PM EDT [facility]

l

 

                                        DATE OF EXAMINATION: 2020 12:52 EDT

 ABD/PEL WITH ORAL ONLY  HISTORY: 

Abdominal pain TECHNIQUE:  This CT exam was performed using the following dose 
reduction techniques:automated exposure control, adjustment of mA and/or kV 
according to thepatient's size, and use of iterative reconstruction technique. 
Standard contiguous axial spiral imaging was obtained from the dome of 
thediaphragms through the symphysis pubis with oral contrast and 
withoutintravenous contrast administration and with coronal reformatting. 
FINDINGS: Lower thorax: Unremarkable ABDOMEN: Liver: Moderate fatty 
infiltrationGallbladder and bile ducts: CholecystectomyPancreas: 
UnremarkableSpleen: UnremarkableAdrenals: UnremarkableKidneys and ureters: Left 
nephrectomyStomach and bowel: Moderate acute diverticulitis of distal descending
 colon.There is some associated mucosal thickening which warrants colonoscopy 
once thepatient's current presentation subsides. Diffuse mucosal thickening of 
therectosigmoid is also suspicious for superimposed colitis. Mild secondary 
ileusis seenAppendix: No appendicitis PELVIS: Bladder: Nondistended and 
nonopacified therefore unable to evaluateReproductive: Hysterectomy and 
oophorectomy No free fluid or free air IMPRESSION: Diffuse colitis mainly 
involving the rectosigmoid colon. There is focal severeinflammatory changes of 
distal descending colon consistent with superimposeddiverticulitis as well. 
There is no perforation. Colonoscopy is recommended oncethe patient's current 
presentation subsides. Electronically signed in  by: Laura Nunez M.D. 
2020 15:29 EDT  









          Name      Value     Range     Interpretation Code Description Data [facility](s) Supporting 

Document(s)

 

                                                                       









                    ID                  Date                Data Source

 

                    [id number]    2020 01:13:00 PM EDT [facility]









          Name      Value     Range     Interpretation Code Description Data [facility](s) Supporting 

Document(s)

 

          GLUCOSE   102 mg/dL                         [facility]  

 

          BLOOD UREA NITROGEN 16 mg/dL  7-18                          [facility]

ital  

 

          CREATININE 1.3 mg/dL 0.6-1.0   H                   [facility]  

 

          SODIUM    139 mmol/L 136-145                       [facility]  

 

          POTASSIUM 4.7 mmol/L 3.5-5.1                       [facility]  

 

          CHLORIDE  103 mmol/L                         [facility]  

 

          CO2       26 mmol/L 21-32                         [facility]  

 

          CALCIUM   8.6 mg/dL 8.5-10.1                      [facility]  

 

          ANION GAP 10.0 mmol/L 5-12                          [facility]  

 

          GLOMERULAR FILTRATION RATE 40 mL/min                               [facility]  

 

                                        GFR IS CALCULATED IN mL/min/1.73m2 ABRAHAM

L FUNCTION:                          

>90MILDLY DECREASED:                        60-89MILDY TO MODERATELY DECREASED: 
          45-59        MODERATELY TO SEVERELY DECREASED:        30-44SEVERELY 
DECREASED:                      15-29RENAL FAILURE:                            
<15 

 

          AST       19 U/L    15-37                         [facility]  

 

          ALT       27 U/L    12-78                         [facility]  

 

          ALKALINE PHOSPHATASE 69 U/L                            [facility]

pital  

 

          TOTAL BILIRUBIN 0.5 mg/dL 0.2-1.0                       [facility]

  

 

          TOTAL PROTEIN 7.5 g/dl  6.4-8.2                       [facility]  

 

          ALBUMIN   3.9 gm/dL 3.4-5.0                       [facility]  









                    ID                  Date                Data Source

 

                    CT ABD/PEL WITH ORAL ONLY - [id number] 2020 04:54:45 AM EDT

 [email or web address] ([facility])









          Name      Value     Range     Interpretation Code Description Data Velia

rce(s) Supporting 

Document(s)

 

                                                       CT ABD/PEL WITH ORAL ONLY

 - [id number] [email or web address] ([facility])                                  







                                        Procedure

 

                                          



                                                                                
                                                                                
                                                                                
                                                                                
                                                                              



Social History

          



           Code       Duration   Value      Status     Description Data Source(s

)

 

           Smoking    10/19/2021 12:00:00 AM EDT Never Smoker completed  Never S

moker [email or web address] 

([facility])

 

             Smoking      2021 12:00:00 AM EDT Patient is a former smoker 

completed    Patient 

is a former smoker                      [facility] ([facility])

 

           Smoking    2021 12:00:00 AM EDT Never Smoker completed  Never S

moker [email or web address] 

([facility])

 

           Smoking    2021 12:00:00 AM EDT Never Smoker completed  Never S

moker [email or web address] 

([facility])

 

           Smoking    2021 12:00:00 AM EDT Never Smoker completed  Never S

moker [email or web address] 

([facility])

 

           Smoking    2021 12:00:00 AM EDT Never Smoker completed  Never S

moker [email or web address] 

([facility])

 

           Smoking    2021 12:00:00 AM EDT Never Smoker completed  Never S

moker [email or web address] 

([facility])

 

                      2021 03:32:35 PM EDT Never smoker completed  Never s

[facility]

 

           Smoking    2021 03:32:00 PM EDT Never smoker completed  Never s

[facility]

 

           Smoking    2021 12:00:00 AM EDT Non Smoker completed  Non Smoke

r [facility] 

([facility], )

 

           Smoking    2021 12:00:00 AM EDT Former Smoker completed  Former

 Smoker [email or web address] 

([facility])

 

           Smoking    2021 12:00:00 AM EDT Former Smoker completed  Former

 Smoker [email or web address] 

([facility])

 

           Smoking    2021 12:00:00 AM EDT Former Smoker completed  Former

 Smoker [email or web address] 

([facility])

 

           Smoking    2021 12:00:00 AM EDT Former Smoker completed  Former

 Smoker [email or web address] 

([facility])

 

           Smoking    2021 12:00:00 AM EDT Former Smoker completed  Former

 Smoker [email or web address] 

([facility])

 

           Smoking    2021 12:00:00 AM EDT Former Smoker completed  Former

 Smoker [email or web address] 

([facility])

 

           Smoking    2021 12:00:00 AM EDT Former Smoker completed  Former

 Smoker [email or web address] 

([facility])

 

           Smoking    2020 12:00:00 AM EST Former Smoker completed  Former

 Smoker [email or web address] 

([facility])

 

           Smoking    2020 12:00:00 AM EST Former Smoker completed  Former

 Smoker [email or web address] 

([facility])

 

           Smoking    2020 12:00:00 AM EST Former Smoker completed  Former

 Smoker [email or web address] 

([facility])

 

           Smoking    2020 12:00:00 AM EST Former Smoker completed  Former

 Smoker [email or web address] 

([facility])

 

           Smoking    2020 12:00:00 AM EST Former Smoker completed  Former

 Smoker [email or web address] 

([facility])

 

           Smoking    2020 12:00:00 AM EST Former Smoker completed  Former

 Smoker [email or web address] 

([facility])

 

           Smoking    2020 12:00:00 AM EST Former Smoker completed  Former

 Smoker [email or web address] 

([facility])

 

           Smoking    2020 12:00:00 AM EST Former Smoker completed  Former

 Smoker [email or web address] 

([facility])

 

           Smoking    2020 12:00:00 AM EST Former Smoker completed  Former

 Smoker [email or web address] 

([facility])

 

           Smoking    2020 12:00:00 AM EST Former Smoker completed  Former

 Smoker [email or web address] 

([facility])

 

           Smoking    2020 12:00:00 AM EST Former Smoker completed  Former

 Smoker [email or web address] 

([facility])

 

           Smoking    2020 12:00:00 AM EST Former Smoker completed  Former

 Smoker [email or web address] 

([facility])

 

             Smoking      2020 12:00:00 AM EST Former Cigarette Smoker com

pleted    Former 

Cigarette Smoker                        [facility] ([facility] [place])

 

           Smoking    2020 12:00:00 AM EST Former Smoker completed  Former

 Smoker [email or web address] 

([facility])

 

           Smoking    2020 12:00:00 AM EST Former Smoker completed  Former

 Smoker [email or web address] 

([facility])

 

           Smoking    2020 12:00:00 AM EST Former Smoker completed  Former

 Smoker [email or web address] 

([facility])

 

           Smoking    2020 12:00:00 AM EDT Former Smoker completed  Former

 Smoker [email or web address] 

([facility])

 

           Smoking    2020 12:00:00 AM EDT Former Smoker completed  Former

 Smoker [email or web address] 

([facility])

 

           Smoking    2020 12:00:00 AM EDT Former Smoker completed  Former

 Smoker [email or web address] 

([facility])

 

           Smoking    2020 12:00:00 AM EDT Former Smoker completed  Former

 Smoker [email or web address] 

([facility])

 

           Smoking    2020 12:00:00 AM EDT Former Smoker completed  Former

 Smoker [email or web address] 

([facility])

 

           Smoking    2020 12:00:00 AM EDT Former Smoker completed  Former

 Smoker [email or web address] 

([facility])

 

           Smoking    2020 12:00:00 AM EDT Former Smoker completed  Former

 Smoker [email or web address] 

([facility])

 

           Smoking    2020 12:00:00 AM EDT Former Smoker completed  Former

 Smoker [email or web address] 

([facility])

 

           Smoking    2020 12:00:00 AM EDT Former Smoker completed  Former

 Smoker [email or web address] 

([facility])

 

           Smoking    2020 12:00:00 AM EDT Former Smoker completed  Former

 Smoker [email or web address] 

([facility])

 

           Smoking    2020 12:00:00 AM EDT Former Smoker completed  Former

 Smoker [email or web address] 

([facility])

 

           Smoking    2020 12:00:00 AM EDT Former Smoker completed  Former

 Smoker [email or web address] 

([facility])

 

           Smoking    2020 12:00:00 AM EDT Former Smoker completed  Former

 Smoker [email or web address] 

([facility])

 

           Smoking    2020 12:00:00 AM EDT Former Smoker completed  Former

 Smoker [email or web address] 

([facility])

 

           Smoking    2020 12:00:00 AM EDT Former Smoker completed  Former

 Smoker [email or web address] 

([facility])

 

           Smoking    2020 12:00:00 AM EDT Former Smoker completed  Former

 Smoker eCW1 

([facility])

 

           Smoking    2020 12:00:00 AM EDT Former Smoker completed  Former

 Smoker eCW1 

([facility])



                                                                                
                                                                                
                                                                                
                                                                                
                                                                                
                                                                                
                                                                                
                            



Vital Signs

          



                    ID                  Date                Data Source

 

                    UNK                                      









           Name       Value      Range      Interpretation Code Description Data

 Source(s)

 

           Body weight 158.00 [lb_av]                       158.00 [lb_av] MEDEN

T ([facility])

 

           Body height 61 [in_i]                        61 [in_i]  MEDENT ([facility])

 

                                        5'1" 

 

           Body mass index (BMI) [Ratio] 29.9 kg/m2                       29.9 k

g/m2 MEDENT ([facility])

 

           Heart rate 52 /min                          52 /min    MEDENT ([facility]

[facility])

 

           Systolic blood pressure--sitting 170 mm[Hg]                       170

 mm[Hg] MEDENT ([facility])

 

                                        CBP large cuff, Ra 

 

           Diastolic blood pressure--sitting 70 mm[Hg]                        70

 mm[Hg]  MEDENT ([facility])

 

                                        CBP large cuff, Ra 

 

           Body weight 158.00 [lb_av]                       158.00 [lb_av] eCW1 

([facility])

 

           Body height 61 [in_i]                        61 [in_i]  eCW1 ([facility])

 

           Body mass index (BMI) [Ratio] 29.85 kg/m2                       29.85

 kg/m2 eCW1 ([facility])

 

           Heart rate 88 /min                          88 /min    eCW1 ([facility])

 

           Respiratory rate 18 /min                          18 /min    eCW1 ([facility])

 

           Body temperature 96.9 [degF]                       96.9 [degF] eCW1 (

[facility])

 

           Systolic blood pressure 138 mm[Hg]                       138 mm[Hg] e

CW1 ([facility])

 

           Diastolic blood pressure 82 mm[Hg]                        82 mm[Hg]  

eCW1 ([facility])

 

           Body weight 155.00 [lb_av]                       155.00 [lb_av] MEDEN

T ([facility])

 

           Body height 61 [in_i]                        61 [in_i]  MEDENT ([facility])

 

                                        5'1" 

 

           Body mass index (BMI) [Ratio] 29.3 kg/m2                       29.3 k

g/m2 MEDENT ([facility])

 

           Heart rate 55 /min                          55 /min    MEDENT ([facility]

[facility])

 

           Systolic blood pressure--sitting 148 mm[Hg]                       148

 mm[Hg] MEDENT ([facility])

 

                                        CBP, adult cuff/Ra 

 

           Diastolic blood pressure--sitting 63 mm[Hg]                        63

 mm[Hg]  MEDENT ([facility])

 

                                        CBP, adult cuff/Ra 

 

           Systolic blood pressure 130 mm[Hg]                       130 mm[Hg] M

EDENT ([facility])

 

           Diastolic blood pressure 80 mm[Hg]                        80 mm[Hg]  

MEDENT ([facility])

 

           Body temperature 95.7 [degF]                       95.7 [degF] MEDENT

 ([facility])

 

           Body mass index (BMI) [Ratio] 29.7 kg/m2                       29.7 k

g/m2 MEDENT ([facility])

 

           Heart rate 56 /min                          56 /min    MEDENT ([facility]

[facility])

 

           Body weight 157.00 [lb_av]                       157.00 [lb_av] MEDEN

T ([facility])

 

           Body height 61 [in_i]                        61 [in_i]  MEDENT ([facility])

 

                                        5'1" 

 

           Systolic blood pressure--sitting 152 mm[Hg]                       152

 mm[Hg] MEDENT ([facility])

 

                                        CBP, adult cuff/Ra 

 

           Diastolic blood pressure--sitting 63 mm[Hg]                        63

 mm[Hg]  MEDENT ([facility])

 

                                        CBP, adult cuff/Ra 

 

           Body weight 160.00 [lb_av]                       160.00 [lb_av] MEDEN

T ([facility])

 

           Body mass index (BMI) [Ratio] 30.2 kg/m2                       30.2 k

g/m2 [facility] ([facility])

 

           Ideal body weight 105 [lb_av]                       105 [lb_av] MEDEN

T ([facility])

 

           Body weight 72.576 kg                        72.576 kg  [facility] ([facility])

 

           Body surface area Derived from formula 1.72 m2                       

   1.72 m2    [facility] ([facility])

 

           Body height 61 [in_i]                        61 [in_i]  [facility] ([facility])

 

                                        5'1" 

 

           Systolic blood pressure 124 mm[Hg]                       124 mm[Hg] M

EDENT ([facility])

 

           Diastolic blood pressure 78 mm[Hg]                        78 mm[Hg]  

[facility] ([facility])

 

           Heart rate 85 /min                          85 /min    [facility] ([facility])

 

           Oxygen saturation in Arterial blood by Pulse oximetry 97 %           

                  97 %       [facility] 

([facility])

 

           Body temperature 97.0 [degF]                       97.0 [degF] [facility]

 ([facility])

 

           Body height 59.25 [in_i]                       59.25 [in_i] eCW1 ([facility])

 

           Body weight 161.2 [lb_av]                       161.2 [lb_av] eCW1 ([facility])

 

           Body mass index (BMI) [Ratio] 32.28 kg/m2                       32.28

 kg/m2 eCW1 ([facility])

 

           Body temperature 98.5 [degF]                       98.5 [degF] eCW1 (

[facility])

 

           Heart rate 66 /min                          66 /min    eCW1 ([facility])

 

           Respiratory rate 18 /min                          18 /min    eCW1 ([facility])

 

           Oxygen saturation in Arterial blood by Pulse oximetry 98 %           

                  98 %       eCW1 ([facility])

 

           Body height 59.25 [in_i]                       59.25 [in_i] eCW1 ([facility])

 

           Body weight 159.2 [lb_av]                       159.2 [lb_av] eCW1 ([facility])

 

           Body mass index (BMI) [Ratio] 31.88 kg/m2                       31.88

 kg/m2 eCW1 ([facility])

 

           Body temperature 98.6 [degF]                       98.6 [degF] eCW1 (

[facility])

 

           Heart rate 67 /min                          67 /min    eCW1 ([facility])

 

           Respiratory rate 18 /min                          18 /min    eCW1 ([facility])

 

           Oxygen saturation in Arterial blood by Pulse oximetry 98 %           

                  98 %       eCW1 ([facility])

 

           Body height 61 [in_i]                        61 [in_i]  [facility]ENT ([facility]

 

                                        5'1" 

 

           Body weight 155.00 [lb_av]                       155.00 [lb_av] MEDEN

T ([facility])

 

           Body weight 70.308 kg                        70.308 kg  MED[facility] ([facility]

 

           Body mass index (BMI) [Ratio] 29.3 kg/m2                       29.3 k

g/m2 [facility] ([facility])

 

           Systolic blood pressure 143 mm[Hg]                       143 mm[Hg] M

EDENT ([facility])

 

           Diastolic blood pressure 76 mm[Hg]                        76 mm[Hg]  

MED[facility] ([facility])

 

           Heart rate 60 /min                          60 /min    [facility] ([facility]

 

           Body height 59.25 [in_i]                       59.25 [in_i] eCW1 ([facility])

 

           Body temperature 98.5 [degF]                       98.5 [degF] eCW1 (

[facility])

 

           Heart rate 62 /min                          62 /min    eCW1 ([facility])

 

           Respiratory rate 18 /min                          18 /min    eCW1 ([facility])

 

           Oxygen saturation in Arterial blood by Pulse oximetry 98 %           

                  98 %       eCW1 ([facility])

 

           Body height 59.25 [in_i]                       59.25 [in_i] eCW1 ([facility])

 

           Heart rate 57 /min                          57 /min    eCW1 ([facility])

 

           Respiratory rate 17 /min                          17 /min    eCW1 ([facility])

 

           Oxygen saturation in Arterial blood by Pulse oximetry 96 %           

                  96 %       eCW1 ([facility])

 

           Respiratory rate 18 /min                          18 /min    eCW1 ([facility])

 

           Oxygen saturation in Arterial blood by Pulse oximetry 98 %           

                  98 %       eCW1 ([facility])

 

           Body height 59.25 [in_i]                       59.25 [in_i] eCW1 ([facility])

 

           Body weight 154.2 [lb_av]                       154.2 [lb_av] eCW1 ([facility])

 

           Body mass index (BMI) [Ratio] 30.88 kg/m2                       30.88

 kg/m2 eCW1 ([facility])

 

           Body temperature 98.0 [degF]                       98.0 [degF] eCW1 (

[facility])

 

           Heart rate 51 /min                          51 /min    eCW1 ([facility])

 

           Body height 59.25 [in_i]                       59.25 [in_i] eCW1 ([facility])

 

           Body temperature 98.5 [degF]                       98.5 [degF] eCW1 (

[facility])

 

           Heart rate 63 /min                          63 /min    eCW1 ([facility])

 

           Respiratory rate 17 /min                          17 /min    eCW1 ([facility])

 

           Oxygen saturation in Arterial blood by Pulse oximetry 94 %           

                  94 %       eCW1 ([facility])

 

           Body height 59.25 [in_i]                       59.25 [in_i] eCW1 ([facility])

 

           Body weight 161.3 [lb_av]                       161.3 [lb_av] eCW1 ([facility])

 

           Body mass index (BMI) [Ratio] 32.30 kg/m2                       32.30

 kg/m2 eCW1 ([facility])

 

           Body temperature 96.7 [degF]                       96.7 [degF] eCW1 (

[facility])

 

           Heart rate 54 /min                          54 /min    eCW1 ([facility])

 

           Respiratory rate 20 /min                          20 /min    eCW1 ([facility])

 

           Oxygen saturation in Arterial blood by Pulse oximetry 96 %           

                  96 %       eCW1 ([facility])

 

           Body height 59.25 [in_i]                       59.25 [in_i] eCW1 ([facility])

 

           Body weight 161.3 [lb_av]                       161.3 [lb_av] eCW1 ([facility])

 

           Body mass index (BMI) [Ratio] 32.30 kg/m2                       32.30

 kg/m2 eCW1 ([facility])

 

           Body temperature 96.7 [degF]                       96.7 [degF] eCW1 (

[facility])

 

           Heart rate 54 /min                          54 /min    eCW1 ([facility])

 

           Respiratory rate 20 /min                          20 /min    eCW1 ([facility])

 

           Oxygen saturation in Arterial blood by Pulse oximetry 96 %           

                  96 %       eCW1 ([facility])

 

           Body height 59.25 [in_i]                       59.25 [in_i] eCW1 ([facility])

 

           Body weight 155 [lb_av]                       155 [lb_av] eCW1 ([facility])

 

           Body mass index (BMI) [Ratio] 31.04 kg/m2                       31.04

 kg/m2 eCW1 ([facility])

 

           Body temperature 98 [degF]                        98 [degF]  eCW1 ([facility])

 

           Heart rate 70 /min                          70 /min    eCW1 ([facility])

 

           Respiratory rate 18 /min                          18 /min    eCW1 ([facility])

 

           Oxygen saturation in Arterial blood by Pulse oximetry 98 %           

                  98 %       eCW1 ([facility])



                                                                                
                  



Patient Treatment Plan of Care

          



             Planned Activity Planned Date Details      Description  Data Source

(s)

 

                          12 HR ranolazine 1000 MG Extended Release Oral Tablet 

[Ranexa] 10/19/2021 

12:00:00 AM EDT                                             eCW1 ([facility])

 

             valsartan 160 MG Oral Tablet 2021 12:00:00 AM EST            

               eCW1 ([facility])

 

             valsartan 160 MG Oral Tablet 2021 12:00:00 AM EST            

               eCW1 ([facility])

 

             valsartan 160 MG Oral Tablet 2021 12:00:00 AM EST            

               eCW1 ([facility])

 

             valsartan 160 MG Oral Tablet 2021 12:00:00 AM EST            

               eCW1 ([facility])

 

             valsartan 160 MG Oral Tablet 2021 12:00:00 AM EST            

               eCW1 ([facility])

 

             valsartan 160 MG Oral Tablet 2021 12:00:00 AM EST            

               eCW1 ([facility])

 

             valsartan 160 MG Oral Tablet 2021 12:00:00 AM EST            

               eCW1 ([facility])

 

             valsartan 160 MG Oral Tablet 2021 12:00:00 AM EST            

               eCW1 ([facility])

 

             Fluconazole 150 MG Oral Tablet [Diflucan] 2020 12:00:00 AM ED

T                           eCW1 

([facility])

 

             Nystatin 100 UNT/MG Topical Ointment 2020 12:00:00 AM EDT    

                       eCW1 ([facility])

 

             Fluconazole 150 MG Oral Tablet [Diflucan] 2020 12:00:00 AM ED

T                           eCW1 

([facility])

 

             Nystatin 100 UNT/MG Topical Ointment 2020 12:00:00 AM EDT    

                       eCW1 ([facility])

 

             Fluconazole 150 MG Oral Tablet [Diflucan] 2020 12:00:00 AM ED

T                           eCW1 

([facility])

 

             Nystatin 100 UNT/MG Topical Ointment 2020 12:00:00 AM EDT    

                       eCW1 ([facility])

 

                          1 ML evolocumab 140 MG/ML Prefilled Syringe [Repatha] 

2020 12:00:00 AM EDT

                                                            eCW1 ([facility])

 

                          1 ML evolocumab 140 MG/ML Prefilled Syringe [Repatha] 

2020 12:00:00 AM EDT

                                                            eCW1 ([facility])

 

                          1 ML evolocumab 140 MG/ML Prefilled Syringe [Repatha] 

2020 12:00:00 AM EDT

                                                            eCW1 ([facility])

 

                          1 ML evolocumab 140 MG/ML Prefilled Syringe [Repatha] 

2020 12:00:00 AM EDT

                                                            eCW1 ([facility])

 

                          Acetaminophen 325 MG / Oxycodone Hydrochloride 5 MG Or

al Tablet [Percocet] 

2020 12:00:00 AM EDT                                         eCW1 ([facility])

 

                          12 HR Amoxicillin 1000 MG / Clavulanate 62.5 MG Extend

ed Release Oral Tablet 

2020 12:00:00 AM EDT                                         eCW1 ([facility])

## 2021-10-26 NOTE — CCD
Summarization of Episode Note

                             Created on: 2021



Mrs. Eryn Jacobo

External Reference #: [id number]

: 1945

Sex: Female



Demographics





                          Address                   [address]

[place]  [address]

 

                          Home Phone                [phone]

 

                          Preferred Language        Unknown

 

                          Marital Status            Unknown

 

                          Pentecostal Affiliation     Unknown

 

                          Race                      White

 

                          Ethnic Group              Not  or 





Author





                          Author                    [facility]

 

                          Organization              [facility]

 

                          Address                   Unknown

 

                          Phone                     Unavailable







Support





                Name            Relationship    Address         Phone

 

                    Eryn Jacobo      GUAR                [address]

[place]  [address]                  [phone]

 

                    LIZBETH JACOBO       ECON                [address] [place]  [address]                  [phone]







Care Team Providers





                    Care Team Member Name Role                Phone

 

                    Sumaya Martinez   Unavailable         [phone]







PROBLEMS





          Type      Condition ICD9-CM Code UEM92-TE Code Onset Dates Condition S

tatus W/U 

Status              Risk                SNOMED Code         Notes

 

        Problem GERD (gastroesophageal reflux disease)         K21.9           A

ctive  confirmed         

[id number]                                

 

       Problem Peripheral vascular disease        I73.9         Active confirmed

        [id number]  

 

       Problem Fibromyalgia        M79.7         Active confirmed        5574073

05  

 

           Problem    Chronic seasonal allergic rhinitis, unspecified trigger   

         J30.2                 Active

                confirmed                       [id number]        

 

       Problem JACQUI (obstructive sleep apnea)        G47.33        Active confirm

ed        [id number]  

 

        Problem History of solitary pulmonary nodule         Z87.898         Act

juany  confirmed         

[id number]                                

 

       Problem Hyperlipidemia        E78.5         Active confirmed        06664

004  

 

        Problem History of MI (myocardial infarction)         I25.2           Ac

tive  confirmed         

[id number]                                

 

       Problem CAD (coronary artery disease)        I25.10        Active confirm

ed        44239266  

 

       Problem Anxiety        F41.9         Active confirmed        40671502  

 

                          Problem                   Type 2 diabetes mellitus wit

hout complication, unspecified whether long 

term insulin use         E11.9           Active  confirmed         [id number]  

 

        Problem Chronic kidney disease, stage 3 (moderate)         N18.3        

   Active  confirmed         

[id number]                                

 

       Problem Essential (primary) hypertension        I10           Active conf

irmed        [id number]  







ALLERGIES





                    Allergen (clinical drug ingredient) Drug/Non Drug Allergy do

cumented on EMR 

Reaction            Allergy Type        Onset Date          Status

 

                      Divalproex Sod shakey, sick to stomach Non Drug Allergy   

         Active

 

           tetracycline Tetracycline HCl(NDC Code:[address]-57) diarrhea   Drug

 Allergy            

Active

 

                      tape       rash       Non Drug Allergy            Active

 

                sulfamethoxazole / trimethoprim Bactrim(NDC Code:[address]-30) 

rash            Drug 

Allergy                                             Active

 

           erythromycin Erythromycin(NDC Code:[address]-84) diarrhea   Drug All

ergy            Active

 

                      Butabarbital Sodium hives      Drug Allergy            Act

juany







ENCOUNTERS from 1945 to 2021





             Encounter    Location     Date         Provider     Diagnosis

 

                    [address] [address] 

Sep, 2021                 Sumaya Juan            







IMMUNIZATIONS





                Vaccine         Route           Administration Date Status

 

                Cyanocobalamin  IM Intramuscular 2020   Administered

 

                Cyanocobalamin  SC Subcutaneous Aug 14, 2020    Administered

 

                Cyanocobalamin  IM Intramuscular 2020   Administered

 

                Cyanocobalamin  IM Intramuscular Oct 14, 2020    Administered

 

                CYANOCOBALAMIN  IM Intramuscular 2020  Administered

 

                CYANOCOBALAMIN  IM Intramuscular 2020  Administered

 

                Cyanocobalamin  IM Intramuscular May 13, 2020    Administered

 

                Pneumococcal  Prevnar 13 IM Intramuscular 2019   Admini

stered

 

                CYANOCOBALAMIN  IM Intramuscular Dec 16, 2020    Administered

 

                CYANOCOBALAMIN  IM Intramuscular 2020    Administered

 

                CYANOCOBALAMIN  IM Intramuscular 2019    Administered

 

                Cyanocobalamin  IM Intramuscular 2019   Administered

 

                INFLUENZA 3 YRS AND OLDER Preservative free IM Intramuscular 2017    

Administered

 

                Pneumococcal Vaccine PNEUMO 23 IM Intramuscular Aug 14, 2020    

Administered

 

                TDAP 7yrs + Vaccine - Boostrix IM Intramuscular Aug 14, 2020    

Administered

 

                Influenza preservative free IM Intramuscular Oct 14, 2020    Adm

inistered

 

                Influenza preservative free IM Intramuscular 2019    Adm

inistered







SOCIAL HISTORY

Tobacco Use:



                    Social History Observation Description         Date

 

                    Details (start date - stop date) Former Smoker        



Sex Assigned At Birth:



                          Social History Observation Description

 

                          Sex Assigned At Birth     Unknown



Tobacco Use/Smoking



                    Question            Answer              Notes

 

                    Are you a           former smoker        

 

                    How long has it been since you last smoked? 5-10 years      

     







REASON FOR REFERRAL

No Information



VITAL SIGNS

No information



MEDICATIONS





           Medication SIG (Take, Route, Frequency, Duration) Notes      Start Da

te End Date   

Status

 

           Allopurinol 300 MG as directed Orally once a day                     

             Active

 

                traMADol HCl 50 MG 1 tablet as needed Orally every 6 hrs for 30 

days PRN                                                             Not-Taking

 

           Clopidogrel Bisulfate 75 MG 1 tablet Orally Once a day for 90 days   

                               Active

 

           Aspirin 325 MG 1 tablet Orally Once a day for 90 days                

                  Active

 

                Percocet 5-325 MG 1 tablet as needed Orally every 6 hrs for 7 da

ys                 27 Aug, 

2020                                                Not-Taking

 

             Metoprolol Tartrate 25 MG 1 tablet with food Orally Twice a day for

 90 days                           

                                        Active

 

                          Nitroglycerin 0.4 mg      1 tablet under the tongue an

d allow to dissolve as needed 

Sublingual every 0 hrs for 90 days PRN                                          

   Not-Taking

 

           Meclizine HCl 25 MG 1 tablet as needed Orally QD PRN for 180         

                         Active

 

           ONE TOUCH TEST STRIPS as directed V250.0 QID AND PRN                 

                 Active

 

           ONE TOUCH ULTRA GLUCOMETER V250.0 - - for -                          

        Active

 

           Pantoprazole Sodium 40 MG 1 tablet Orally Once a day for 90          

                        Active

 

           Klor-Con 10 10 meq 1 tablet Once a day PRN                           

   Not-Taking

 

           Valsartan 80 MG 1 tablet Orally twice daily for 90 days cardiology   

                    

Not-Taking

 

           CPAP MACHINE - - DX: JACQUI - for -                                  Act

juany

 

           Valsartan 160 MG 1 tablet Orally Once a day for 90 days                        Active

 

           Stool Softener 100 MG 1 capsule as needed Orally Once a day for 90 da

ys PRN                              

Not-Taking

 

                Nystatin 201469 UNIT/GM 1 application Externally Twice a day for

 14 days                 09 

Sep, 2020                                           Active

 

           Vitamin D3 89533 UNIT 1 tablet Orally Once a day                     

             Active

 

           Promethazine HCl 25 MG 1 tablet at bedtime Orally PRN for 90 days PRN

                              

Not-Taking

 

           Escitalopram Oxalate 10 MG 1 tablet Orally Once a day for 90 day(s)  

                                Active

 

           Ranolazine ER 1000 MG TAKE ONE TABLET BY MOUTH TWICE A DAY for 90    

                              Active

 

           Vancomycin 1 tab Oral for 14 days 250mg cap PRN                      

 Not-Taking

 

           Probiotic - as directed Orally                                  Activ

e







PROCEDURES

No Information



RESULTS

No Results



REASON FOR VISIT

pcp



MEDICAL (GENERAL) HISTORY





                    Type                Description         Date

 

                    Medical History     - MI             

 

                    Medical History     Kidney stone         

 

                    Medical History     Esophagitis with floating hernia  

 

                    Medical History     Bulging lumbar disc  

 

                    Medical History     Fibromyalgia         

 

                    Medical History     B-12 Deficiency      

 

                    Medical History     DM                   

 

                    Medical History     HLD                  

 

                    Medical History     Diverticulosis       

 

                    Medical History     C-Diff               

 

                    Medical History     Colonoscopy  (polyps)  

 

                    Medical History     Pneumonia vaccine   

 

                    Medical History     Gout                 

 

                          Medical History           Diverticulitis of large inte

eliz without perforation or abscess

without bleeding                         

 

                    Surgical History    Left Carotid stent  2014

 

                    Surgical History    CABG                

 

                    Surgical History    Appendectomy         

 

                    Surgical History    Hysterectomy        

 

                    Surgical History    Gall bladder removed  

 

                    Surgical History    Lumbar Discectomy   1973

 

                    Surgical History    Lithoprasty          

 

                    Surgical History    Bladder and rectalseal  

 

                    Surgical History    Cardiac Catheterization 17

 

                    Surgical History    Partial colectomy with reanastamosis 201

8

 

                    Hospitalization History With surgeries       







Goals Section

No Information



Health Concerns

No Information



MEDICAL EQUIPMENT

No Information



MENTAL STATUS

No Information



FUNCTIONAL STATUS

No Information



ASSESSMENTS

No Information



PLAN OF TREATMENT

Medication



                Medication Name Sig             Start Date      Stop Date

 

                Pantoprazole Sodium 40 MG 1 tablet Orally Once a day for 90     

             

 

                Ranolazine ER 1000 MG TAKE ONE TABLET BY MOUTH TWICE A DAY for 9

0                  

 

                Escitalopram Oxalate 10 MG 1 tablet Orally Once a day for 90 day

(s)                  

 

                Metoprolol Tartrate 25 MG 1 tablet with food Orally Twice a day 

for 90 days                  

 

                Clopidogrel Bisulfate 75 MG 1 tablet Orally Once a day for 90 da

ys                  







Insurance Providers





             Payer Name   Payer Address Payer Phone  Insured Name Patient Relati

onship to 

Insured                   Coverage Start Date       Coverage End Date

 

                    [employer] [facility] [address] [facility] [address] 

[phone]    Eryn Jacobo                             

 

                [employer] [address]  [facility] [address]

2 [phone]    

Eryn Jacobo                                     

 

                [employer] [place] [address]  [facility] [address] [phone]    

Eryn Jacobo

## 2021-10-26 NOTE — CCD
Summarization of Episode Note

                             Created on: 09/15/2021



Dr. LORI JACOBO

External Reference #: [id number]

: 1945

Sex: Female



Demographics





                          Address                   [address] 

[place]  [address]

 

                          Home Phone                [phone]

 

                          Preferred Language        Unknown

 

                          Marital Status            Unknown

 

                          Restorationist Affiliation     Unknown

 

                          Race                      White

 

                          Ethnic Group              Not  or 





Author





                          Author                    [facility]

ems

 

                          Organization              [facility]

ems

 

                          Address                   Unknown

 

                          Phone                     Unavailable







Support





                Name            Relationship    Address         Phone

 

                    LORI JACOBO                [address] 

[place]  [address]                  [phone]

 

                Trevin Jacob[place]  [address] [phone]







Care Team Providers





                    Care Team Member Name Role                Phone

 

                    Kailee West        Unavailable         [phone]







PROBLEMS





          Type      Condition ICD9-CM Code AUY51-IM Code Onset Dates Condition S

tatus W/U 

Status              Risk                SNOMED Code         Notes

 

       Problem Obstructive sleep apnea        G47.33        Active confirmed    

    [id number]  

 

       Problem Anxiety        F41.9         Active confirmed        [id number] She

 is on Lexapro therapy 

at 20 g daily but she feels that is too strong. She will cut back to 10 mg 
daily.

 

       Problem Essential hypertension        I10           Active confirmed     

   [id number]  

 

                          Problem                   Coronary artery disease of b

ypass graft of native heart with stable 

angina pectoris         I25.708         Active  confirmed         [id number] She 

has coronary artery

disease with a history of interventions. She is complaining of dyspnea on 
exertion. She does not desaturate with ambulation. She is on a beta blocker, 
ARB, Ranexa, aspirin and Plavix therapy. She indicates that she had a stress 
test within the last year or so. I've asked her cardiologist to consider 
reassessment of her coronary anatomy and pulmonary pressures in the near future.

 

       Problem Lumbosacral spondylolysis        M43.07        Active confirmed  

      [id number]  

 

       Problem Sleep apnea in adult        G47.30        Active confirmed       

 [id number]  

 

                          Problem                   Hypertensive heart and chron

ic kidney disease with heart failure and 

stage 1 through stage 4 chronic kidney disease, or unspecified chronic kidney 
disease         I13.0           Active  confirmed         [id number] She did not 

do well with a change

from her valsartan and hydrochlorothiazide to irbesartan therapy and is back on 
the former with reasonable blood pressure control. I think the 
hydrochlorothiazide was stopped because of her chronic renal disease/solitary 
kidney but I doubt that the low dose of the thiazide has substantially affected 
her kidney function. She will keep her nephrology follow-up.

 

          Problem   Gastro-esophageal reflux disease with esophagitis           

K21.0               Active    

confirmed                               [id number]            

 

          Problem   Other osteoarthritis of spine, lumbar region           M47.8

96             Active    

confirmed                               [id number]            

 

        Problem Stenosis of left carotid artery         I65.22          Active  

confirmed         

[id number]                          

 

       Problem Coronary arteriosclerosis        I25.10        Active confirmed  

      76251254  

 

          Problem   DDD (degenerative disc disease), lumbosacral           M51.3

7              Active    confirmed

                                        07679354             

 

                Problem         Chronic gout due to renal impairment without top

hus, unspecified site                 

M1A.30X0              Active     confirmed             [id number]  

 

       Problem Mixed hyperlipidemia        E78.2         Active confirmed       

 931000926  

 

       Problem Fibromyalgia        M79.7         Active confirmed        6510354

05  

 

        Problem Hx of malignant neoplasm of kidney         Z85.528         Activ

e  confirmed         

[id number]                                

 

       Problem Amaurosis fugax, both eyes        G45.3         Active confirmed 

       05976329  

 

       Problem Vitamin D deficiency        E55.9         Active confirmed       

 74883953  

 

          Problem   History of non anemic vitamin B12 deficiency           Z86.3

9              Active    confirmed

                                        446777484            

 

        Problem PVD (peripheral vascular disease)         I73.9           Active

  confirmed         692143954

                                         

 

       Problem Other chronic pain        G89.29        Active confirmed        8

5683558  

 

          Problem   Recurrent colitis due to Clostridium difficile           A04

.71              Active    

confirmed                               615127840            

 

           Problem    Malignant neoplasm of urinary bladder, unspecified site   

         C67.9                 Active

                confirmed                       727686547        

 

       Problem Dizziness        R42           Active confirmed        394719332 

 

 

                Problem         Chronic congestive heart failure, unspecified he

art failure type                 I50.9

                      Active     confirmed             33131230    

 

       Problem Solitary kidney, acquired        Z90.5         Active confirmed  

      [id number]  

 

       Problem Hypercholesterolemia        E78.00        Active confirmed       

 65462459  







ALLERGIES





                    Allergen (clinical drug ingredient) Drug/Non Drug Allergy do

cumented on EMR 

Reaction            Allergy Type        Onset Date          Status

 

           tetracycline Tetracycline HCl(NDC Code:[address]-39) diarrhea   Drug

 Allergy            

Active

 

                      BuSpar     Unknown    Drug Allergy            Active

 

                      Minocins   diarrhea   Non Drug Allergy            Active

 

           nitazoxanide Alinia(NDC Code:[address]-10) Hives      Drug Allergy  

          Active

 

                      nitrostat  Anaphylaxis Non Drug Allergy            Active

 

           evolocumab Repatha(NDC Code:[address]-02) Rash       Drug Allergy   

         Active

 

                      Statins (for Allergy Use Only) muscle soreness weakness Dr

ug Allergy            Active

 

           carvedilol Carvedilol(NDC Code:[address]-83) Unknown    Drug Allergy

            Active

 

                      Codeine Phosphate (For Allergies Use Only) Hives      Drug

 Allergy            Active

 

                      Butabarbital Sodium Hives      Drug Allergy            Act

juany

 

                sulfamethoxazole / trimethoprim Bactrim(NDC Code:[address]-15) 

Rash            Drug 

Allergy                                             Active

 

                      Trental    sick       Drug Allergy            Active

 

                      Adhesive Tape Hives      Drug Allergy            Active







ENCOUNTERS from 1945 to 2021





             Encounter    Location     Date         Provider     Diagnosis

 

                    [facility] [phone] [place] [address] 14 Sep, 2021

                          Kailee West                Anxiety F41.9







IMMUNIZATIONS





                Vaccine         Route           Administration Date Status

 

                Influenza 18 yrs & older Flublok IM Intramuscular Dec 20, 2018  

  Administered

 

                Vitamin B-12 1000mcg/1mL Cyanocobalamin IM Intramuscular Aug 28,

 2019    

Administered

 

                Pneumococcal Adult 0.5mL Pneumovax 23 Unknown         

013   Administered

 

                Influenza 6mo & up Fluzone Unknown         Dec 08, 2015    Refus

ed







SOCIAL HISTORY

Tobacco Use:



                    Social History Observation Description         Date

 

                    Details (start date - stop date) Never Smoker         



Sex Assigned At Birth:



                          Social History Observation Description

 

                          Sex Assigned At Birth     Unknown



Education:



                    Question            Answer              Notes

 

                    Level of Education: Not finished High School  



Audit



                    Question            Answer              Notes

 

                    Total Score:        0                    

 

                    Interpretation:     Alcohol Education    



Language:



                    Question            Answer              Notes

 

                    Languages spoken:   English              



Sabianist:



                    Question            Answer              Notes

 

                    Sabianist            [address]         



Sexual Hx:



                    Question            Answer              Notes

 

                    Had sex in the last 12 months (vaginal, oral, or anal)? No  

                 

 

                    Have you ever had an STD? No                   



Drug and Alcohol



                    Question            Answer              Notes

 

                    Total Score:        0                    

 

                    Interpretation:     No problems reported  



Alcohol Screening:



                    Question            Answer              Notes

 

                    Did you have a drink containing alcohol in the past year? No

                   

 

                    Points              0                    

 

                    Interpretation      Negative             



Tobacco Use:



                    Question            Answer              Notes

 

                    Are you a:          never smoker        former smoker

 

                    Additional Findings: Tobacco User                     quit 5

 years ago







REASON FOR REFERRAL

No Information



VITAL SIGNS

No information



MEDICATIONS





           Medication SIG (Take, Route, Frequency, Duration) Notes      Start Da

te End Date   

Status

 

           Vitamin D3 125 MCG (5000 UT) 1 capsule Orally weekly                 

                 Active

 

           Metoprolol Tartrate 25 mg 1 tab Orally twice daily                   

               Active

 

           Aspirin 325 MG 1 tablet Orally Once a day                            

      Active

 

                          May Have -                please provide new tubing fo

r 2 concentrator. DX code:G47.33 to use 

daily for 99 months                 11 Sep, 2018                    Active

 

                          Nitroglycerin 0.4 MG/HR   1 patch to skin Transdermal 

Daily as needed per 

                      23 Oct, 2018                    Active

 

                          traMADol HCl 50 MG        1 tab orally/[id number] three

 times daily as needed for pain 

mdd3 for 30 days                                                 Active

 

           Acetaminophen 500 MG 1 tablet as needed Orally every 6 hrs           

                       Active

 

           Pantoprazole Sodium 40 MG 1 tablet Orally Once a day for 30 day(s)   

                               Active

 

           Zinc 50 MG 1 tablet Orally Once a day for 30 day(s)                  

                Active

 

           Valsartan 80 MG 2 tablet Orally                                  Acti

ve

 

           Ranitidine 150 Max Strength 150 MG 1 tablet Orally twice daily       

                           Not-Taking

 

           Allopurinol 300 MG 1 tablet Orally Once a day                        

          Active

 

           Ranexa 1000 MG 1 tablet Orally Twice a day for 90 day(s)             

                     Active

 

           Probiotic - 1 capsule Orally Daily                                  A

ctive

 

           Losartan Potassium-HCTZ 50-12.5 MG 1 tablet Orally Once a day for 90 

days                                  

Not-Taking

 

           Magnesium 65 MG 2 tablets with a meal Orally Once a day              

                    Active

 

           Promethazine HCl 25 mg 1 tablet Orally before bedtime as needed      

                            Active

 

           Plavix 75 mg 1 tablet Orally Once a day for 90 days                  

                Active

 

                          Nitroglycerin 0.4 MG      1 tab Sublingual atonset of 

chest pain q 5 min x 3 if no 

relief call 911                                                 Active

 

           Benadryl 25 MG 1 capsule Orally every 12 hours as needed             

                     Active

 

                          Meclizine HCl 25 mg       1 tablet Orally every 8 hour

s as needed for dizziness for 30

day(s)                          27 Dec, 2018                    Active

 

           Lexapro 10 MG 1 tab Orally Once a day                                

  Active

 

                          Amlodipine Besylate 2.5 MG TAKE 1TAB.BY MOUTH TWICE A 

DAY  HOLD DOSE FOR 

SYSTOLIC BLOOD PRESSURE   135 Oral Takeif B/P is over 160 systolic              

                                   Active

 

           Vitamin B12 3000 MCG 1 tablet Sublingual Once a day                  

                Active







PROCEDURES

No Information



RESULTS

No Results



REASON FOR VISIT

refills - out of meds



MEDICAL (GENERAL) HISTORY





                    Type                Description         Date

 

                    Medical History     No MRI's due to stents in legs and heart

  

 

                          Medical History           Carotid Artery Disease nonco

nclusive Lexiscan cardiolite stress 

test, normal myocardial perfusion, preserved LV function 2016 Hx MI, Dr Guerrero                                   

 

                    Medical History     peripheral vascular disease (carotids, l

ower extremities)  

 

                    Medical History     CVA                  

 

                    Medical History     Hypercholesterolemia  

 

                    Medical History     Bradycardia          

 

                    Medical History     congestive heart failure ? type  

 

                    Medical History     essential hypertension  

 

                    Medical History     syncopal event x1 13  

 

                    Medical History     Migraines            

 

                          Medical History           Sleep apneauses oxygen at 2 

liters q hour sleep does not toerate

cpap                                     

 

                    Medical History     GERD                 

 

                    Medical History     Anxiety/Depression   

 

                    Medical History     fibromyalgia         

 

                    Medical History     colonoscopy 2016 with hemorrhoids a

nd diverticulosis  

 

                          Medical History           papillary urothelial carcino

ma cystoscopy 2018 Dr. Dom Juarez negative cystoscopy 2017  

 

                          Medical History           recurrent C. difficile colit

is C. difficile positive on 18 

negative on 3/8/18 positive on 17 positive on 2013  

 

                    Medical History     degenerative disc disease and spondylosi

s  

 

                    Medical History     hgac  5.2=103    , hga1c 5 2019 

 

 

                    Medical History     Solitary Kidney right kidney  ( left rem

lorena from cancer)  

 

                    Surgical History    triple bypass       

 

                    Surgical History    multiple procedures with cardiac stents 

 [facility] [id number]

 

                    Surgical History    back surgery        1974

 

                    Surgical History    stent right leg     

 

                    Surgical History    right nephrectomy [facility] 

 

                    Surgical History    lumbar 4-5m         

 

                    Surgical History    colon resection-[facility] 3/2018

 

                    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

 

                    Hospitalization History blood transfusion   

 

                    Hospitalization History Kidney stone         

 

                    Hospitalization History for surgeries        







Goals Section

No Information



Health Concerns

No Information



MEDICAL EQUIPMENT

No Information



MENTAL STATUS

No Information



FUNCTIONAL STATUS

No Information



ASSESSMENTS





             Encounter Date Diagnosis    Assessment Notes Treatment Notes Treatm

ent Clinical 

Notes

 

                14 Sep, 2021    Anxiety (ICD-10 - F41.9)                 



                                        











PLAN OF TREATMENT

Medication



                Medication Name Sig             Start Date      Stop Date

 

                Allopurinol 300 MG 1 tablet Orally Once a day                  

 

                Lexapro 10 MG   1 tab Orally Once a day                  

 

                Valsartan 80 MG 2 tablet Orally                  



Next Appt



                                        Details

 

                                        Provider Name:Kailee West, 2021-10-26 03:

00:00 PM, [address] [facility],

[phone], [place], [place], [facility], [phone]







Insurance Providers





             Payer Name   Payer Address Payer Phone  Insured Name Patient Relati

onship to 

Insured                   Coverage Start Date       Coverage End Date

 

                          [employer]  [facility] [employer] 

[address] [facility] 

[id number] [phone] LORI JACOBO                       

 

                MEDICARE Part A and B [address]  [facility] [id number]

[phone]    

LORI JACOBO

## 2021-10-26 NOTE — CCD
Summarization of Episode Note

                             Created on: 09/15/2021



Dr. LORI JACOBO

External Reference #: [id number]

: 1945

Sex: Female



Demographics





                          Address                   [address] 

[place]  [address]

 

                          Home Phone                [phone]

 

                          Preferred Language        Unknown

 

                          Marital Status            Unknown

 

                          Adventism Affiliation     Unknown

 

                          Race                      White

 

                          Ethnic Group              Not  or 





Author





                          Author                    [facility]

ems

 

                          Organization              [facility]

ems

 

                          Address                   Unknown

 

                          Phone                     Unavailable







Support





                Name            Relationship    Address         Phone

 

                    LORI JACOBO                [address] 

[place]  [address]                  [phone]

 

                Trevin [place]  [address] [phone]







Care Team Providers





                    Care Team Member Name Role                Phone

 

                    Kailee West        Unavailable         [phone]







PROBLEMS





          Type      Condition ICD9-CM Code XQQ73-FV Code Onset Dates Condition S

tatus W/U 

Status              Risk                SNOMED Code         Notes

 

       Problem Obstructive sleep apnea        G47.33        Active confirmed    

    [id number]  

 

       Problem Anxiety        F41.9         Active confirmed        [id number] She

 is on Lexapro therapy 

at 20 g daily but she feels that is too strong. She will cut back to 10 mg 
daily.

 

       Problem Essential hypertension        I10           Active confirmed     

   39151875  

 

                          Problem                   Coronary artery disease of b

ypass graft of native heart with stable 

angina pectoris         I25.708         Active  confirmed         [id number] She 

has coronary artery

disease with a history of interventions. She is complaining of dyspnea on 
exertion. She does not desaturate with ambulation. She is on a beta blocker, 
ARB, Ranexa, aspirin and Plavix therapy. She indicates that she had a stress 
test within the last year or so. I've asked her cardiologist to consider 
reassessment of her coronary anatomy and pulmonary pressures in the near future.

 

       Problem Lumbosacral spondylolysis        M43.07        Active confirmed  

      [id number]  

 

       Problem Sleep apnea in adult        G47.30        Active confirmed       

 [id number]  

 

                          Problem                   Hypertensive heart and chron

ic kidney disease with heart failure and 

stage 1 through stage 4 chronic kidney disease, or unspecified chronic kidney 
disease         I13.0           Active  confirmed         [id number] She did not 

do well with a change

from her valsartan and hydrochlorothiazide to irbesartan therapy and is back on 
the former with reasonable blood pressure control. I think the 
hydrochlorothiazide was stopped because of her chronic renal disease/solitary 
kidney but I doubt that the low dose of the thiazide has substantially affected 
her kidney function. She will keep her nephrology follow-up.

 

          Problem   Gastro-esophageal reflux disease with esophagitis           

K21.0               Active    

confirmed                               [id number]            

 

          Problem   Other osteoarthritis of spine, lumbar region           M47.8

96             Active    

confirmed                               [id number]            

 

        Problem Stenosis of left carotid artery         I65.22          Active  

confirmed         

[id number]                          

 

       Problem Coronary arteriosclerosis        I25.10        Active confirmed  

      52602674  

 

          Problem   DDD (degenerative disc disease), lumbosacral           M51.3

7              Active    confirmed

                                        [id number]             

 

                Problem         Chronic gout due to renal impairment without top

hus, unspecified site                 

M1A.30X0              Active     confirmed             [id number]  

 

       Problem Mixed hyperlipidemia        E78.2         Active confirmed       

 701557422  

 

       Problem Fibromyalgia        M79.7         Active confirmed        2432351

05  

 

        Problem Hx of malignant neoplasm of kidney         Z85.528         Activ

e  confirmed         

[id number]                                

 

       Problem Amaurosis fugax, both eyes        G45.3         Active confirmed 

       92667527  

 

       Problem Vitamin D deficiency        E55.9         Active confirmed       

 52310580  

 

          Problem   History of non anemic vitamin B12 deficiency           Z86.3

9              Active    confirmed

                                        [id number]            

 

        Problem PVD (peripheral vascular disease)         I73.9           Active

  confirmed         [id number]

                                         

 

       Problem Other chronic pain        G89.29        Active confirmed        8

0498221  

 

          Problem   Recurrent colitis due to Clostridium difficile           A04

.71              Active    

confirmed                               [id number]            

 

           Problem    Malignant neoplasm of urinary bladder, unspecified site   

         C67.9                 Active

                confirmed                       [id number]        

 

       Problem Dizziness        R42           Active confirmed        064322799 

 

 

                Problem         Chronic congestive heart failure, unspecified he

art failure type                 I50.9

                      Active     confirmed             11841335    

 

       Problem Solitary kidney, acquired        Z90.5         Active confirmed  

      [id number]  

 

       Problem Hypercholesterolemia        E78.00        Active confirmed       

 [id number]  







ALLERGIES





                    Allergen (clinical drug ingredient) Drug/Non Drug Allergy do

cumented on EMR 

Reaction            Allergy Type        Onset Date          Status

 

           tetracycline Tetracycline HCl(NDC Code:[address]-89) diarrhea   Drug

 Allergy            

Active

 

                      BuSpar     Unknown    Drug Allergy            Active

 

                      Minocins   diarrhea   Non Drug Allergy            Active

 

           nitazoxanide Alinia(NDC Code:[address]-76) Hives      Drug Allergy  

          Active

 

                      nitrostat  Anaphylaxis Non Drug Allergy            Active

 

           evolocumab Repatha(NDC Code:[address]-34) Rash       Drug Allergy   

         Active

 

                      Statins (for Allergy Use Only) muscle soreness weakness Dr

ug Allergy            Active

 

           carvedilol Carvedilol(NDC Code:[address]-67) Unknown    Drug Allergy

            Active

 

                      Codeine Phosphate (For Allergies Use Only) Hives      Drug

 Allergy            Active

 

                      Butabarbital Sodium Hives      Drug Allergy            Act

juany

 

                sulfamethoxazole / trimethoprim Bactrim(NDC Code:[address]-52) 

Rash            Drug 

Allergy                                             Active

 

                      Trental    sick       Drug Allergy            Active

 

                      Adhesive Tape Hives      Drug Allergy            Active







ENCOUNTERS from 1945 to 2021





             Encounter    Location     Date         Provider     Diagnosis

 

                    [facility] [phone] [place] [address] 14 Sep, 2021

                          Kailee West                Anxiety F41.9







IMMUNIZATIONS





                Vaccine         Route           Administration Date Status

 

                Influenza 18 yrs & older Flublok IM Intramuscular Dec 20, 2018  

  Administered

 

                Vitamin B-12 1000mcg/1mL Cyanocobalamin IM Intramuscular Aug 28,

 2019    

Administered

 

                Pneumococcal Adult 0.5mL Pneumovax 23 Unknown         

013   Administered

 

                Influenza 6mo & up Fluzone Unknown         Dec 08, 2015    Refus

ed







SOCIAL HISTORY

Tobacco Use:



                    Social History Observation Description         Date

 

                    Details (start date - stop date) Never Smoker         



Sex Assigned At Birth:



                          Social History Observation Description

 

                          Sex Assigned At Birth     Unknown



Education:



                    Question            Answer              Notes

 

                    Level of Education: Not finished High School  



Audit



                    Question            Answer              Notes

 

                    Total Score:        0                    

 

                    Interpretation:     Alcohol Education    



Language:



                    Question            Answer              Notes

 

                    Languages spoken:   English              



Scientologist:



                    Question            Answer              Notes

 

                    Scientologist            [employer]         



Sexual Hx:



                    Question            Answer              Notes

 

                    Had sex in the last 12 months (vaginal, oral, or anal)? No  

                 

 

                    Have you ever had an STD? No                   



Drug and Alcohol



                    Question            Answer              Notes

 

                    Total Score:        0                    

 

                    Interpretation:     No problems reported  



Alcohol Screening:



                    Question            Answer              Notes

 

                    Did you have a drink containing alcohol in the past year? No

                   

 

                    Points              0                    

 

                    Interpretation      Negative             



Tobacco Use:



                    Question            Answer              Notes

 

                    Are you a:          never smoker        former smoker

 

                    Additional Findings: Tobacco User                     quit 5

 years ago







REASON FOR REFERRAL

No Information



VITAL SIGNS

No information



MEDICATIONS





           Medication SIG (Take, Route, Frequency, Duration) Notes      Start Da

te End Date   

Status

 

           Vitamin D3 125 MCG (5000 UT) 1 capsule Orally weekly                 

                 Active

 

           Metoprolol Tartrate 25 mg 1 tab Orally twice daily                   

               Active

 

           Aspirin 325 MG 1 tablet Orally Once a day                            

      Active

 

                          May Have -                please provide new tubing fo

r 2 concentrator. DX code:G47.33 to use 

daily for 99 months                 11 Sep, 2018                    Active

 

                          Nitroglycerin 0.4 MG/HR   1 patch to skin Transdermal 

Daily as needed per 

                      23 Oct, 2018                    Active

 

                          traMADol HCl 50 MG        1 tab orally/[id number] three

 times daily as needed for pain 

mdd3 for 30 days                                                 Active

 

           Acetaminophen 500 MG 1 tablet as needed Orally every 6 hrs           

                       Active

 

           Pantoprazole Sodium 40 MG 1 tablet Orally Once a day for 30 day(s)   

                               Active

 

           Zinc 50 MG 1 tablet Orally Once a day for 30 day(s)                  

                Active

 

           Valsartan 80 MG 2 tablet Orally                                  Acti

ve

 

           Ranitidine 150 Max Strength 150 MG 1 tablet Orally twice daily       

                           Not-Taking

 

           Allopurinol 300 MG 1 tablet Orally Once a day                        

          Active

 

           Ranexa 1000 MG 1 tablet Orally Twice a day for 90 day(s)             

                     Active

 

           Probiotic - 1 capsule Orally Daily                                  A

ctive

 

           Losartan Potassium-HCTZ 50-12.5 MG 1 tablet Orally Once a day for 90 

days                                  

Not-Taking

 

           Magnesium 65 MG 2 tablets with a meal Orally Once a day              

                    Active

 

           Promethazine HCl 25 mg 1 tablet Orally before bedtime as needed      

                            Active

 

           Plavix 75 mg 1 tablet Orally Once a day for 90 days                  

                Active

 

                          Nitroglycerin 0.4 MG      1 tab Sublingual atonset of 

chest pain q 5 min x 3 if no 

relief call 911                                                 Active

 

           Benadryl 25 MG 1 capsule Orally every 12 hours as needed             

                     Active

 

                          Meclizine HCl 25 mg       1 tablet Orally every 8 hour

s as needed for dizziness for 30

day(s)                          27 Dec, 2018                    Active

 

           Lexapro 10 MG 1 tab Orally Once a day                                

  Active

 

                          Amlodipine Besylate 2.5 MG TAKE 1TAB.BY MOUTH TWICE A 

DAY  HOLD DOSE FOR 

SYSTOLIC BLOOD PRESSURE   135 Oral Takeif B/P is over 160 systolic              

                                   Active

 

           Vitamin B12 3000 MCG 1 tablet Sublingual Once a day                  

                Active







PROCEDURES

No Information



RESULTS

No Results



REASON FOR VISIT

error



MEDICAL (GENERAL) HISTORY





                    Type                Description         Date

 

                    Medical History     No MRI's due to stents in legs and heart

  

 

                          Medical History           Carotid Artery Disease nonco

nclusive Lexiscan cardiolite stress 

test, normal myocardial perfusion, preserved LV function 2016 Hx MI, Dr Guerrero                                   

 

                    Medical History     peripheral vascular disease (carotids, l

ower extremities)  

 

                    Medical History     CVA                  

 

                    Medical History     Hypercholesterolemia  

 

                    Medical History     Bradycardia          

 

                    Medical History     congestive heart failure ? type  

 

                    Medical History     essential hypertension  

 

                    Medical History     syncopal event x1 13  

 

                    Medical History     Migraines            

 

                          Medical History           Sleep apneauses oxygen at 2 

liters q hour sleep does not toerate

cpap                                     

 

                    Medical History     GERD                 

 

                    Medical History     Anxiety/Depression   

 

                    Medical History     fibromyalgia         

 

                    Medical History     colonoscopy 2016 with hemorrhoids a

nd diverticulosis  

 

                          Medical History           papillary urothelial carcino

ma cystoscopy 2018 Dr. Dom Juarez negative cystoscopy 2017  

 

                          Medical History           recurrent C. difficile colit

is C. difficile positive on 18 

negative on 3/8/18 positive on 17 positive on 2013  

 

                    Medical History     degenerative disc disease and spondylosi

s  

 

                    Medical History     hgac  5.2=103    , hga1c 5 2019 

 

 

                    Medical History     Solitary Kidney right kidney  ( left rem

lorena from cancer)  

 

                    Surgical History    triple bypass       

 

                    Surgical History    multiple procedures with cardiac stents 

 [facility] [id number]

 

                    Surgical History    back surgery        

 

                    Surgical History    stent right leg     

 

                    Surgical History    right nephrectomy [place]  [facility] 

 

                    Surgical History    lumbar 4-5m         

 

                    Surgical History    colon [facility]/2018

 

                    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

 

                    Hospitalization History blood transfusion   

 

                    Hospitalization History Kidney stone         

 

                    Hospitalization History for surgeries        







Goals Section

No Information



Health Concerns

No Information



MEDICAL EQUIPMENT

No Information



MENTAL STATUS

No Information



FUNCTIONAL STATUS

No Information



ASSESSMENTS





             Encounter Date Diagnosis    Assessment Notes Treatment Notes Treatm

ent Clinical 

Notes

 

                14 Sep, 2021    Anxiety (ICD-10 - F41.9)                 



                                        











PLAN OF TREATMENT

Medication



                Medication Name Sig             Start Date      Stop Date

 

                Allopurinol 300 MG 1 tablet Orally Once a day                  

 

                Lexapro 10 MG   1 tab Orally Once a day                  

 

                Valsartan 80 MG 2 tablet Orally                  



Next Appt



                                        Details

 

                                        Provider Name:Kailee West, 2021-10-26 03:

00:00 PM, [address] [facility],

[phone], [place], [address], [phone]







Insurance Providers





             Payer Name   Payer Address Payer Phone  Insured Name Patient Relati

onship to 

Insured                   Coverage Start Date       Coverage End Date

 

                MEDICARE Part A and B [address]  [facility] [id number]

[phone]    

LORI JACOBO                                     

 

                          [facility] [employer]  [facility] CLAIM DIV 

[address] [facility] 

[id number] [phone] LORI JACOBO

## 2021-10-26 NOTE — CCD
Continuity of Care Document (CCD)

                             Created on: 2021



Eryn Jacobo

External Reference #: [id number]

: 1945

Sex: Female



Demographics





                          Address                   [address]

[place] [id number]

 

                          Home Phone                [phone]

 

                          Preferred Language        Unknown

 

                          Marital Status            Unknown

 

                          Episcopal Affiliation     Unknown

 

                          Race                      White

 

                          Ethnic Group              Not  or 





Author





                          Author                    Chanel MEDINA Eryn M

 

                          Organization              Unknown

 

                          Address                   [address] [place] [address], [address]

[place]  [address]



 

                          Phone                     [phone]







Care Team Providers





                    Care Team Member Name Role                Phone

 

                    Sumaya Martinez PA-C [facility]                [phone]

 

                    José Luis Short MD  AUTM                [phone]

 

                    Tera Morgan MD [facility]                [phone]

 

                    Halle Fallon RN ANP  [facility]                [phone]







Problems





                    Active Problems     Provider            Date

 

                    History of coronary artery bypass grafting Dennis Ballard MD Onset: 

06/15/2021

 

                          Patient post percutaneous transluminal coronary angiop

lasty Dennis Ballard MD

                                        Onset: 06/15/2021

 

                    Essential hypertension Dennis Ballard MD Onset: 06/15/202

1

 

                    Aortic valve disorder Dennis Ballard MD Onset: 06/15/2021

 

                    Chest pain          Dennis Ballard MD Onset: 06/15/2021

 

                    Mixed hyperlipidemia Dennis Ballard MD Onset: 06/15/2021

 

                    Dietary management surveillance Dennis Ballard MD Onset: 

06/15/2021

 

                          Coronary arteriosclerosis after percutaneous coronary 

angioplasty Dennis Ballard MD                             Onset: 06/15/2021

 

                    Overweight          Dennis Ballard MD Onset: 06/15/2021

 

                    Electrocardiogram abnormal Dennis Ballard MD Onset: 06/15

/2021

 

                          Right bundle branch block AND left anterior fascicular

 block Dennis Ballard MD                                      Onset: 06/15/2021

 

                    Old myocardial infarction Dennis Ballard MD Onset: 06/15/

2021

 

                    Palpitations        Dennis Ballard MD Onset: 06/15/2021

 

                    Syncope and collapse Dennis Ballard MD Onset: 06/15/2021

 

                    Dizziness and giddiness Dennis Ballard MD Onset: 06/15/20

21







Social History





                Type            Date            Description     Comments

 

                Birth Sex                       Unknown          

 

                ETOH Use                        Rarely consumes alcohol  

 

                Tobacco Use     Start: Unknown End: Unknown Patient is a former 

smoker started at 

age 9, at most 1 ppd, quit in  

 

                Smoking Status  Reviewed: 07/15/21 Patient is a former smoker st [facility] at age 9, 

at most 1 ppd, quit in  

 

                Exercise Type/Frequency                 Does not exercise curren

tly  

 

                Exercise Limitations                 Back Pain        

 

                Exercise Limitations                 Joint Pain      bilateral a

marjorie and legs 

 

                Exercise Limitations                 Claudication     







Allergies, Adverse Reactions, Alerts





             Active Allergies Reaction     Severity     Comments     Date

 

             Amoxicillin                                         06/15/2021

 

             Atorvastatin                                        06/15/2021

 

             Tetracycline                                        06/15/2021

 

             Buspirone                                           06/15/2021

 

             Adhesives                                           06/15/2021

 

             Alinia                                              06/15/2021

 

             Carvedilol                             Nausea       07/15/2021

 

             Repatha                                Muscle Cramps 07/15/2021







Medications





           Active Medications SIG        Qnty       Indications Ordering Provide

r Date

 

                          Amlodipine Besylate                     2.5mg Tablets 

                  1 by 

mouth twice a day (hold dose for systolic BP <135) 180tabs                      

           Dennis Ballard MD                                      07/15/2021

 

                          Metoprolol Tartrate                     25mg Tablets  

                 Half 

tablet by mouth twice a day                 I10             Unknown         

 

                          Nitroglycerin                     0.6mg/HR Patches 24H

R                   1 

patch applied to skin every day (keep on 14 hours daily then remove) 30units    

               

I25.10                    Dennis Ballard MD      06/15/2021

 

                          Tylenol Extra Strength                     500mg Table

ts                   2 by 

mouth twice daily as needed                                 Unknown         

 

                          Allopurinol                     300mg Tablets         

          1 by mouth every

day                                             Unknown         2021

 

                                        Vitamin D (Ergocalciferol)              

       1.25mg (16634 Ut) Capsules       

             1 by mouth every weekly                           Unknown      

 

                                        Vitamin Deficiency Injectable System-B12

                     1000mcg/ML Kit     

             inject 1 kit once monthly                           Unknown      

 

                          Ranexa                     500mg Tablets ER 12HR      

             2 by mouth 

twice a day                                     Unknown         2021

 

                          Meclizine HCL                     25mg Chewtabs       

            1 by mouth 

four times daily as needed                                 Unknown         

 

                          Metoclopramide HCL                     10mg Tablets   

                1 by mouth

four times daily                                 Unknown         2021

 

                          Pantoprazole Sodium                     40mg Tablets D

R                   2 by 

mouth every day                                 Unknown         2021

 

                          Tramadol HCL                     50mg Tablets         

          2 by mouth every

8 hours as needed, as directed                                 Unknown         0

2021

 

                    Plavix                     75mg Tablets                   1 

by mouth every day  

                                        Unknown             2021

 

                          Aspirin                     325mg Tablets DR          

         1 by mouth every 

day                                             Unknown         2021

 

                          Nitroglycerin                     0.4mg Tablets Sub   

                1 tab sl 

every 5 min times 3 doses as needed chest discomfort                            

     Unknown         2021

 

                          Febuxostat                     40mg Tablets           

        1 by mouth every 

daily                                           Unknown         2021

 

                          Escitalopram Oxalate                     10mg Tablets 

                  1 by 

mouth every day                                 Unknown         2021

 

                          Valsartan                     80mg Tablets            

       2 by mouth daily at

bedtime                                         Unknown         2021

 

                                        Albuterol Sulfate HFA                   

  108(90Base) mcg/Act Aerosol           

             inhale two puffs as needed every 4 hours                           

Unknown      2021

 

                                        History Medications

 

                                        Repatha Pushtronex System               

      420mg/3.5ML Solution Cartridge    

              420 mg (3.5 ml) sq qmo 10.5ml       E78.2        Dennis Ballard MD 

06/15/2021 - 2021

 

                          Carvedilol                     3.125mg Tablets        

           1 by mouth 

twice a day     60tabs          I10             Dennis Ballard MD 06/15/2021 

- 2021

 

                                        I25.2

 

                                        R07.9

 

                          Amlodipine Besylate                     2.5mg Tablets 

                  1 by 

mouth every day as needed for elevated BP                                 Unknow

n         2021 - 07/15/2021

 

                          Metoprolol Tartrate                     25mg Tablets  

                 1 by 

mouth twice a day                 I10             Unknown         2021 - 0

6/15/2021

 

                                        I25.2

 

                                        R07.9

 

                          Covid-19 vaccine, Unspecified                      Inj

ection                    

                                                Unknown         







Immunizations





                                        Description

 

                                        No Information Available







Vital Signs





                Date            Vital           Result          Comment

 

                07/15/2021 10:44am Weight          155.00 lb        

 

                    Home Weight         153lb               home weight

 

                    Height              61 inches           5'1"

 

                    BMI (Body Mass Index) 29.3 kg/m2           

 

                    Heart Rate          55 /min              

 

                    BP Systolic Sitting 148 mmHg            CBP, adult cuff/Ra

 

                    BP Diastolic Sitting 63 mmHg             CBP, adult cuff/Ra

 

                06/15/2021 12:15pm Weight          157.00 lb        

 

                    Home Weight         157lb               home weight

 

                    Height              61 inches           5'1"

 

                    BMI (Body Mass Index) 29.7 kg/m2           

 

                    Heart Rate          56 /min              

 

                    BP Systolic Sitting 152 mmHg            CBP, adult cuff/Ra

 

                    BP Diastolic Sitting 63 mmHg             CBP, adult cuff/Ra







Results





        Test    Acquired Date Facility Test    Result  H/L     Range   Note

 

                    Lipid Panel         2021          [facility]

nter

           [phone] Triglycerides Level 316 mg/dL  High       <150        

 

             Cholesterol Level 191 mg/dL    Normal       <200          

 

             HDL Cholesterol 43 mg/dL     Normal       >40           

 

             LDL Cholesterol 85 mg/dL     Normal       <100          

 

             Non-HDL-C    148 mg/dL    Normal                     

 

             Cholesterol Risk Ratio 4.441        Normal       <5            

 

                    Laboratory test finding 2021          [facility]

           [phone] Vitamin B12 Level 689 pg/mL  Normal     247-911    1

 

                    CMP                 2021          [facility]

             Albumin Serum/Plasma 4.1                                     

 

             Alt - SGPT   31                                      

 

             Calcium Ser/Plasma Mass/Vol 9.5                                    

 

 

             Carbon Dioxide Ser/Plasm 24                                      

 

             Chloride Serum/Plasma 105                                     

 

             Alkaline Phosphatase 52                                      

 

             Potassium    4.4                                     

 

             Protein Total 7.8                                     

 

             Sodium       140                                     

 

             Ast - Sgot   18                                      

 

             BUN - Urea Nitrogen 30                                      

 

             Glucose      96                                      

 

             Creatinine For GFR 1.69                                    

 

                    CBC without Differential 2021          [facility]

e

             White Blood Count 8.1                                     

 

             Red Blood Count 3.05                                    

 

             Platelets    173                                     

 

             Hemoglobin   11.0                                    

 

             Hematocrit   32.4                                    

 

                    Laboratory test finding 2021          [facility]

             Thyroid Stimulating Hormone 2.105                                  

 







                          1                         VITAMIN B12 NORMAL RANGE



NORMAL                     247 - 911 PG/ML

INDETERMINATE              211 - 246 PG/ML

DEFICIENT              LESS THAN 211 PG/ML









Procedures





                Date            Code            Description     Status

 

                07/15/2021      90646           Office/Outpatient Established Lo

w MDM 20-29 Min Completed

 

                    07/15/2021          37500               Arterial Pressure Wa

veform Analysis For Assessment Of Central 

Art                                     Completed

 

                06/15/2021      99966           Office/Outpatient New Moderate M

DM 45-59 Minutes Completed

 

                    06/15/2021          25997               Arterial Pressure Wa

veform Analysis For Assessment Of Central 

Art                                     Completed

 

                06/15/2021      29340           ECG 12-Lead     Completed







[employer]





                                        Description

 

                                        No Information Available







Encounters





           Type       Date       Location   Provider   Dx         Diagnosis

 

           Office Visit 07/15/2021 11:00a Main Office Dennis Ballard MD I25.1

0     [facility] 

heart disease of native coronary artery w/o ang pctrs

 

                          I10                       Essential (primary) hyperten

ayse

 

           Office Visit 06/15/2021 12:00p Main Office Dennis Ballard MD I25.1

0     [facility] 

heart disease of native coronary artery w/o ang pctrs

 

                          I25.2                     Old myocardial infarction

 

                          Z95.1                     Presence of aortocoronary by

pass graft

 

                          Z95.5                     Presence of coronary angiopl

asty implant and graft

 

                          R07.9                     Chest pain, unspecified

 

                          I10                       Essential (primary) hyperten

ayse

 

                          I35.0                     Nonrheumatic aortic (valve) 

stenosis

 

                          R94.31                    Abnormal electrocardiogram [

ECG] [EKG]

 

                          R55                       Syncope and collapse

 

                          I45.2                     Bifascicular block

 

                          R00.2                     Palpitations

 

                          R42                       Dizziness and giddiness

 

                          E78.2                     Mixed hyperlipidemia

 

                          E66.3                     Overweight

 

                          Z71.3                     Dietary counseling and surve

illance







Assessments





                Date            Code            Description     Provider

 

                    07/15/2021          I25.10              Atherosclerotic hear

t disease of native coronary artery 

without angina pectoris                 Dennis Ballard MD

 

                07/15/2021      I10             Essential (primary) hypertension

 Dennis Ballard MD

 

                    06/15/2021          I25.10              Atherosclerotic hear

t disease of native coronary artery 

without angina pectoris                 Dennis Ballard MD

 

                06/15/2021      I25.2           Old myocardial infarction Dennis Ballard MD

 

                06/15/2021      Z95.1           Presence of aortocoronary bypass

 graft Dennis Ballard MD

 

                06/15/2021      Z95.5           Presence of coronary angioplasty

 implant and graft Dennis Ballard MD

 

                06/15/2021      R07.9           Chest pain, unspecified Dennis Ballard MD

 

                06/15/2021      I10             Essential (primary) hypertension

 Dennis Ballard MD

 

                06/15/2021      I35.0           Nonrheumatic aortic (valve) sten

osis eDnnis Ballard MD

 

                06/15/2021      R94.31          Abnormal electrocardiogram [ECG]

 [EKG] Dennis Ballard MD

 

                06/15/2021      R55             Syncope and collapse Dennis soria MD

 

                06/15/2021      I45.2           Bifascicular block Dennis henderson MD

 

                06/15/2021      R00.2           Palpitations    Dennis Ballard MD

 

                06/15/2021      R42             Dizziness and giddiness Dennis Ballard MD

 

                06/15/2021      E78.2           Mixed hyperlipidemia Dennis soria MD

 

                06/15/2021      E66.3           Overweight      Dennis Ballard MD

 

                06/15/2021      Z71.3           Dietary counseling and surveilla

nce Dennis Ballard MD







Plan of Treatment

Future Appointment(s):* 2021 12:15 pm - Dennis Ballard MD at Main 
  Office

* 2021  1:00 pm - ECHO at Main Office

07/15/2021 - Dennis Ballard MD* I25.10 Atherosclerotic heart disease of 
  native coronary artery without angina pectoris* New Xrays:* PET Myocardial 
  Perfusion Multi Study AT [facility], Scheduled: 08/10/21



* Recommendations:* Now that my office has access to cardiac PET myocardial 
  perfusion imaging which is a superior diagnostic modality in comparison to 
  stress SPECT myocardial perfusion imaging, I have canceled the stress SPECT 
  myocardial perfusion imaging study that I ordered previously and ordered 
  cardiac PET myocardial perfusion imaging instead (to define her coronary 
  prognosis). Metoprolol tartrate was decreased from 25 mg twice a day down to 
  12.5 mg twice a day with the hope of providing a faster heart rate which in 
  turn should improve the supply/demand ratio as evidenced in the observed 
  central BP waveform analysis today. Continue nitroglycerin patch, valsartan, 
  amlodipine, nitroglycerin sublingual, aspirin, clopidogrel, Ranexa.





* I10 Essential (primary) hypertension* Recommendations:* Metoprolol tartrate 
  was decreased to 12.5 mg twice a day. Continue valsartan and nitroglycerin 
  patch at the current dosages. Amlodipine 2.5 mg twice a day when necessary for
   systolic BP >160 as the patient is currently taking it was changed to 
  amlodipine 2.5 mg twice a day with instruction to hold dose for systolic BP <
  135 mmHg.





* All * New Medication:* Amlodipine Besylate 2.5 mg - 1 by mouth twice a day 
  (hold dose for systolic BP <135)



* Follow up:* Clinic visit in 8 weeks with Dr. Ballard.









Functional Status





                Functional Condition Comment         Date            Status

 

                Independent with all ADL's                                 Activ

e







Mental Status





                                        Description

 

                                        No Information Available







Referrals





                                        Description

 

                                        No Information Available

## 2021-10-26 NOTE — CCD
Continuity of Care Document (CCD)

                             Created on: 2021



DonnellEryn TIM

External Reference #: [id number]

: 1945

Sex: Female



Demographics





                          Address                   [address]

[place]  [address]

 

                          Home Phone                [phone]

 

                          Preferred Language        Unknown

 

                          Marital Status            Unknown

 

                          Confucianism Affiliation     Unknown

 

                          Race                      White

 

                          Ethnic Group              Not  or 





Author





                          Eryn Loo

 

                          Organization              Unknown

 

                          Address                   [address] [place] [address], [address]

[place]  [address]



 

                          Phone                     [phone]







Care Team Providers





                    Care Team Member Name Role                Phone

 

                    Sumaya Martinez PA-C AUTM                [phone]

 

                    José Luis Short MD  AUTM                [phone]

 

                    Tera Morgan MD AUTM                [phone]

 

                    Halle Fallon RN ANP  AUTM                [phone]







Problems





                    Active Problems     Provider            Date

 

                    History of coronary artery bypass grafting Dennis Ballard MD Onset: 

06/15/2021

 

                          Patient post percutaneous transluminal coronary angiop

lasty Dennis Ballard MD

                                        Onset: 06/15/2021

 

                    Essential hypertension Dennis Ballard MD Onset: 06/15/202

1

 

                    Aortic valve disorder Dennis Ballard MD Onset: 06/15/2021

 

                    Chest pain          Dennis Ballard MD Onset: 06/15/2021

 

                    Mixed hyperlipidemia Dennis Ballard MD Onset: 06/15/2021

 

                    Dietary management surveillance Dennis Ballard MD Onset: 

06/15/2021

 

                          Coronary arteriosclerosis after percutaneous coronary 

angioplasty Dennis Ballard MD                             Onset: 06/15/2021

 

                    Overweight          Dennis Ballard MD Onset: 06/15/2021

 

                    Electrocardiogram abnormal Dennis Ballard MD Onset: 06/15

/2021

 

                          Right bundle branch block AND left anterior fascicular

 block Dennis Ballard MD                                      Onset: 06/15/2021

 

                    Old myocardial infarction Dennis Ballard MD Onset: 06/15/

2021

 

                    Palpitations        Dennis Ballard MD Onset: 06/15/2021

 

                    Syncope and collapse Dennis Ballard MD Onset: 06/15/2021

 

                    Dizziness and giddiness Dennis Ballard MD Onset: 06/15/20

21







Social History





                Type            Date            Description     Comments

 

                Birth Sex                       Unknown          

 

                ETOH Use                        Rarely consumes alcohol  

 

                Tobacco Use     Start: Unknown End: Unknown Patient is a former 

smoker started at 

age 9, at most 1 ppd, quit in  

 

                Smoking Status  Reviewed: 07/15/21 Patient is a former smoker st

[place] at age 9, 

at most 1 ppd, quit in  

 

                Exercise Type/Frequency                 Does not exercise curren

tly  

 

                Exercise Limitations                 Back Pain        

 

                Exercise Limitations                 Joint Pain      bilateral a

marjorie and legs 

 

                Exercise Limitations                 Claudication     







Allergies, Adverse Reactions, Alerts





             Active Allergies Criticality  Reaction | Severity Comments     Date

 

             Amoxicillin  Unable to assess criticality                          

 06/15/2021

 

             Atorvastatin Unable to assess criticality                          

 06/15/2021

 

             Tetracycline Unable to assess criticality                          

 06/15/2021

 

             Buspirone    Unable to assess criticality                          

 06/15/2021

 

             Adhesives    Unable to assess criticality                          

 06/15/2021

 

             Butalbital   Unable to assess criticality Hives                    

 2021

 

             Alinia       Unable to assess criticality                          

 06/15/2021

 

             Clindamycin/Lincomycin Unable to assess criticality Diarrhea       

           2021

 

             Carvedilol   Unable to assess criticality              Nausea      

 07/15/2021

 

             Nitazoxanide Unable to assess criticality Hives                    

 2021

 

             Repatha      Unable to assess criticality              Muscle Cramp

s 07/15/2021

 

             Statins      Unable to assess criticality                          

 2021

 

             Sulfamethoxazole / Trimethoprim Unable to assess criticality Rash |

 Mild               

2021







Medications





           Active Medications SIG        Qnty       Indications Ordering Provide

r Date

 

                          Praluent                     150mg/ml Solution Auto-In

ject                   

inject 1 milliliters subcutaneous every 2 weeks 3ml             E78.2           

Dennis Ballard MD 

2021

 

                          Promethazine HCL                     25mg Tablets     

              1 by mouth 

every 6 hours as needed                                 Unknown         20

 

                          Zinc Chelated                     50mg Tablets        

           1 by mouth once

daily                                           Unknown         2021

 

                    Probiotic                      Capsules                   1 

by mouth every day  

                                        Unknown             2021

 

             Magnesium                     400mg Tablets                   165 m

g daily                           

Unknown                                 2021

 

                          Metoprolol Tartrate                     25mg Tablets  

                 Half 

tablet by mouth twice a day                 I10             Unknown         

 

                          Nitroglycerin                     0.6mg/HR Patches 24H

R                   1 

patch applied to skin every day (keep on 14 hours daily then remove) 30units    

               

I25.10                    Dennis Ballard MD      06/15/2021

 

                          Tylenol Extra Strength                     500mg Table

ts                   2 by 

mouth twice daily as needed                                 Unknown         

 

                          Allopurinol                     300mg Tablets         

          1 by mouth every

day                                             Unknown         2021

 

                                        Vitamin D (Ergocalciferol)              

       1.25mg (11303 Ut) Capsules       

             1 by mouth every weekly                           Unknown      

 

                                        Vitamin Deficiency Injectable System-B12

                     1000mcg/ML Kit     

             inject 1 kit once monthly                           Unknown      

 

                          Ranexa                     500mg Tablets ER 12HR      

             2 by mouth 

twice a day                                     Unknown         2021

 

                          Meclizine HCL                     25mg Chewtabs       

            1 by mouth 

four times daily as needed                                 Unknown         

 

                          Pantoprazole Sodium                     40mg Tablets D

R                   2 by 

mouth every day                                 Unknown         2021

 

                          Tramadol HCL                     50mg Tablets         

          2 by mouth every

8 hours as needed, as directed                                 Unknown         0

2021

 

                    Plavix                     75mg Tablets                   1 

by mouth every day  

                                        Unknown             2021

 

                          Aspirin                     325mg Tablets DR          

         1 by mouth every 

day                                             Unknown         2021

 

                          Nitroglycerin                     0.4mg Tablets Sub   

                1 tab sl 

every 5 min times 3 doses as needed chest discomfort                            

     Unknown         2021

 

                          Escitalopram Oxalate                     10mg Tablets 

                  1 by 

mouth every day                                 Unknown         2021

 

                          Valsartan                     80mg Tablets            

       2 by mouth daily at

bedtime                                         Unknown         2021

 

                                        Albuterol Sulfate HFA                   

  108(90Base) mcg/Act Aerosol           

             inhale two puffs as needed every 4 hours                           

Unknown      2021

 

                                        History Medications

 

                          Amlodipine Besylate                     2.5mg Tablets 

                  1 by 

mouth twice a day (hold dose for systolic BP <135) 180tabs                      

           Dennis Ballard MD                                      07/15/2021 - 2021

 

                                        Repatha Pushtronex System               

      420mg/3.5ML Solution Cartridge    

              420 mg (3.5 ml) sq qmo 10.5ml       E78.2        Dennis Ballard MD 

06/15/2021 - 2021

 

                          Carvedilol                     3.125mg Tablets        

           1 by mouth 

twice a day     60tabs          I10             Dennis Ballard MD 06/15/2021 

- 2021

 

                                        I25.2

 

                                        R07.9

 

                          Amlodipine Besylate                     2.5mg Tablets 

                  1 by 

mouth every day as needed for elevated BP                                 Unknow

n         2021 - 07/15/2021

 

                          Febuxostat                     40mg Tablets           

        1 by mouth every 

daily                                           Unknown         2021 - 

 

                          Metoclopramide HCL                     10mg Tablets   

                1 by mouth

four times daily                                 Unknown         2021 - 

 

                          Metoprolol Tartrate                     25mg Tablets  

                 1 by 

mouth twice a day                 I10             Unknown         2021 - 0

6/15/2021

 

                                        I25.2

 

                                        R07.9

 

                          Covid-19 vaccine, Unspecified                      Inj

ection                    

                                                Unknown         







Immunizations





                                        Description

 

                                        No Information Available







Vital Signs





                Date            Vital           Result          Comment

 

                07/15/2021 10:44am Weight          155.00 lb        

 

                    Home Weight         153lb               home weight

 

                    Height              61 inches           5'1"

 

                    BMI (Body Mass Index) 29.3 kg/m2           

 

                    Heart Rate          55 /min              

 

                    BP Systolic Sitting 148 mmHg            CBP, adult cuff/Ra

 

                    BP Diastolic Sitting 63 mmHg             CBP, adult cuff/Ra

 

                06/15/2021 12:15pm Weight          157.00 lb        

 

                    Home Weight         157lb               home weight

 

                    Height              61 inches           5'1"

 

                    BMI (Body Mass Index) 29.7 kg/m2           

 

                    Heart Rate          56 /min              

 

                    BP Systolic Sitting 152 mmHg            CBP, adult cuff/Ra

 

                    BP Diastolic Sitting 63 mmHg             CBP, adult cuff/Ra







Results





        Test    Acquired Date Facility Test    Result  H/L     Range   Note

 

                    Lipid Panel         2021          [facility]

nter

           [phone] Triglycerides Level 316 mg/dL  High       <150        

 

             Cholesterol Level 191 mg/dL    Normal       <200          

 

             HDL Cholesterol 43 mg/dL     Normal       >40           

 

             LDL Cholesterol 85 mg/dL     Normal       <100          

 

             Non-HDL-C    148 mg/dL    Normal                     

 

             Cholesterol Risk Ratio 4.441        Normal       <5            

 

                    Laboratory test finding 2021          [facility]

           [phone] Vitamin B12 Level 689 pg/mL  Normal     247-911    1

 

                    CMP                 2021          [facility]

             Albumin Serum/Plasma 4.1                                     

 

             Alt - SGPT   31                                      

 

             Calcium Ser/Plasma Mass/Vol 9.5                                    

 

 

             Carbon Dioxide Ser/Plasm 24                                      

 

             Chloride Serum/Plasma 105                                     

 

             Alkaline Phosphatase 52                                      

 

             Potassium    4.4                                     

 

             Protein Total 7.8                                     

 

             Sodium       140                                     

 

             Ast - Sgot   18                                      

 

             BUN - Urea Nitrogen 30                                      

 

             Glucose      96                                      

 

             Creatinine For GFR 1.69                                    

 

                    CBC without Differential 2021          [facility]

e

             White Blood Count 8.1                                     

 

             Red Blood Count 3.05                                    

 

             Platelets    173                                     

 

             Hemoglobin   11.0                                    

 

             Hematocrit   32.4                                    

 

                    Laboratory test finding 2021          [facility]

             Thyroid Stimulating Hormone 2.105                                  

 







                          1                         VITAMIN B12 NORMAL RANGE



NORMAL                     247 - 911 PG/ML

INDETERMINATE              211 - 246 PG/ML

DEFICIENT              LESS THAN 211 PG/ML









Procedures





                Date            Code            Description     Status

 

                2021      69203           Echocardiogram 2-D Doppler Color

 Completed

 

                08/10/2021      76885           TM Interpretation & Report Only 

Completed

 

                08/10/2021      90038           Myocardial Imaging (PET) Multipl

e Studies Completed

 

                07/15/2021      85241           Office/Outpatient Established Lo

w MDM 20-29 Min Completed

 

                    07/15/2021          67031               Arterial Pressure Wa

veform Analysis For Assessment Of Central 

Art                                     Completed

 

                06/15/2021      94332           Office/Outpatient New Moderate M

DM 45-59 Minutes Completed

 

                    06/15/2021          22979               Arterial Pressure Wa

veform Analysis For Assessment Of Central 

Art                                     Completed

 

                06/15/2021      52955           ECG 12-Lead     Completed







Medical Devices





                                        Description

 

                                        No Information Available







Encounters





           Type       Date       Location   Provider   Dx         Diagnosis

 

           Office Visit 07/15/2021 11:00a Main Office Dennis Ballard MD I25.1

0     [facility] 

heart disease of native coronary artery w/o ang pctrs

 

                          I10                       Essential (primary) hyperten

ayse

 

           Office Visit 06/15/2021 12:00p Main Office Dennis Ballard MD I25.1

0     [facility] 

heart disease of native coronary artery w/o ang pctrs

 

                          I25.2                     Old myocardial infarction

 

                          Z95.1                     Presence of aortocoronary by

pass graft

 

                          Z95.5                     Presence of coronary angiopl

asty implant and graft

 

                          R07.9                     Chest pain, unspecified

 

                          I10                       Essential (primary) hyperten

ayse

 

                          I35.0                     Nonrheumatic aortic (valve) 

stenosis

 

                          R94.31                    Abnormal electrocardiogram [

ECG] [EKG]

 

                          R55                       Syncope and collapse

 

                          I45.2                     Bifascicular block

 

                          R00.2                     Palpitations

 

                          R42                       Dizziness and giddiness

 

                          E78.2                     Mixed hyperlipidemia

 

                          E66.3                     Overweight

 

                          Z71.3                     Dietary counseling and surve

illance







Assessments





                Date            Code            Description     Provider

 

                2021      I35.0           Nonrheumatic aortic (valve) sten

osis ECHO

 

                    08/10/2021          I25.10              Atherosclerotic hear

t disease of native coronary artery 

without angina pectoris                 Cardiac PET

 

                    07/15/2021          I25.10              Atherosclerotic hear

t disease of native coronary artery 

without angina pectoris                 Dennis Ballard MD

 

                07/15/2021      I10             Essential (primary) hypertension

 Dennis Ballard MD

 

                    06/15/2021          I25.10              Atherosclerotic hear

t disease of native coronary artery 

without angina pectoris                 Dennis Ballard MD

 

                06/15/2021      I25.2           Old myocardial infarction Dennis Ballard MD

 

                06/15/2021      Z95.1           Presence of aortocoronary bypass

 graft Dennis Ballard MD

 

                06/15/2021      Z95.5           Presence of coronary angioplasty

 implant and graft Dennis Ballard MD

 

                06/15/2021      R07.9           Chest pain, unspecified Dennis Ballard MD

 

                06/15/2021      I10             Essential (primary) hypertension

 Dennis Ballard MD

 

                06/15/2021      I35.0           Nonrheumatic aortic (valve) sten

osis Dennis Ballard MD

 

                06/15/2021      R94.31          Abnormal electrocardiogram [ECG]

 [EKG] Dennis Ballard MD

 

                06/15/2021      R55             Syncope and collapse Dennis soria MD

 

                06/15/2021      I45.2           Bifascicular block Dennis henderson MD

 

                06/15/2021      R00.2           Palpitations    Dennis Ballard MD

 

                06/15/2021      R42             Dizziness and giddiness Dennis Ballard MD

 

                06/15/2021      E78.2           Mixed hyperlipidemia Dennis soria MD

 

                06/15/2021      E66.3           Overweight      Dennis Ballard MD

 

                06/15/2021      Z71.3           Dietary counseling and surveilla

nce Dennis Ballard MD







Plan of Treatment

Future Appointment(s):* 2021 12:15 pm - Dennis Ballard MD at Main 
  Office

07/15/2021 - Dennis Ballard MD* I25.10 Atherosclerotic heart disease of 
  native coronary artery without angina pectoris* Recommendations:* Now that my 
  office has access to cardiac PET myocardial perfusion imaging which is a 
  superior diagnostic modality in comparison to stress SPECT myocardial perfu
  ayse imaging, I have canceled the stress SPECT myocardial perfusion imaging 
  study that I ordered previously and ordered cardiac PET myocardial perfusion 
  imaging instead (to define her coronary prognosis). Metoprolol tartrate was 
  decreased from 25 mg twice a day down to 12.5 mg twice a day with the hope of 
  providing a faster heart rate which in turn should improve the supply/demand 
  ratio as evidenced in the observed central BP waveform analysis today. 
  Continue nitroglycerin patch, valsartan, amlodipine, nitroglycerin sublingual,
   aspirin, clopidogrel, Ranexa.





* I10 Essential (primary) hypertension* Recommendations:* Metoprolol tartrate 
  was decreased to 12.5 mg twice a day. Continue valsartan and nitroglycerin 
  patch at the current dosages. Amlodipine 2.5 mg twice a day when necessary for
   systolic BP >160 as the patient is currently taking it was changed to 
  amlodipine 2.5 mg twice a day with instruction to hold dose for systolic BP <
  135 mmHg.





* All * New Medication:* Amlodipine Besylate 2.5 mg - 1 by mouth twice a day 
  (hold dose for systolic BP <135)



* Follow up:* Clinic visit in 8 weeks with Dr. Ballard.









Functional Status





                Functional Condition Comment         Date            Status

 

                Independent with all ADL's                                 Activ

e







Mental Status





                                        Description

 

                                        No Information Available







Referrals





                                        Description

 

                                        No Information Available

## 2021-10-26 NOTE — CCD
Summarization Of Episode

                             Created on: 10/26/2021



LORI TOWNSEND

External Reference #: [id number]

: 1945

Sex: Undifferentiated



Demographics





                          Address                   [address] [place] [address]  [address]

 

                          Home Phone                [phone]

 

                          Preferred Language        English

 

                          Marital Status            Unknown

 

                          Catholic Affiliation     Unknown

 

                          Race                      Unknown

 

                          Ethnic Group              Not  or 





Author





                          Author                    [facility]

 

                          Organization              [facility]

 

                          Address                   Unknown

 

                          Phone                     Unavailable







Support





                Name            Relationship    Address         Phone

 

                    LIZBETH TOWNSEND(SKIP) Next Of Kin         [address]  [address]                    [phone]

 

                    GLORIA TOWNSENDO       Next Of Kin         [address] [place] [address]  [address]                  [phone]

 

                RETIRED         Next Of Kin     Unknown         [phone]

 

                    NIYA COUNTRYMEN Next Of Kin         [address] [place]  [address]                [phone]

 

                    ROSCOE TOWNSEND        Next Of Kin         [address] [place] [address]  [address]                [phone]

 

                RE              Next Of Kin     Unknown         Unavailable

 

                    PROWFIDEL        Next Of Kin         [address] [facility] [address]

[address]

[place]  [address]                  [phone]

 

                DISABLED        Next Of Kin     Unknown         Unavailable

 

                    LENCHO,  NIYA Next Of Kin         [address] [place]  [address]                     [phone]

 

                    GLORIA TOWNSENDOE      Next Of Kin         [address]

[address] [place] [address]  [address]                  [phone]

 

                    GLORIA TOWNSENDOE      Next Of Kin         [address] [facility]

                                        [id number] [address]

[place]  [address]                  Unavailable

 

                TownsendGloria sanchezoe  [place]  [address] [phone]









Care Team Providers





                    Care Team Member Name Role                Phone

 

                    ROSA ISELA Fallon NP    Unavailable         Unavailable

 

                    Leeanne, L Halle NP    Unavailable         Unavailable

 

                    Leeanne, L Halle NP    Unavailable         Unavailable

 

                    Leeanne, L Halle NP    Unavailable         Unavailable

 

                    Leeanne, L Halle NP    Unavailable         Unavailable

 

                    Leeanne, L Halle NP    Unavailable         Unavailable

 

                    Leeanne, L Halle NP    Unavailable         Unavailable

 

                    Leeanne, L Halle NP    Unavailable         Unavailable

 

                    Leeanne, L Halle NP    Unavailable         Unavailable

 

                    Leeanne, L Halel NP    Unavailable         Unavailable

 

                    Leeanne, L Halle NP    Unavailable         Unavailable

 

                    Leeanne, L Halle NP    Unavailable         Unavailable

 

                    Leeanne, L Halle NP    Unavailable         Unavailable

 

                    Leeanne, L Halle NP    Unavailable         Unavailable

 

                    Leeanne, L Halle NP    Unavailable         Unavailable

 

                    Leeanne, L Halle NP    Unavailable         Unavailable

 

                    Leeanne, L Halle NP    Unavailable         Unavailable

 

                    Leeanne, L Halle NP    Unavailable         Unavailable

 

                    Leeanne, L Halle NP    Unavailable         Unavailable

 

                    Leeanne, L Halle NP    Unavailable         Unavailable

 

                    Leeanne, L Halle NP    Unavailable         Unavailable

 

                    Leeanne, L Halle NP    Unavailable         Unavailable

 

                    Leeanne, L Halle NP    Unavailable         Unavailable

 

                    Leeanne, L Halle NP    Unavailable         Unavailable

 

                    Leeanne, L Halle NP    Unavailable         Unavailable

 

                    Harrington, L Bina RNP Unavailable         Unavailable

 

                    Jomar, L Bina RNP Unavailable         Unavailable

 

                    Jomar, L Bina RNP Unavailable         Unavailable

 

                    Harrington, L Bina RNP Unavailable         Unavailable

 

                    Jomar, L Bina RNP Unavailable         Unavailable

 

                    Jomar, L Bina RNP Unavailable         Unavailable

 

                    Harrington, L Bina RNP Unavailable         Unavailable

 

                    Harrington, L Bina RNP Unavailable         Unavailable

 

                    Jomar, L Bina RNP Unavailable         Unavailable

 

                    Jomar, L Bina RNP Unavailable         Unavailable

 

                    Jomar, L Bina RNP Unavailable         Unavailable

 

                    Harrington, L Bina RNP Unavailable         Unavailable

 

                    Harrington, L Bina RNP Unavailable         Unavailable

 

                    Harrington, L Bina RNP Unavailable         Unavailable

 

                    Jomar, L Bina RNP Unavailable         Unavailable

 

                    Jomar, L Bina RNP Unavailable         Unavailable

 

                    Harrington, L Bina RNP Unavailable         Unavailable

 

                    Jomar, L Bina RNP Unavailable         Unavailable

 

                    Harrington, L Bina RNP Unavailable         Unavailable

 

                    Jomar, L Bina RNP Unavailable         Unavailable

 

                    Harrington, L Bina RNP Unavailable         Unavailable

 

                    Harrington, L Bina RNP Unavailable         Unavailable

 

                    Harrington, L Bina RNP Unavailable         Unavailable

 

                    Jomar, L Bina RNP Unavailable         Unavailable

 

                    Jomar, L Bina RNP Unavailable         Unavailable

 

                    Harrington, L Bina RNP Unavailable         Unavailable

 

                    Harrington, L Bina RNP Unavailable         Unavailable

 

                    Jomar, L Bina RNP Unavailable         Unavailable

 

                    Jomar, L Bina RNP Unavailable         Unavailable

 

                    Jomar, L Bina RNP Unavailable         Unavailable

 

                    Harrington, L Bina RNP Unavailable         Unavailable

 

                    Harrington, L Bina RNP Unavailable         Unavailable

 

                    Harrington, L Bina RNP Unavailable         Unavailable

 

                    Harrington, L Bina RNP Unavailable         Unavailable

 

                    Harrington, L Bina RNP Unavailable         Unavailable

 

                    Harrington, L Bina RNP Unavailable         Unavailable

 

                    Harrington, L Bina RNP Unavailable         Unavailable

 

                    Jomar, L Bina RNP Unavailable         Unavailable

 

                    Harrington, L Bina RNP Unavailable         Unavailable

 

                    BLOCK SR, MESHA VELASQUEZ MD Unavailable         Unavailable

 

                    BLOCK SR, MESHA VELASQUEZ MD Unavailable         Unavailable

 

                    BLOCK SR, MESHA VELASQUEZ MD Unavailable         Unavailable

 

                    BLOCK SR, MESHA VELASQUEZ MD Unavailable         Unavailable

 

                    BLOCK SR, MESHA VELASQUEZ MD Unavailable         Unavailable

 

                    BLOCK SR, MESHA VELASQUEZ MD Unavailable         Unavailable

 

                    BLOCK SR, MESHA VELASQUEZ MD Unavailable         Unavailable

 

                    BLOCK SR, MESHA VELASQUEZ MD Unavailable         Unavailable

 

                    BLOCK SR, MESHA VELASQUEZ MD Unavailable         Unavailable

 

                    BLOCK SR, MESHA VELASQUEZ MD Unavailable         Unavailable

 

                    BLOCK SR, MESHA VELASQUEZ MD Unavailable         Unavailable

 

                    BLOCK SR, MESHA VELASQUEZ MD Unavailable         Unavailable

 

                    BLOCK SR, MESHA VELASQUEZ MD Unavailable         Unavailable

 

                    BLOCK SR, MESHA VELASQUEZ MD Unavailable         Unavailable

 

                    BLOCK SR, MESHA VELASQUEZ MD Unavailable         Unavailable

 

                    BLOCK SR, MESHA VELASQUEZ MD Unavailable         Unavailable

 

                    BLOCK SR, MESHA VELASQUEZ MD Unavailable         Unavailable

 

                    BLOCK SR, MESHA VELASQUEZ MD Unavailable         Unavailable

 

                    BLOCK SR, MESHA VELASQUEZ MD Unavailable         Unavailable

 

                    BLOCK SR, MESHA VELASQUEZ MD Unavailable         Unavailable

 

                    BLOCK SR, MESHA VELASQUEZ MD Unavailable         Unavailable

 

                    BLOCK SR, MESHA VELASQUEZ MD Unavailable         Unavailable

 

                    BLOCK SR, MESHA VELASQUEZ MD Unavailable         Unavailable

 

                    BLOCK SR, MESHA VELASQUEZ MD Unavailable         Unavailable

 

                    BLOCK SR, MESHA VELASQUEZ MD Unavailable         Unavailable

 

                    BLOCK SR, MESHA VELASQUEZ MD Unavailable         Unavailable

 

                    BLOCK SR, MESHA VELASQUEZ MD Unavailable         Unavailable

 

                    BLOCK SR, MESHA VELASQUEZ MD Unavailable         Unavailable

 

                    BLOCK SR, MESHA VELASQUEZ MD Unavailable         Unavailable

 

                    BLOCK SR, MESHA VELASQUEZ MD Unavailable         Unavailable

 

                    BLOCK SR, MESHA VELASQUEZ MD Unavailable         Unavailable

 

                    BLOCK SR, MESHA VELASQUEZ MD Unavailable         Unavailable

 

                    BLOCK SR, MESHA VELASQUEZ MD Unavailable         Unavailable

 

                    BLOCK SR, MESHA VELASQUEZ MD Unavailable         Unavailable

 

                    BLOCK SR, MESHA VELASQUEZ MD Unavailable         Unavailable

 

                    BLOCK SR, MESHA VELASQUEZ MD Unavailable         Unavailable

 

                    BLOCK SR, MESHA VELASQUEZ MD Unavailable         Unavailable

 

                    BLOCK SR, MESHA VELASQUEZ MD Unavailable         Unavailable

 

                    BLOCK SR, MESHA VELASQUEZ MD Unavailable         Unavailable

 

                    BLOCK SR, MESHA VELASQUEZ MD Unavailable         Unavailable

 

                    BLOCK SR, MESHA VELASQUEZ MD Unavailable         Unavailable

 

                    BLOCK SR, MESHA VELASQUEZ MD Unavailable         Unavailable

 

                    BLOCK SR, MESHA VELASQUEZ MD Unavailable         Unavailable

 

                    BLOCK SR, MESHA VELASQUEZ MD Unavailable         Unavailable

 

                    BLOCK SR, MESHA VELASQUEZ MD Unavailable         Unavailable

 

                    BLOCK SR, MESHA VELASQUEZ MD Unavailable         Unavailable

 

                    BLOCK SR, MESHA VELASQUEZ MD Unavailable         Unavailable

 

                    BLOCK SR, MESHA VELASQUEZ MD Unavailable         Unavailable

 

                    BLOCK SR, MESHA VELASQUEZ MD Unavailable         Unavailable

 

                    BLOCK SR, MESHA VELASQUEZ MD Unavailable         Unavailable

 

                    BLOCK SR, MESHA VELASQUEZ MD Unavailable         Unavailable

 

                    BLOCK SR, MESHA VELASQUEZ MD Unavailable         Unavailable

 

                    BLOCK SR, MESHA VELASQUEZ MD Unavailable         Unavailable

 

                    BLOCK SR, MESHA VELASQUEZ MD Unavailable         Unavailable

 

                    BLOCK SR, MESHA VELASQUEZ MD Unavailable         Unavailable

 

                    BLOCK SR, MESHA VELASQUEZ MD Unavailable         Unavailable

 

                    Jules  Joselito DO    Unavailable         Unavailable

 

                    Juan, M Umair PA-C Unavailable         Unavailable

 

                    Juan, M Umair PA-C Unavailable         Unavailable

 

                    Juan, M Umair PA-C Unavailable         Unavailable

 

                    Juan, M Umair PA-C Unavailable         Unavailable

 

                    Juan, M Umair PA-C Unavailable         Unavailable

 

                    Juan, M Umair PA-C Unavailable         Unavailable

 

                    Juan, M Umair PA-C Unavailable         Unavailable

 

                    Juan, M Umair PA-C Unavailable         Unavailable

 

                    Juan, M Umair PA-C Unavailable         Unavailable

 

                    Juan, M Umair PA-C Unavailable         Unavailable

 

                    Juan, M Umair PA-C Unavailable         Unavailable

 

                    Juan, M Umair PA-C Unavailable         Unavailable

 

                    Juan, M Umair PA-C Unavailable         Unavailable

 

                    Juan, M Umair PA-C Unavailable         Unavailable

 

                    Juan, M Umair PA-C Unavailable         Unavailable

 

                    Juan, M Umair PA-C Unavailable         Unavailable

 

                    Juan, M Umair PA-C Unavailable         Unavailable

 

                    Juan, M Umair PA-C Unavailable         Unavailable

 

                    Juan, M Umair PA-C Unavailable         Unavailable

 

                    Juan, M Umair PA-C Unavailable         Unavailable

 

                    Juan, M Umair PA-C Unavailable         Unavailable

 

                    Juan, M Umair PA-C Unavailable         Unavailable

 

                    Juan, M Umair PA-C Unavailable         Unavailable

 

                    Juan, M Umair PA-C Unavailable         Unavailable

 

                    Juan, M Umair PA-C Unavailable         Unavailable

 

                    Juan, M Umair PA-C Unavailable         Unavailable

 

                    Juan, M Umair PA-C Unavailable         Unavailable

 

                    Juan, M Umair PA-C Unavailable         Unavailable

 

                    Juan, M Umair PA-C Unavailable         Unavailable

 

                    Juan, M Umair PA-C Unavailable         Unavailable

 

                    Juan, M Umair PA-C Unavailable         Unavailable

 

                    Juan, M Umair PA-C Unavailable         Unavailable

 

                    Juan, M Umair PA-C Unavailable         Unavailable

 

                    Juan, M Umair PA-C Unavailable         Unavailable

 

                    Juan, M Umair PA-C Unavailable         Unavailable

 

                    Juan, M Umair PA-C Unavailable         Unavailable

 

                    Juan, M Umair PA-C Unavailable         Unavailable

 

                    HELGA, AURORA CL PA Unavailable         Unavailable

 

                    HELGA, AURORA CL PA Unavailable         Unavailable

 

                    HELGA, AURORA CL PA Unavailable         Unavailable

 

                    HELGA, AURORA CL PA Unavailable         Unavailable

 

                    HELGA, AURORA CL PA Unavailable         Unavailable

 

                    HELGA, AURORA CL PA Unavailable         Unavailable

 

                    HELGA, AURORA CL PA Unavailable         Unavailable

 

                    HELGA, AURORA CL PA Unavailable         Unavailable

 

                    HELGA, AURORA CL PA Unavailable         Unavailable

 

                    HELGA, AURORA CL PA Unavailable         Unavailable

 

                    HELGA, AURORA CL PA Unavailable         Unavailable

 

                    HELGA, AURORA CL PA Unavailable         Unavailable

 

                    HELGA, AURORA CL PA Unavailable         Unavailable

 

                    HELGA, AURORA CL PA Unavailable         Unavailable

 

                    HELGA, AURORA CL PA Unavailable         Unavailable

 

                    HELGA, AURORA CL PA Unavailable         Unavailable

 

                    HELGA, AURORA CL PA Unavailable         Unavailable

 

                    HELGA, AURORA CL PA Unavailable         Unavailable

 

                    HELGA, AURORA CL PA Unavailable         Unavailable

 

                    HELGA, AURORA CL PA Unavailable         Unavailable

 

                    HELGA, AURORA CL PA Unavailable         Unavailable

 

                    HELGA, AURORA CL PA Unavailable         Unavailable

 

                    Mc, Reginah W Michelle FNP-C Unavailable         Unavailabl

e

 

                    Mc, Reginakian W Michelle FNP-C Unavailable         Unavailabl

e

 

                    Mc, Reginakian W Michelle FNP-C Unavailable         Unavailabl

e

 

                    Mc, Reginakian W Michelle FNP-C Unavailable         Unavailabl

e

 

                    Mc, Reginakian W Michelle FNP-C Unavailable         Unavailabl

e

 

                    Mc, Reginakian W Michelle FNP-C Unavailable         Unavailabl

e

 

                    Mc, Reginakian W Michelle FNP-C Unavailable         Unavailabl

e

 

                    Mc, Reginakian W Michelle FNP-C Unavailable         Unavailabl

e

 

                    Mc, Reginakian W Michelle FNP-C Unavailable         Unavailabl

e

 

                    Mc, Reginakian W Michelle FNP-C Unavailable         Unavailabl

e

 

                    Mc, Reginakian W Michelle FNP-C Unavailable         Unavailabl

e

 

                    Mc, Reginakian W Michelle FNP-C Unavailable         Unavailabl

e

 

                    Mc, Reginakian W Michelle FNP-C Unavailable         Unavailabl

e

 

                    Mc, Reginakian W Michelle FNP-C Unavailable         Unavailabl

e

 

                    Mc, Reginakian W Michelle FNP-C Unavailable         Unavailabl

e

 

                    Mc, Reginakian W Michelle FNP-C Unavailable         Unavailabl

e

 

                    Mc, Reginakian W Michelle FNP-C Unavailable         Unavailabl

e

 

                    Mc, Reginakian W Michelle FNP-C Unavailable         Unavailabl

e

 

                    Mc, Reginah W Michelle FNP-C Unavailable         Unavailabl

e

 

                    Mc, Reginah W Michelle FNP-C Unavailable         Unavailabl

e

 

                    Mc, Reginah W Michelle FNP-C Unavailable         Unavailabl

e

 

                    Mc, Reginah W Michelle FNP-C Unavailable         Unavailabl

e

 

                    Mc, Reginah W Michelle FNP-C Unavailable         Unavailabl

e

 

                    Mc, Reginah W Michelle FNP-C Unavailable         Unavailabl

e

 

                    Mc, Reginah W Michelle FNP-C Unavailable         Unavailabl

e

 

                    Mc, Reginah W Michelle FNP-C Unavailable         Unavailabl

e

 

                    Mc, Reginah W Michelle FNP-C Unavailable         Unavailabl

e

 

                    Mc, Reginah W Michelle FNP-C Unavailable         Unavailabl

e

 

                    Mc, Reginah W Michelle FNP-C Unavailable         Unavailabl

e

 

                    Mc, Reginah W Michelle FNP-C Unavailable         Unavailabl

e

 

                    Mc, Reginah W Michelle FNP-C Unavailable         Unavailabl

e

 

                    Mc, Reginah W Michelle FNP-C Unavailable         Unavailabl

e

 

                    MENESS, A CORNELIO DO  Unavailable         Unavailable

 

                    MENESS, A CORNELIO DO  Unavailable         Unavailable

 

                    MENESS, A CORNELIO DO  Unavailable         Unavailable

 

                    MENESS, A CORNELIO DO  Unavailable         Unavailable

 

                    MENESS, A CORNELIO DO  Unavailable         Unavailable

 

                    MENESS, A CORNELIO DO  Unavailable         Unavailable

 

                    MENESS, A CORNELIO DO  Unavailable         Unavailable

 

                    MENESS, A CORNELIO DO  Unavailable         Unavailable

 

                    MENESS, A CORNELIO DO  Unavailable         Unavailable

 

                    MENESS, A CORNELIO DO  Unavailable         Unavailable

 

                    MENESS, A CORNELIO DO  Unavailable         Unavailable

 

                    MENESS, A CORNELIO DO  Unavailable         Unavailable

 

                    MENESS, A CORNELIO DO  Unavailable         Unavailable

 

                    MENESS, A CORNELIO DO  Unavailable         Unavailable

 

                    MENESS, A CORNELIO DO  Unavailable         Unavailable

 

                    MENESS, A CORNELIO DO  Unavailable         Unavailable

 

                    MENESS, A CORNELIO DO  Unavailable         Unavailable

 

                    MENESS, A CORNELIO DO  Unavailable         Unavailable

 

                    MENESS, A CORNELIO DO  Unavailable         Unavailable

 

                    MENESS, A CORNELIO DO  Unavailable         Unavailable

 

                    MENESS, A CORNELIO DO  Unavailable         Unavailable

 

                    MENESS, A CORNELIO DO  Unavailable         Unavailable

 

                    MENESS, A CORNELIO DO  Unavailable         Unavailable

 

                    MENESS, A CORNELIO DO  Unavailable         Unavailable

 

                    MENESS, A CORNELIO DO  Unavailable         Unavailable

 

                    MENESS, A CORNELIO DO  Unavailable         Unavailable

 

                    MENESS, A CORNELIO DO  Unavailable         Unavailable

 

                    MENESS, A CORNELIO DO  Unavailable         Unavailable

 

                    MENESS, A CORNELIO DO  Unavailable         Unavailable

 

                    TIM Gifford MD  Unavailable         Unavailable

 

                    TIM Gifford MD  Unavailable         Unavailable

 

                    GiffordTIM MD  Unavailable         Unavailable

 

                    GiffordTIM MD  Unavailable         Unavailable

 

                    GiffordTIM MD  Unavailable         Unavailable

 

                    TIM Gifford MD  Unavailable         Unavailable

 

                    GiffordTIM MD  Unavailable         Unavailable

 

                    GiffordTIM MD  Unavailable         Unavailable

 

                    GiffordTIM MD  Unavailable         Unavailable

 

                    GiffordTIM MD  Unavailable         Unavailable

 

                    GiffordTIM MD  Unavailable         Unavailable

 

                    GiffordTIM MD  Unavailable         Unavailable

 

                    GiffordTIM MD  Unavailable         Unavailable

 

                    GiffordTIM MD  Unavailable         Unavailable

 

                    GiffordTIM MD  Unavailable         Unavailable

 

                    GiffordTIM MD  Unavailable         Unavailable

 

                    GiffordTIM MD  Unavailable         Unavailable

 

                    GiffordTIM MD  Unavailable         Unavailable

 

                    GiffordTIM MD  Unavailable         Unavailable

 

                    GiffordTIM MD  Unavailable         Unavailable

 

                    GiffordTIM MD  Unavailable         Unavailable

 

                    GiffordTIM MD  Unavailable         Unavailable

 

                    GiffordTIM MD  Unavailable         Unavailable

 

                    GiffordTIM MD  Unavailable         Unavailable

 

                    GiffordTIM MD  Unavailable         Unavailable

 

                    GiffordTIM MD  Unavailable         Unavailable

 

                    GiffordTIM MD  Unavailable         Unavailable

 

                    GiffordTIM MD  Unavailable         Unavailable

 

                    TIM Gifford MD  Unavailable         Unavailable

 

                    TIM Gifford MD  Unavailable         Unavailable

 

                    TIM Gifford MD  Unavailable         Unavailable

 

                    TIM Gifford MD  Unavailable         Unavailable

 

                    TIM Gifford MD  Unavailable         Unavailable

 

                    TIM Gifford MD  Unavailable         Unavailable

 

                    TIM Gifford MD  Unavailable         Unavailable

 

                    TIM Gifford MD  Unavailable         Unavailable

 

                    TIM Gifford MD  Unavailable         Unavailable

 

                    TIM Gifford MD  Unavailable         Unavailable

 

                    TIM Gifford MD  Unavailable         Unavailable

 

                    TIM Gifford MD  Unavailable         Unavailable

 

                    TIM Gifford MD  Unavailable         Unavailable

 

                    TIM Gifford MD  Unavailable         Unavailable

 

                    TIM Gifford MD  Unavailable         Unavailable

 

                    TIM Gifford MD  Unavailable         Unavailable

 

                    TIM Gifford MD  Unavailable         Unavailable

 

                    TIM Gifford MD  Unavailable         Unavailable

 

                    TIM Gifford MD  Unavailable         Unavailable

 

                    TIM Gifford MD  Unavailable         Unavailable

 

                    TIM Gifford MD  Unavailable         Unavailable

 

                    TIM Gifford MD  Unavailable         Unavailable

 

                    TIM Gifford MD  Unavailable         Unavailable

 

                    TIM Gifford MD  Unavailable         Unavailable

 

                    TIM Gifford MD  Unavailable         Unavailable

 

                    TIM Gifford MD  Unavailable         Unavailable

 

                    TIM Gifford MD  Unavailable         Unavailable

 

                    TIM Gifford MD  Unavailable         Unavailable

 

                    TIM Gifford MD  Unavailable         Unavailable

 

                    TIM Gifford MD  Unavailable         Unavailable

 

                    ANTECOLKIAN MD Unavailable         Unavailable

 

                    ANTECOL, KIAN VELASQUEZ MD Unavailable         Unavailable

 

                    ANTECOL, KIAN VELASQUEZ MD Unavailable         Unavailable

 

                    ANTECOL, KIAN VELASQUEZ MD Unavailable         Unavailable

 

                    ANTECOL, KIAN VELASQUEZ MD Unavailable         Unavailable

 

                    ANTECOL, KIAN VELASQUEZ MD Unavailable         Unavailable

 

                    ANTECOL, KIAN VELASQUEZ MD Unavailable         Unavailable

 

                    ANTECOL, KIAN VELASQUEZ MD Unavailable         Unavailable

 

                    ANTECOL, KIAN VELASQUEZ MD Unavailable         Unavailable

 

                    ANTECOL, KIAN VELASQUEZ MD Unavailable         Unavailable

 

                    ANTECOL, KIAN VELASQUEZ MD Unavailable         Unavailable

 

                    ANTECOL, KIAN VELASQUEZ MD Unavailable         Unavailable

 

                    ANTECOL, KIAN VELASQUEZ MD Unavailable         Unavailable

 

                    ANTECOL, KIAN VELASQUEZ MD Unavailable         Unavailable

 

                    ANTECOL, KIAN VELASQUEZ MD Unavailable         Unavailable

 

                    ANTECOL, KIAN VELASQUEZ MD Unavailable         Unavailable

 

                    ANTECOL, KIAN VELASQUEZ MD Unavailable         Unavailable

 

                    ANTECOL, KIAN VELASQUEZ MD Unavailable         Unavailable

 

                    ANTECOL, KIAN VELASQUEZ MD Unavailable         Unavailable

 

                    ANTECOL, KIAN VELASQUEZ MD Unavailable         Unavailable

 

                    ANTECOL, KIAN VELASQUEZ MD Unavailable         Unavailable

 

                    ANTECOL, KIAN VELASQUEZ MD Unavailable         Unavailable

 

                    ANTECOL, KIAN VELASQUEZ MD Unavailable         Unavailable

 

                    ANTECOL, KIAN VELASQUEZ MD Unavailable         Unavailable

 

                    ANTECOL, KIAN VELASQUEZ MD Unavailable         Unavailable

 

                    ANTECOL, KIAN VELASQUEZ MD Unavailable         Unavailable

 

                    ANTECOL, KIAN VELASQUEZ MD Unavailable         Unavailable

 

                    ANTECOL, KIAN VELASQUEZ MD Unavailable         Unavailable

 

                    ANTECOL, KIAN VELASQUEZ MD Unavailable         Unavailable

 

                    ANTECOL, KIAN VELASQUEZ MD Unavailable         Unavailable

 

                    ANTECOL, KIAN VELASQUEZ MD Unavailable         Unavailable

 

                    ANTECOL, KIAN VELASQUEZ MD Unavailable         Unavailable

 

                    ANTECOL, KIAN VELASQUEZ MD Unavailable         Unavailable

 

                    ANTECOL, KIAN VELASQUEZ MD Unavailable         Unavailable

 

                    ANTECOL, KIAN VELASQUEZ MD Unavailable         Unavailable

 

                    ANTECOL, KIAN VELASQUEZ MD Unavailable         Unavailable

 

                    ANTECOL, KIAN VELASQUEZ MD Unavailable         Unavailable

 

                    ANTECOL, KIAN VELASQUEZ MD Unavailable         Unavailable

 

                    ANTECOL, KIAN VELASQUEZ MD Unavailable         Unavailable

 

                    ANTECOL, KIAN VELASQUEZ MD Unavailable         Unavailable

 

                    ANTECOL, KIAN VELASQUEZ MD Unavailable         Unavailable

 

                    ANTECOL, KIAN VELASQUEZ MD Unavailable         Unavailable

 

                    ANTECOL, KIAN VELASQUEZ MD Unavailable         Unavailable

 

                    ANTECOL, KIAN VELASQUEZ MD Unavailable         Unavailable

 

                    ANTECOL, KIAN VELASQUEZ MD Unavailable         Unavailable

 

                    ANTECOL, KIAN VELASQUEZ MD Unavailable         Unavailable

 

                    ANTECOL, KIAN VELASQUEZ MD Unavailable         Unavailable

 

                    ANTECOL, KIAN VELASQUEZ MD Unavailable         Unavailable

 

                    ANTECOL, KIAN VELASQUEZ MD Unavailable         Unavailable

 

                    ANTECOL, KIAN VELASQUEZ MD Unavailable         Unavailable

 

                    ANTECOL, KIAN VELASQUEZ MD Unavailable         Unavailable

 

                    ANTECOL, KIAN VELASQUEZ MD Unavailable         Unavailable

 

                    ANTECOL, IKAN VELASQUEZ MD Unavailable         Unavailable

 

                    ANTECOL, KIAN VELASQUEZ MD Unavailable         Unavailable

 

                    DHAVAL, L FREIDA PA Unavailable         Unavailable

 

                    DHAVAL, L FREIDA PA Unavailable         Unavailable

 

                    DHAVAL, L FREIDA PA Unavailable         Unavailable

 

                    DHAVAL, L FREIDA PA Unavailable         Unavailable

 

                    DHAVAL, L FREIDA PA Unavailable         Unavailable

 

                    DHAVAL, L FREIDA PA Unavailable         Unavailable

 

                    DHAVAL, L FREIDA PA Unavailable         Unavailable

 

                    DHAVAL, L FREIDA PA Unavailable         Unavailable

 

                    DHAVAL, L FREIDA PA Unavailable         Unavailable

 

                    DHAVAL, L FREIDA PA Unavailable         Unavailable

 

                    DHAVAL, L FREIDA PA Unavailable         Unavailable

 

                    DHAVAL, L FREIDA PA Unavailable         Unavailable

 

                    DHAVAL, L FREIDA PA Unavailable         Unavailable

 

                    DHAVAL, L FREIDA PA Unavailable         Unavailable

 

                    DHAVAL, L FREIDA PA Unavailable         Unavailable

 

                    DHAVAL, L FREIDA PA Unavailable         Unavailable

 

                    DHAVAL, L FREIDA PA Unavailable         Unavailable

 

                    DHAVAL, L FREIDA PA Unavailable         Unavailable

 

                    DHVAAL, L FREIDA PA Unavailable         Unavailable

 

                    DHAVAL, L FREIDA PA Unavailable         Unavailable

 

                    DHAVAL, L FREIDA PA Unavailable         Unavailable

 

                    DHAVAL, L FREIDA PA Unavailable         Unavailable

 

                    BILLYNATALIE MD Unavailable         Unavailable

 

                    BILLYNATALIE MD Unavailable         Unavailable

 

                    BILLY, NATALIE CROFT MD Unavailable         Unavailable

 

                    BILLYNATALIE MD Unavailable         Unavailable

 

                    BILLYNATALIE MD Unavailable         Unavailable

 

                    BILLYNATALIE MD Unavailable         Unavailable

 

                    BILLY, NATALIE CROFT MD Unavailable         Unavailable

 

                    BILLYNATALIE MD Unavailable         Unavailable

 

                    BILLYNATALIE MD Unavailable         Unavailable

 

                    BILLYNATALIE MD Unavailable         Unavailable

 

                    BILLYNATALIE MD Unavailable         Unavailable

 

                    BILLYNATALIE MD Unavailable         Unavailable

 

                    BILLYNATALIE MD Unavailable         Unavailable

 

                    BILLYNATALIE MD Unavailable         Unavailable

 

                    BILLYNATALIE MD Unavailable         Unavailable

 

                    BILLYNATALIE MD Unavailable         Unavailable

 

                    BILLYNATALIE MD Unavailable         Unavailable

 

                    BILLYNATALIE MD Unavailable         Unavailable

 

                    BILLYNATALIE MD Unavailable         Unavailable

 

                    BILLYNATALIE MD Unavailable         Unavailable

 

                    BILLYNATALIE MD Unavailable         Unavailable

 

                    BILLYNATALIE MD Unavailable         Unavailable

 

                    BILLYNATALIE MD Unavailable         Unavailable

 

                    BILLYNATALIE MD Unavailable         Unavailable

 

                    BILLYNATALIE MD Unavailable         Unavailable

 

                    BILLYNATALIE MD Unavailable         Unavailable

 

                    BILLYNATALEI MD Unavailable         Unavailable

 

                    BILLYNATALIE MD Unavailable         Unavailable

 

                    BILLYNATALIE MD Unavailable         Unavailable

 

                    BILLYNATALIE MD Unavailable         Unavailable

 

                    BILLYNATALIE MD Unavailable         Unavailable

 

                    BILLYNATALIE MD Unavailable         Unavailable

 

                    BILLYNATALIE MD Unavailable         Unavailable

 

                    BILLYNATALIE MD Unavailable         Unavailable

 

                    BILLYNATALIE MD Unavailable         Unavailable

 

                    BILLYNATALIE MD Unavailable         Unavailable

 

                    BILLYNATALIE MD Unavailable         Unavailable

 

                    BILLYNATALIE MD Unavailable         Unavailable

 

                    BILLYNATALIE MD Unavailable         Unavailable

 

                    BILLYNATALIE MD Unavailable         Unavailable

 

                    BILLYNATALIE MD Unavailable         Unavailable

 

                    BILLY, P GUERDA MD Unavailable         Unavailable

 

                    BILLY, P GUERDA BAILEY Unavailable         Unavailable

 

                    BILLY, NATALIE CROFT MD Unavailable         Unavailable

 

                    BILLY, P GUERDA MD Unavailable         Unavailable

 

                    BILLY, P GUERDA MD Unavailable         Unavailable

 

                    BILLY, P GUERDA MD Unavailable         Unavailable

 

                    BILLY, P GUERDA MD Unavailable         Unavailable

 

                    BILLY, P GUERDA MD Unavailable         Unavailable

 

                    BILLY, NATALIE CROFT MD Unavailable         Unavailable

 

                    BILLY, P GUERDA MD Unavailable         Unavailable

 

                    BILLY, P GUERDA MD Unavailable         Unavailable

 

                    BILLY, P GUERDA MD Unavailable         Unavailable

 

                    BILLY, P GUERDA MD Unavailable         Unavailable

 

                    BILLY, P GUERDA MD Unavailable         Unavailable

 

                    BILLY, NATALIE CROFT MD Unavailable         Unavailable

 

                    BILLY, P GUERDA MD Unavailable         Unavailable

 

                    BILLY, NATALIE CROFT MD Unavailable         Unavailable

 

                    BILLY, NATALIE CROFT MD Unavailable         Unavailable

 

                    BILLY, NATALIE CROFT MD Unavailable         Unavailable

 

                    BILLY, NATALIE CROFT MD Unavailable         Unavailable

 

                    BILLY, NATALIE CROFT MD Unavailable         Unavailable

 

                    BILLY, NATALIE CROFT MD Unavailable         Unavailable

 

                    BILLY, NATALIE CROFT MD Unavailable         Unavailable

 

                    BILLY, NATALIE CROFT MD Unavailable         Unavailable

 

                    BILLY, NATALIE CROFT MD Unavailable         Unavailable

 

                    BILLY, NATALIE CROFT MD Unavailable         Unavailable

 

                    BILLY, NATALIE CROFT MD Unavailable         Unavailable

 

                    BILLY, NATALIE CROFT MD Unavailable         Unavailable

 

                    BILLY, NATALIE CROFT MD Unavailable         Unavailable

 

                    BILLY, NATALIE CROFT MD Unavailable         Unavailable

 

                    BILLY, NATALIE CROFT MD Unavailable         Unavailable

 

                    BILLY, NATALIE CROFT MD Unavailable         Unavailable

 

                    BILLY, NATALIE CROFT MD Unavailable         Unavailable

 

                    BILLY, NATALIE CROFT MD Unavailable         Unavailable

 

                    BILLY, NATALIE CROFT MD Unavailable         Unavailable

 

                    BILLY, NATALIE CROFT MD Unavailable         Unavailable

 

                    BILLY, NATALIE CROFT MD Unavailable         Unavailable

 

                    BILLY, NATALIE CROFT MD Unavailable         Unavailable

 

                    BILLY, NATALIE CROFT MD Unavailable         Unavailable

 

                    BILLY, NATALIE CROFT MD Unavailable         Unavailable

 

                    BILLY, NATALIE CROFT MD Unavailable         Unavailable

 

                    BILLY, NATALIE CROFT MD Unavailable         Unavailable

 

                    BILLY, NATALIE CROFT MD Unavailable         Unavailable

 

                    BILLY, NATALIE CROFT MD Unavailable         Unavailable

 

                    BILLY, NATALIE CROFT MD Unavailable         Unavailable

 

                    BILLY, NATALIE CROFT MD Unavailable         Unavailable

 

                    BILLY, NATALIE CROFT MD Unavailable         Unavailable

 

                    BILLY, NATALIE CROFT MD Unavailable         Unavailable

 

                    BILLY, NATALIE CROFT MD Unavailable         Unavailable

 

                    BILLY, NATALIE CROFT MD Unavailable         Unavailable

 

                    BILLY, NATALIE CROFT MD Unavailable         Unavailable

 

                    BILLY, NATALIE CROFT MD Unavailable         Unavailable

 

                    BILLY, NATALIE CROFT MD Unavailable         Unavailable

 

                    Coleman, A Umair FNP Unavailable         Unavailable

 

                    Coleman, A Umair FNP Unavailable         Unavailable

 

                    Coleman, A Umair FNP Unavailable         Unavailable

 

                    Coleman, A Umair FNP Unavailable         Unavailable

 

                    Coleman, A Umair FNP Unavailable         Unavailable

 

                    Coleman, A Umair FNP Unavailable         Unavailable

 

                    Coleman, A Umair FNP Unavailable         Unavailable

 

                    Coleman, A Umair FNP Unavailable         Unavailable

 

                    Coleman, A Umair FNP Unavailable         Unavailable

 

                    Coleman, A Umair FNP Unavailable         Unavailable

 

                    Coleman, A Umair FNP Unavailable         Unavailable

 

                    Coleman, A Umair FNP Unavailable         Unavailable

 

                    Coleman, A Umair FNP Unavailable         Unavailable

 

                    Coleman, A Umair FNP Unavailable         Unavailable

 

                    Coleman, A Umair FNP Unavailable         Unavailable

 

                    Coleman, A Umair FNP Unavailable         Unavailable

 

                    Coleman, A Umair FNP Unavailable         Unavailable

 

                    Coleman, A Umair FNP Unavailable         Unavailable

 

                    Coleman, A Umair FNP Unavailable         Unavailable

 

                    Coleman, A Umair FNP Unavailable         Unavailable

 

                    Coleman, A Umair FNP Unavailable         Unavailable

 

                    Coleman, A Umair FNP Unavailable         Unavailable

 

                    Coleman, A Umair FNP Unavailable         Unavailable

 

                    Coleman, A Umair FNP Unavailable         Unavailable

 

                    Coleman, A Umair FNP Unavailable         Unavailable

 

                    Coleman, A Umair FNP Unavailable         Unavailable

 

                    Coleman, A Umair FNP Unavailable         Unavailable

 

                    Coleman, A Umair FNP Unavailable         Unavailable

 

                    Coleman, A Umair FNP Unavailable         Unavailable

 

                    Coleman, A Umair FNP Unavailable         Unavailable

 

                    Coleman, A Muair FNP Unavailable         Unavailable

 

                    Coleman, A Umair FNP Unavailable         Unavailable

 

                    Coleman, A Umair FNP Unavailable         Unavailable

 

                    Coleman, A Umair FNP Unavailable         Unavailable

 

                    Coleman, A Umair FNP Unavailable         Unavailable

 

                    Coleman, A Umair FNP Unavailable         Unavailable

 

                    Coleman, A Umair FNP Unavailable         Unavailable

 

                    Coleman, A Umair FNP Unavailable         Unavailable

 

                    Coleman, A Umair FNP Unavailable         Unavailable

 

                    Coleman, A Umair FNP Unavailable         Unavailable

 

                    Coleman, A Umair FNP Unavailable         Unavailable

 

                    Coleman, A Umair FNP Unavailable         Unavailable

 

                    Coleman, A Umair FNP Unavailable         Unavailable

 

                    Coleman, A Umair FNP Unavailable         Unavailable

 

                    Coleman, A Umair FNP Unavailable         Unavailable

 

                    Coleman, A Umair FNP Unavailable         Unavailable

 

                    Coleman, A Umair FNP Unavailable         Unavailable

 

                    Coleman, A Umair FNP Unavailable         Unavailable

 

                    Coleman, A Umair FNP Unavailable         Unavailable

 

                    Coleman, A Umair FNP Unavailable         Unavailable

 

                    Coleman, A Umair FNP Unavailable         Unavailable

 

                    Coleman, A Umair FNP Unavailable         Unavailable



                                  



Re-disclosure Warning

          The records that you are about to access may contain information from 
federally-assisted alcohol or drug abuse programs. If such information is 
present, then the following federally mandated warning applies: This information
has been disclosed to you from records protected by federal confidentiality 
rules (42 CFR part 2). The federal rules prohibit you from making any further 
disclosure of this information unless further disclosure is expressly permitted 
by the written consent of the person to whom it pertains or as otherwise 
permitted by 42 CFR part 2. A general authorization for the release of medical 
or other information is NOT sufficient for this purpose. The Federal rules 
restrict any use of the information to criminally investigate or prosecute any 
alcohol or drug abuse patient.The records that you are about to access may 
contain highly sensitive health information, the redisclosure of which is 
protected by Article 27-F of the [facility] Public Health law. If you 
continue you may have access to information: Regarding HIV / AIDS; Provided by 
facilities licensed or operated by the [facility] Office of Mental Health; 
or Provided by the [facility] Office for People With Developmental 
Disabilities. If such information is present, then the following [place] 
mandated warning applies: This information has been disclosed to you from 
confidential records which are protected by state law. State law prohibits you 
from making any further disclosure of this information without the specific 
written consent of the person to whom it pertains, or as otherwise permitted by 
law. Any unauthorized further disclosure in violation of state law may result in
a fine or [facility] sentence or both. A general authorization for the release of 
medical or other information is NOT sufficient authorization for further disc
losure.                                                                         
    



Allergies and Adverse Reactions

          



           Type       Description Substance  Reaction   Status     Data Source(s

)

 

                      Adhesive Tape Adhesive Tape RASH, RAW, VERY SORE [facility]

 

           Drug allergy latex      Latex                            [facility]

 

           Drug allergy atorvastatin atorvastatin LEG CRAMPS BAD MO             

[facility]

 

           Drug allergy buspirone  buspirone  DIARRHEA MI             [facility]

 

           Drug allergy amoxicillin Amoxicillin Diarrhea [facility]

 

           Drug allergy tetracycline tetracycline Diarrhea [facility]

 

           Drug allergy codeine    Codeine    ITCHING AND NAUSEA [facility]

 

           Drug allergy nitroglycerin nitroglycerin MIGRAINE HEADACHES [facility]



                                                                                
                                                                             



Family History

          



             Family Member Name Family Member Gender Family Member Status Date o

f Status 

Description                             Data Source(s)

 

           Unknown    Unknown    Problem                          [facility] ([facility], )



                                                                                
       



Encounters

          



           Encounter  Providers  Location   Date       Indications Data Source(s

)

 

                Unknown                         [address]

[place] [address] 10/19/2021 12:00:00 AM 

EDT                                                 eCW1 ([facility])

 

           Outpatient Attender: RON VILLALBA MD Main Office 2021 12:15:00

 PM EDT            

[facility] ([facility])

 

           Outpatient            [facility] 2021 12:00:00 

AM EDT            eCW1 ([facility])

 

                Unknown                         [address]

[place] [address] 2021 12:00:00 AM 

EDT                                                 eCW1 ([facility])

 

                Unknown                         [address] [facility] [address] [facility] [address]

[place] [address] 2021 12:00:00 AM 

EDT                                                 eCW1 ([facility])

 

                Outpatient                      [address] [facility] [address]

[place] [address] 2021 12:00:00 AM

EDT                                                 eCW1 ([facility])

 

                Unknown                         [address]

[place] [address] 2021 12:00:00 AM 

EDT                                                 eCW1 ([facility])

 

             Office Visit Attender: RON VILLALBA MD Main Office  2021 08:

27:00 AM EDT  

                                        MEDENT ([facility])

 

                Outpatient                      [address] [facility]

[place] [address] 2021 12:00:00 AM

EDT                                                 eCW1 ([facility])

 

           Outpatient Attender: RON VILLALBA MD Main Office 07/15/2021 11:00:00

 AM EDT            

MEDGHASSAN ([facility])

 

           Outpatient Attender: RON VILLALBA MD Main Office 06/15/2021 12:00:00

 PM EDT            

MEDENT ([facility])

 

                Unknown                         [address] [facility]

[place] [address] 2021 12:00:00 AM 

EDT                                                 eCW1 ([facility])

 

           Outpatient            [facility] 2021 12:00:00 

AM EDT            eCW1 ([facility])

 

           Outpatient            [facility] 2021 12:00:00 

AM EDT            eCW1 ([facility])

 

                Emergency       Attender: Joselito Vicente DO                 

021 12:54:00 PM EDT - 2021 

03:40:00 PM EDT           L ELBOW/WRIST PAIN        [facility]

l

 

                                        L ELBOW/WRIST PAIN 

 

                                        Patient discharged. 

 

                Outpatient      Attender: Halle Tapia/Daniel/Ronnie/Reindl

 2021 02:00:00 

PM EDT                                              MEDENT ([facility]

 

           Outpatient            [facility] 2021 12:00:00 

AM EDT            eCW1 ([facility])

 

           Outpatient            [facility] 2021 12:00:00 

AM EDT            eCW1 ([facility])

 

           Outpatient            [facility] 2021 12:00:00 

AM EDT            eCW1 ([facility])

 

                    Emergency           Attender: FREIDA Moore: Cinda Martinez PA-C EMERGENCY 

ROOM-ER             2021 03:40:00 PM EDT - 2021 07:32:00 PM EDT     

                [facility]

 

                                        Patient discharged. 

 

           Outpatient Attender: Umair Martinez PA-C            2021 12:55

:00 PM EDT            [facility]

 

           Outpatient            [facility] 2021 12:00:00 

AM EDT            eCW1 ([facility])

 

           Outpatient            [facility] 2021 12:00:00 

AM EDT            eCW1 ([facility])

 

           Outpatient            [facility] 2021 12:00:00 

AM EDT            eCW1 ([facility])

 

           Outpatient            [facility] 2021 12:00:00 

AM EST            eCW1 ([facility])

 

           Outpatient            [facility] 2021 12:00:00 

AM EST            eCW1 ([facility])

 

           Outpatient            [facility] 2021 12:00:00 

AM EST            eCW1 ([facility])

 

           Outpatient            [facility] 2021 12:00:00 

AM EST            eCW1 ([facility])

 

           Outpatient            [facility] 2021 12:00:00 

AM EST            eCW1 ([facility])

 

           Outpatient            [facility] 2021 12:00:00 

AM EST            eCW1 ([facility])

 

           Outpatient            [facility] 2020 12:00:00 

AM EST            eCW1 ([facility])

 

           Outpatient            [facility] 2020 12:00:00 

AM EST            eCW1 ([facility])

 

           Outpatient Attender: Umair Martinez PA-C            2020 01:00

:00 PM EST            [facility]

 

           Outpatient            [facility] 2020 12:00:00 

AM EST            eCW1 ([facility])

 

           Outpatient            [facility] 2020 12:00:00 

AM EST            eCW1 ([facility])

 

           Outpatient            [facility] 2020 12:00:00 

AM EST            eCW1 ([facility])

 

           Outpatient            [facility] 2020 12:00:00 

AM EST            eCW1 ([facility])

 

           Outpatient            [facility] 2020 12:00:00 

AM EST            eCW1 ([facility])

 

           Outpatient            [facility] 2020 12:00:00 

AM EST            eCW1 ([facility])

 

           Outpatient            [facility] 2020 12:00:00 

AM EST            eCW1 ([facility])

 

           Outpatient            [facility] 10/26/2020 12:00:00 

AM EDT            eCW1 ([facility])

 

           Outpatient Attender: Umair Martinez PA-C            10/14/2020 10:30

:00 AM EDT            [facility]

 

           Outpatient            [facility] 10/14/2020 12:00:00 

AM EDT            eCW1 ([facility])

 

           Outpatient            [facility] 10/08/2020 12:00:00 

AM EDT            eCW1 ([facility])

 

                [facility]

ENTER 10/02/2020 12:00:00 AM 

EDT                                                 eCW1 ([facility])

 

           Outpatient            [facility] 10/01/2020 12:00:00 

AM EDT            eCW1 ([facility])

 

           Outpatient Attender: RON BLOCK SR            2020 11:20:00 

AM EDT            [facility]

 

           Outpatient            [facility] 2020 12:00:00 

AM EDT            eCW1 ([facility])

 

           Outpatient            [facility] 2020 12:00:00 

AM EDT            eCW1 ([facility])

 

           Outpatient Attender: RON BLOCK SR            2020 01:37:00 

PM EDT            [facility]

 

           Outpatient            [facility] 2020 12:00:00 

AM EDT            eCW1 ([facility])

 

           Outpatient Attender: RON BLOCK SR            2020 10:30:00 

AM EDT            [facility]

 

           Outpatient            [facility] 2020 12:00:00 

AM EDT            eCW1 ([facility])

 

           Outpatient            [facility] 2020 12:00:00 

AM EDT            eCW1 ([facility])

 

           Outpatient            [facility] 2020 12:00:00 

AM EDT            eCW1 ([facility])

 

           Outpatient Attender: Umair ROSAS            2020 11:30

:00 AM EDT            [facility]

 

           Outpatient Attender: Michelle WELLSC            2020 11:00:0

0 AM EDT            [facility]

 

           Outpatient            [facility] 2020 12:00:00 

AM EDT            eCW1 ([facility])

 

           Outpatient            [facility] 2020 12:00:00 

AM EDT            eCW1 ([facility])

 

           Outpatient            [facility] 2020 12:00:00 

AM EDT            eCW1 ([facility])

 

           Outpatient            [facility] 2020 12:00:00 

AM EDT            eCW1 ([facility])

 

                    Outpatient          Attender: RON BLOCK SRReferrer: PEBBLES BLOCK SR EMERGENCY 

ROOM-RIVCLI         2020 11:30:00 AM EDT - 2020 11:30:00 AM [facility]

 

           Outpatient            [facility] 2020 12:00:00 

AM EDT            eCW1 ([facility])

 

           Outpatient Attender: RON BLOCK SR            2020 10:30:00 

AM [facility]

 

           Outpatient Attender: Bina MARTINEZ            2020 11:50:00

 AM [facility]

 

                    Outpatient          Attender: RON BLOCK SRReferrer: PEBBLES BLOCK SR EMERGENCY 

ROOM-LAB            2020 08:48:00 AM EDT - 2020 08:48:00 AM [facility]

 

           Outpatient Attender: RON BLOCK SR            2020 10:30:00 

AM [facility]

 

           Outpatient Attender: Umair Martinez PA-C            2020 10:00

:00 AM [facility]

 

           Outpatient Attender: RON BLOCK SR            2020 02:00:00 

PM [facility]

 

                    Outpatient          Attender: Teresa Gifford MDReferrer: RON BLOCK SR EMERGENCY 

ROOM-LABOTHPROV     2020 01:30:00 PM EST - 2020 01:30:00 PM [facility]

 

           Outpatient Attender: RON BLOCK SR            2020 01:10:00 

PM [facility]

 

           Outpatient Attender: RON BLOCK SR            2019 12:04:00 

PM [facility]

 

                Outpatient      Attender: RON BLOCK SRReferrer: RON ROE SR                 2019 

02:10:00 PM EST - 2019 02:10:00 PM [facility]

 

                Outpatient      Attender: RON BLOCK SRReferrer: RON ROE SR                 2019 

01:00:00 PM [facility]

 

                Outpatient      Attender: GUERDA CERVANTES MDReferrer: RON ROE SR                 2018 

12:02:00 PM EDT - 2018 12:02:00 PM EDT                           [facility]

 

                Outpatient      Attender: RON BLOCK SRReferrer: RON ROE SR                 2017 

08:00:00 AM EST                                     [facility]

 

                Emergency       Attender: CL PARTIDA PAReferrer: RON KATZ SR                 2017 

04:27:00 PM EDT - 2017 05:01:00 PM EDT                           [facility]

 

                Outpatient      Attender: CORNELIO LEWISeferrer: Michelle RICHEY                 2017 

09:14:00 AM EST                                     [facility]



                                                                                
                                                                                
                                                                                
                                                                                
                                                                                
                                                                                
                                                                                
                                                                                
                                                                                
                                                                                
                                                



Immunizations

          



             Vaccine      Date         Status       Description  Data Source(s)

 

                          2020 02:09:00 PM EST completed                 e

CW1 ([facility])

 

                          2020 02:09:00 PM EST completed                 e

CW1 ([facility])

 

                          2020 02:09:00 PM EST completed                 e

CW1 ([facility])

 

                          2020 02:09:00 PM EST completed                 e

CW1 ([facility])

 

                          2020 02:09:00 PM EST completed                 e

CW1 ([facility])

 

                          2020 02:09:00 PM EST completed                 e

CW1 ([facility])

 

                          2020 02:09:00 PM EST completed                 e

CW1 ([facility])

 

                          2020 02:09:00 PM EST completed                 e

CW1 ([facility])

 

                          2020 02:09:00 PM EST completed                 e

CW1 ([facility])

 

                          2020 02:09:00 PM EST completed                 e

CW1 ([facility])

 

                          2020 02:09:00 PM EST completed                 e

CW1 ([facility])

 

                          2020 02:09:00 PM EST completed                 e

CW1 ([facility])

 

                          2020 02:09:00 PM EST completed                 e

CW1 ([facility])

 

                          2020 02:09:00 PM EST completed                 e

CW1 ([facility])

 

                          2020 02:09:00 PM EST completed                 e

CW1 ([facility]

 

                          2020 02:09:00 PM EST completed                 e

CW1 ([facility])

 

                          2020 02:09:00 PM EST completed                 e

CW1 ([facility])

 

                          2020 02:09:00 PM EST completed                 e

CW1 ([facility])

 

                          10/14/2020 11:12:00 AM EDT completed                 e

CW1 ([facility])

 

                          10/14/2020 11:12:00 AM EDT completed                 e

CW1 ([facility])

 

                          10/14/2020 11:12:00 AM EDT completed                 e

CW1 ([facility])

 

                          10/14/2020 11:12:00 AM EDT completed                 e

CW1 ([facility])

 

                          10/14/2020 11:12:00 AM EDT completed                 e

CW1 ([facility])

 

                          10/14/2020 11:12:00 AM EDT completed                 e

CW1 ([facility])

 

                          10/14/2020 11:12:00 AM EDT completed                 e

CW1 ([facility]

 

                          10/14/2020 11:12:00 AM EDT completed                 e

CW1 ([facility])

 

                          10/14/2020 11:12:00 AM EDT completed                 e

CW1 ([facility])

 

                          10/14/2020 11:12:00 AM EDT completed                 e

CW1 ([facility])

 

                          10/14/2020 11:12:00 AM EDT completed                 e

CW1 ([facility]

 

                          10/14/2020 11:12:00 AM EDT completed                 e

CW1 ([facility])

 

                          10/14/2020 11:12:00 AM EDT completed                 e

CW1 ([facility])

 

                          10/14/2020 11:12:00 AM EDT completed                 e

CW1 ([facility])

 

                          10/14/2020 11:12:00 AM EDT completed                 e

CW1 ([facility])

 

                          10/14/2020 11:12:00 AM EDT completed                 e

CW1 ([facility])

 

                          10/14/2020 11:12:00 AM EDT completed                 e

CW1 ([facility])

 

                          10/14/2020 11:12:00 AM EDT completed                 e

CW1 ([facility])

 

                          10/14/2020 11:12:00 AM EDT completed                 e

CW1 ([facility])

 

                          10/14/2020 11:12:00 AM EDT completed                 e

CW1 ([facility])

 

                          10/14/2020 11:12:00 AM EDT completed                 e

CW1 ([facility])

 

                          10/14/2020 11:12:00 AM EDT completed                 e

CW1 ([facility])

 

                          10/14/2020 11:12:00 AM EDT completed                 e

CW1 ([facility])

 

                          10/14/2020 11:12:00 AM EDT completed                 e

CW1 ([facility])

 

                          10/14/2020 11:12:00 AM EDT completed                 e

CW1 ([facility])

 

                          10/14/2020 11:12:00 AM EDT completed                 e

CW1 ([facility])

 

                          10/14/2020 11:12:00 AM EDT completed                 e

CW1 ([facility])

 

             New in .  IIV4 10/14/2020 11:11:00 AM EDT completed            

     eCW1 ([facility])

 

             New in .  IIV4 10/14/2020 11:11:00 AM EDT completed            

     eCW1 ([facility]

 

             New in .  IIV4 10/14/2020 11:11:00 AM EDT completed            

     eCW1 ([facility]

 

             New in .  IIV4 10/14/2020 11:11:00 AM EDT completed            

     eCW1 ([facility]

 

             New in .  IIV4 10/14/2020 11:11:00 AM EDT completed            

     eCW1 ([facility]

 

             New in .  IIV4 10/14/2020 11:11:00 AM EDT completed            

     eCW1 ([facility]

 

             New in .  IIV4 10/14/2020 11:11:00 AM EDT completed            

     eCW1 ([facility])

 

             New in .  IIV4 10/14/2020 11:11:00 AM EDT completed            

     eCW1 ([facility]

 

             New in .  IIV4 10/14/2020 11:11:00 AM EDT completed            

     eCW1 ([facility]

 

             New in .  IIV4 10/14/2020 11:11:00 AM EDT completed            

     eCW1 ([facility]

 

             New in .  IIV4 10/14/2020 11:11:00 AM EDT completed            

     eCW1 ([facility])

 

             New in .  IIV4 10/14/2020 11:11:00 AM EDT completed            

     eCW1 ([facility]

 

             New in .  IIV4 10/14/2020 11:11:00 AM EDT completed            

     eCW1 ([facility])

 

             New in .  IIV4 10/14/2020 11:11:00 AM EDT completed            

     eCW1 ([facility])

 

             New in .  IIV4 10/14/2020 11:11:00 AM EDT completed            

     eCW1 ([facility])

 

             New in .  IIV4 10/14/2020 11:11:00 AM EDT completed            

     eCW1 ([facility])

 

             New in .  IIV4 10/14/2020 11:11:00 AM EDT completed            

     eCW1 ([facility])

 

             New in .  IIV4 10/14/2020 11:11:00 AM EDT completed            

     eCW1 ([facility])

 

             New in .  IIV4 10/14/2020 11:11:00 AM EDT completed            

     eCW1 ([facility])

 

             New in .  IIV4 10/14/2020 11:11:00 AM EDT completed            

     eCW1 ([facility])

 

             New in .  IIV4 10/14/2020 11:11:00 AM EDT completed            

     eCW1 ([facility])

 

             New in .  IIV4 10/14/2020 11:11:00 AM EDT completed            

     eCW1 ([facility])

 

             New in .  IIV4 10/14/2020 11:11:00 AM EDT completed            

     eCW1 ([facility])

 

             New in .  IIV4 10/14/2020 11:11:00 AM EDT completed            

     eCW1 ([facility])

 

             New in .  IIV4 10/14/2020 11:11:00 AM EDT completed            

     eCW1 ([facility])

 

             New in .  IIV4 10/14/2020 11:11:00 AM EDT completed            

     eCW1 ([facility])

 

             New in .  IIV4 10/14/2020 11:11:00 AM EDT completed            

     eCW1 ([facility])

 

                          2020 10:12:00 AM EDT completed                 e

CW1 ([facility])

 

                          2020 10:12:00 AM EDT completed                 e

CW1 ([facility])

 

                          2020 10:12:00 AM EDT completed                 e

CW1 ([facility])

 

                          2020 10:12:00 AM EDT completed                 e

CW1 ([facility])

 

                          2020 10:12:00 AM EDT completed                 e

CW1 ([facility])

 

                          2020 10:12:00 AM EDT completed                 e

CW1 ([facility])

 

                          2020 10:12:00 AM EDT completed                 e

CW1 ([facility])

 

                          2020 10:12:00 AM EDT completed                 e

CW1 ([facility])

 

                          2020 10:12:00 AM EDT completed                 e

CW1 ([facility])

 

                          2020 10:12:00 AM EDT completed                 e

CW1 ([facility])

 

                          2020 10:12:00 AM EDT completed                 e

CW1 ([facility])

 

                          2020 10:12:00 AM EDT completed                 e

CW1 ([facility])

 

                          2020 10:12:00 AM EDT completed                 e

CW1 ([facility])

 

                          2020 10:12:00 AM EDT completed                 e

CW1 ([facility])

 

                          2020 10:12:00 AM EDT completed                 e

CW1 ([facility])

 

                          2020 10:12:00 AM EDT completed                 e

CW1 ([facility])

 

                          2020 10:12:00 AM EDT completed                 e

CW1 ([facility])

 

                          2020 10:12:00 AM EDT completed                 e

CW1 ([facility])

 

                          2020 10:12:00 AM EDT completed                 e

CW1 ([facility])

 

                          2020 10:12:00 AM EDT completed                 e

CW1 ([facility])

 

                          2020 10:12:00 AM EDT completed                 e

CW1 ([facility])

 

                          2020 10:12:00 AM EDT completed                 e

CW1 ([facility])

 

                          2020 10:12:00 AM EDT completed                 e

CW1 ([facility])

 

                          2020 10:12:00 AM EDT completed                 e

CW1 ([facility])

 

                          2020 10:12:00 AM EDT completed                 e

CW1 ([facility])

 

                          2020 10:12:00 AM EDT completed                 e

CW1 ([facility])

 

                          2020 10:12:00 AM EDT completed                 e

CW1 ([facility])

 

                          2020 10:12:00 AM EDT completed                 e

CW1 ([facility])

 

                          2020 10:12:00 AM EDT completed                 e

CW1 ([facility])

 

                          2020 10:12:00 AM EDT completed                 e

CW1 ([facility])

 

                          2020 10:12:00 AM EDT completed                 e

CW1 ([facility])

 

                          2020 10:12:00 AM EDT completed                 e

CW1 ([facility])

 

                          2020 10:12:00 AM EDT completed                 e

CW1 ([facility])

 

                          2020 10:12:00 AM EDT completed                 e

CW1 ([facility])



                                                                                
                                                                                
                                                                                
                                                                                
                                                                                
                                                                                
                                                                                
                                                                                
                                                                                
                                                                                
                                                                                
                                                                                
                                                                                
                                                                                
    



Medications

          



          Medication Brand Name Start Date Product Form Dose      Route     Admi

nistrative 

Instructions Pharmacy Instructions Status     Indications Reaction   Description

 Data 

Source(s)

 

                          12 HR ranolazine 1000 MG Extended Release Oral Tablet 

[Ranexa] Ranexa 1000 MG 

Ranexa 1000 MG 10/19/2021 12:00:00 AM EDT        1.0 {tablet}                   

   active               Ranexa

 1000 MG                                eCW1 ([facility])

 

        Famotidine 40 MG Oral Tablet Famotidine 2021 12:00:00 AM EDT      

           ORAL                    

active                                                          MEDENT ([facility])

 

                    icosapent ethyl 1000 MG Oral Capsule [Vascepa] Vascepa      

       2021 12:00:00 AM 

EDT                  ORAL                 active                      MEDENT ([facility]

[facility])

 

           nebivolol 5 MG Oral Tablet [Bystolic] Bystolic   2021 12:00:00 

AM EDT                       

ORAL                          active                                  MEDENT ([facility]

[facility])

 

        Biotin 2.5 MG Oral Capsule Biotin  2021 12:00:00 AM EDT           

      ORAL                    active

                                                                MEDENT ([facility]

[facility])

 

        valsartan 80 MG Oral Tablet Valsartan 2021 12:00:00 AM EDT        

         ORAL                    

active                                                          MEDENT ([facility]

[facility])

 

                    Metoprolol Tartrate 25 MG Oral Tablet Metoprolol Tartrate  12:00:00 AM

 EDT                 ORAL                 completed                      MEDENT 

([facility])

 

             Amlodipine 2.5 MG Oral Tablet Amlodipine Besylate 2021 12:00:

00 AM EDT               

        ORAL                    active                          MEDENT ([facility]

[facility])

 

                    12 HR ranolazine 500 MG Extended Release Oral Tablet Ranolaz

ine ER       2021 

12:00:00 AM EDT               ORAL                 active                      M

EDENT ([facility])

 

                    1 ML alirocumab 150 MG/ML Auto-Injector [Praluent] Praluent 

           2021 12:00:00 

AM EDT               SUBCUTANEOUS               active                      MEDE

NT ([facility])

 

                    Promethazine Hydrochloride 25 MG Oral Tablet Promethazine HC

L    2021 

12:00:00 AM EDT               ORAL                 active                      M

EDENT ([facility])

 

     Zinc Chelated      2021 12:00:00 AM EDT           ORAL           acti

ve                MEDENT 

([facility])

 

     Magnesium      2021 12:00:00 AM EDT                          active  

              MEDENT ([facility])

 

     Probiotic Probiotic 2021 12:00:00 AM EDT           ORAL           act

juany                MEDENT 

([facility])

 

             Amlodipine 2.5 MG Oral Tablet Amlodipine Besylate 07/15/2021 12:00:

00 AM EDT               

        ORAL                    completed                         MED[facility] ([facility]

[facility])

 

                    Metoprolol Tartrate 25 MG Oral Tablet Metoprolol Tartrate  12:00:00 AM

 EDT                 ORAL                 completed                      MEDENT 

([facility])

 

          carvedilol 3.125 MG Oral Tablet Carvedilol 06/15/2021 12:00:00 AM EDT 

                    ORAL       

                      active                                      MEDENT ([facility]

[facility])

 

                    24 HR Nitroglycerin 0.6 MG/HR Transdermal Patch Nitroglyceri

n       06/15/2021 

12:00:00 AM EDT                                    active                      M

EDENT ([facility])

 

                Repatha Pushtronex System Repatha Pushtronex System 06/15/2021 1

2:00:00 AM EDT  

                                        active                          MEDENT (

[facility])

 

          carvedilol 3.125 MG Oral Tablet Carvedilol 06/15/2021 12:00:00 AM EDT 

                    ORAL       

                      completed                                   [facility] ([facility]

[facility])

 

                    24 HR Nitroglycerin 0.6 MG/HR Transdermal Patch Nitroglyceri

n       06/15/2021 

12:00:00 AM EDT                                    active                      M

EDENT ([facility])

 

                Repatha Pushtronex System Repatha Pushtronex System 06/15/2021 1

2:00:00 AM EDT  

                                        completed                         MEDENT

 ([facility])

 

           clopidogrel 75 MG Oral Tablet [Plavix] Plavix     2021 12:00:00

 AM EDT                       

ORAL                          active                                  MEDENT ([facility]

[facility])

 

             Aspirin 325 MG Delayed Release Oral Tablet Aspirin      2021 

12:00:00 AM EDT              

        ORAL                    active                          [facility] ([facility]

[facility])

 

                          Vitamin B 12 1 MG/ML Injectable Solution Vitamin Defic

iency Injectable 

System-B12 2021 12:00:00 AM EDT                               active      

             MEDENT ([facility] 

[facility])

 

                    12 HR ranolazine 500 MG Extended Release Oral Tablet [Ranexa

] Ranexa              2021 

12:00:00 AM EDT               ORAL                 completed                    

  [facility] ([facility]

 

                    Metoprolol Tartrate 25 MG Oral Tablet Metoprolol Tartrate  12:00:00 AM

 EDT                 ORAL                 completed                      MEDENT 

([facility])

 

                    Meclizine Hydrochloride 25 MG Chewable Tablet Meclizine HCL 

      2021 12:00:00 

AM EDT               ORAL                 active                      [facility] ([facility]

[facility])

 

                Metoclopramide 10 MG Oral Tablet Metoclopramide HCL 2021 1

2:00:00 AM EDT  

                ORAL                    completed                         MED[facility]

 ([facility])

 

                    pantoprazole 40 MG Delayed Release Oral Tablet Pantoprazole 

Sodium 2021 

12:00:00 AM EDT               ORAL                 completed                    

  MEDENT ([facility])

 

                    Acetaminophen 500 MG Oral Tablet [Tylenol] Tylenol Extra Str

ength 2021 

12:00:00 AM EDT               ORAL                 active                      M

EDENT ([facility])

 

                    tramadol hydrochloride 50 MG Oral Tablet Tramadol HCL       

 2021 12:00:00 AM EDT

                     ORAL                 active                      MEDENT ([facility]

[facility])

 

                Nitroglycerin 0.4 MG Sublingual Tablet Nitroglycerin    12:00:00 AM EDT 

                SUBLINGUAL                 active                          MEDEN

T ([facility])

 

          Allopurinol 300 MG Oral Tablet Allopurinol 2021 12:00:00 AM EDT 

                    ORAL       

                      active                                      [facility] ([facility]

[facility])

 

                    Ergocalciferol 30578 UNT Oral Capsule Vitamin D (Ergocalcife

rol) 2021 

12:00:00 AM EDT               ORAL                 active                      M

EDENT ([facility])

 

             Amlodipine 2.5 MG Oral Tablet Amlodipine Besylate 2021 12:00:

00 AM EDT               

        ORAL                    active                          MEDENT ([facility]

 [facility])

 

                Escitalopram 10 MG Oral Tablet Escitalopram Oxalate 2021 1

2:00:00 AM EDT  

                ORAL                    active                          MEDENT (

[facility])

 

                    60 ACTUAT Albuterol 0.09 MG/ACTUAT Metered Dose Inhaler Albu

terol Sulfate HFA 

2021 12:00:00 AM EDT               RESPIRATORY               active       

               MEDENT ([facility] 

[facility])

 

        valsartan 80 MG Oral Tablet Valsartan 2021 12:00:00 AM EDT        

         ORAL                    

active                                                          [facility] ([facility]

 [facility])

 

                    Metoprolol Tartrate 25 MG Oral Tablet Metoprolol Tartrate  12:00:00 AM

 EDT                 ORAL                 completed                      MEDENT 

([facility])

 

                          Vitamin B 12 1 MG/ML Injectable Solution Vitamin Defic

iency Injectable 

System-B12 2021 12:00:00 AM EDT                               active      

             [facility] ([facility] 

[facility])

 

                    Meclizine Hydrochloride 25 MG Chewable Tablet Meclizine HCL 

      2021 12:00:00 

AM EDT               ORAL                 active                      MED[facility] ([facility]

[facility])

 

                    12 HR ranolazine 500 MG Extended Release Oral Tablet [Ranexa

] Ranexa              2021 

12:00:00 AM EDT               ORAL                 active                      M

[facility] ([facility]

 

                    pantoprazole 40 MG Delayed Release Oral Tablet Pantoprazole 

Sodium 2021 

12:00:00 AM EDT               ORAL                 active                      M

EDENT ([facility]

 

                Metoclopramide 10 MG Oral Tablet Metoclopramide HCL 2021 1

2:00:00 AM EDT  

                ORAL                    active                          [facility] (

[facility]

 

                    tramadol hydrochloride 50 MG Oral Tablet Tramadol HCL       

 2021 12:00:00 AM EDT

                     ORAL                 active                      [facility] ([facility])

 

                    Acetaminophen 500 MG Oral Tablet [Tylenol] Tylenol Extra Str

ength 2021 

12:00:00 AM EDT               ORAL                 active                      M

EDENT ([facility]

 

                Nitroglycerin 0.4 MG Sublingual Tablet Nitroglycerin    12:00:00 AM EDT 

                SUBLINGUAL                 active                          MEDEN

T ([facility]

 

             Aspirin 325 MG Delayed Release Oral Tablet Aspirin      2021 

12:00:00 AM EDT              

        ORAL                    active                          [facility] ([facility]

 

        febuxostat 40 MG Oral Tablet Febuxostat 2021 12:00:00 AM EDT      

           ORAL                    

active                                                          [facility] ([facility]

 

           clopidogrel 75 MG Oral Tablet [Plavix] Plavix     2021 12:00:00

 AM EDT                       

ORAL                          active                                  [facility] ([facility])

 

                    Ergocalciferol 02400 UNT Oral Capsule Vitamin D (Ergocalcife

rol) 2021 

12:00:00 AM EDT               ORAL                 active                      M

EDENT ([facility]

 

          Allopurinol 300 MG Oral Tablet Allopurinol 2021 12:00:00 AM EDT 

                    ORAL       

                      active                                      MED[facility] ([facility]

[facility])

 

        febuxostat 40 MG Oral Tablet Febuxostat 2021 12:00:00 AM EDT      

           ORAL                    

completed                                                       [facility] ([facility]

[facility])

 

                Escitalopram 10 MG Oral Tablet Escitalopram Oxalate 2021 1

2:00:00 AM EDT  

                ORAL                    active                          MEDENT (

[facility])

 

             Amlodipine 2.5 MG Oral Tablet Amlodipine Besylate 2021 12:00:

00 AM EDT               

        ORAL                    completed                         [facility] ([facility]

[facility])

 

        valsartan 80 MG Oral Tablet Valsartan 2021 12:00:00 AM EDT        

         ORAL                    

completed                                                       [facility] ([facility]

[facility])

 

                    60 ACTUAT Albuterol 0.09 MG/ACTUAT Metered Dose Inhaler Albu

terol Sulfate HFA 

2021 12:00:00 AM EDT               RESPIRATORY               active       

               MED[facility] ([facility])

 

                    Escitalopram 10 MG Oral Tablet Escitalopram Oxalate Escitalo

pram Oxalate 

2021 01:58:22 PM EDT        10 MG                       active            

          [facility]

 

                          Amlodipine 2.5 MG Oral Tablet Amlodipine (Norvasc) 2.5

 mg tablet Amlodipine 

(Norvasc) 2.5 mg tablet 2021 01:58:22 PM EDT       2.5 MG                 

  active                   

[facility]

 

                    Albuterol Sulfate (Proventil Hfa) 90 mcg/actuation HFA aeros

ol inhaler                     

2021 01:58:22 PM EDT        90 MCG                      active            

          [facility]

 

     Febuxostat      2021 01:58:22 PM EDT      40 MG                active

                [facility]

 

                          valsartan 80 MG Oral Tablet Valsartan (Diovan) 80 mg t

ablet Valsartan (Diovan) 

80 mg tablet 2021 01:58:22 PM EDT       80 MG                   active    

               [facility]

 

                    60 ACTUAT Albuterol 0.09 MG/ACTUAT Metered Dose Inhaler Albu

terol Sulfate HFA 

2021 12:00:00 AM EDT               RESPIRATORY               active       

               MEDENT ([facility] )

 

                    valsartan 160 MG Oral Tablet Valsartan 160 MG Valsartan 160 

MG    2021 

12:00:00 AM EST        1.0 {tablet}                      active               Va

lsartan 160 MG eCW1 ([facility])

 

                    valsartan 160 MG Oral Tablet Valsartan 160 MG Valsartan 160 

MG    2021 

12:00:00 AM EST        1.0 {tablet}                      active               Va

lsartan 160 MG eCW1 ([facility])

 

                    valsartan 160 MG Oral Tablet Valsartan 160 MG Valsartan 160 

MG    2021 

12:00:00 AM EST        1.0 {tablet}                      active               Va

lsartan 160 MG eCW1 ([facility])

 

                    valsartan 160 MG Oral Tablet Valsartan 160 MG Valsartan 160 

MG    2021 

12:00:00 AM EST        1.0 {tablet}                      active               Va

lsartan 160 MG eCW1 ([facility])

 

                    valsartan 160 MG Oral Tablet Valsartan 160 MG Valsartan 160 

MG    2021 

12:00:00 AM EST        1.0 {tablet}                      active               Va

lsartan 160 MG eCW1 ([facility])

 

                    valsartan 160 MG Oral Tablet Valsartan 160 MG Valsartan 160 

MG    2021 

12:00:00 AM EST        1.0 {tablet}                      active               Va

lsartan 160 MG eCW1 ([facility])

 

                    valsartan 160 MG Oral Tablet Valsartan 160 MG Valsartan 160 

MG    2021 

12:00:00 AM EST        1.0 {tablet}                      active               Va

lsartan 160 MG eCW1 ([facility])

 

                    valsartan 160 MG Oral Tablet Valsartan 160 MG Valsartan 160 

MG    2021 

12:00:00 AM EST        1.0 {tablet}                      active               Va

lsartan 160 MG eCW1 ([facility])

 

                    valsartan 160 MG Oral Tablet Valsartan 160 MG Valsartan 160 

MG    2021 

12:00:00 AM EST        1.0 {tablet}                      active               Va

lsartan 160 MG eCW1 ([facility])

 

                    valsartan 160 MG Oral Tablet Valsartan 160 MG Valsartan 160 

MG    2021 

12:00:00 AM EST        1.0 {tablet}                      active               Va

lsartan 160 MG eCW1 ([facility])

 

                    valsartan 160 MG Oral Tablet Valsartan 160 MG Valsartan 160 

MG    2021 

12:00:00 AM EST        1.0 {tablet}                      active               Va

lsartan 160 MG eCW1 ([facility])

 

                    valsartan 160 MG Oral Tablet Valsartan 160 MG Valsartan 160 

MG    2021 

12:00:00 AM EST        1.0 {tablet}                      active               Va

lsartan 160 MG eCW1 ([facility])

 

                    valsartan 160 MG Oral Tablet Valsartan 160 MG Valsartan 160 

MG    2021 

12:00:00 AM EST        1.0 {tablet}                      active               Va

lsartan 160 MG eCW1 ([facility])

 

                    valsartan 160 MG Oral Tablet Valsartan 160 MG Valsartan 160 

MG    2021 

12:00:00 AM EST        1.0 {tablet}                      active               Va

lsartan 160 MG eCW1 ([facility])

 

                    valsartan 160 MG Oral Tablet Valsartan 160 MG Valsartan 160 

MG    2021 

12:00:00 AM EST        1.0 {tablet}                      active               Va

lsartan 160 MG eCW1 ([facility])

 

                    Fluconazole 150 MG Oral Tablet [Diflucan] Diflucan 150 MG Di

flucan 150 MG     

2020 12:00:00 AM EDT        1.0 {tablet}                      active      

         Diflucan 150 MG eCW1 

([facility])

 

                          Nystatin 100 UNT/MG Topical Ointment Nystatin 237529 U

NIT/GM Nystatin 464191 

UNIT/GM 2020 12:00:00 AM EDT        1.0 {application}                     

 active               Nystatin

 [id number] UNIT/GM                         eCW1 ([facility]

samuel)

 

                          Nystatin 100 UNT/MG Topical Ointment Nystatin 637580 U

NIT/GM Nystatin [id number] 

UNIT/GM 2020 12:00:00 AM EDT        1.0 {application}                     

 active               Nystatin

 102609 UNIT/GM                         eCW1 ([facility]

samuel)

 

                    Fluconazole 150 MG Oral Tablet [Diflucan] Diflucan 150 MG Di

flucan 150 MG     

2020 12:00:00 AM EDT        1.0 {tablet}                      active      

         Diflucan 150 MG eCW1 

([facility])

 

                          Nystatin 100 UNT/MG Topical Ointment Nystatin 479641 U

NIT/GM Nystatin [id number] 

UNIT/GM 2020 12:00:00 AM EDT        1.0 {application}                     

 active               Nystatin

 845116 UNIT/GM                         eCW1 ([facility])

 

                          Nystatin 100 UNT/MG Topical Ointment Nystatin 642706 U

NIT/GM Nystatin 722010 

UNIT/GM 2020 12:00:00 AM EDT        1.0 {application}                     

 active               Nystatin

 894594 UNIT/GM                         eCW1 ([facility])

 

                          Nystatin 100 UNT/MG Topical Ointment Nystatin 941503 U

NIT/GM Nystatin 861678 

UNIT/GM 2020 12:00:00 AM EDT        1.0 {application}                     

 active               Nystatin

 066377 UNIT/GM                         eCW1 ([facility])

 

                          Nystatin 100 UNT/MG Topical Ointment Nystatin 484794 U

NIT/GM Nystatin 919218 

UNIT/GM 2020 12:00:00 AM EDT        1.0 {application}                     

 active               Nystatin

 069642 UNIT/GM                         eCW1 ([facility])

 

                          Nystatin 100 UNT/MG Topical Ointment Nystatin 522369 U

NIT/GM Nystatin 233597 

UNIT/GM 2020 12:00:00 AM EDT        1.0 {application}                     

 active               Nystatin

 895597 UNIT/GM                         eCW1 ([facility]

samuel)

 

                          Nystatin 100 UNT/MG Topical Ointment Nystatin 172509 U

NIT/GM Nystatin 830770 

UNIT/GM 2020 12:00:00 AM EDT        1.0 {application}                     

 active               Nystatin

 339924 UNIT/GM                         eCW1 ([facility])

 

                          Nystatin 100 UNT/MG Topical Ointment Nystatin 650886 U

NIT/GM Nystatin 143903 

UNIT/GM 2020 12:00:00 AM EDT        1.0 {application}                     

 active               Nystatin

 207924 UNIT/GM                         eCW1 ([facility])

 

                          Nystatin 100 UNT/MG Topical Ointment Nystatin 857324 U

NIT/GM Nystatin 168876 

UNIT/GM 2020 12:00:00 AM EDT        1.0 {application}                     

 active               Nystatin

 046332 UNIT/GM                         [facility]

[facility])

 

                          Nystatin 100 UNT/MG Topical Ointment Nystatin 319153 U

NIT/GM Nystatin 928073 

UNIT/GM 2020 12:00:00 AM EDT        1.0 {application}                     

 active               Nystatin

 457330 UNIT/GM                         [facility]CW[facility]

[facility])

 

                          Nystatin 100 UNT/MG Topical Ointment Nystatin 400574 U

NIT/GM Nystatin 701709 

UNIT/GM 2020 12:00:00 AM EDT        1.0 {application}                     

 active               Nystatin

 747542 UNIT/GM                         eCW1 ([facility]

samuel)

 

                          Nystatin 100 UNT/MG Topical Ointment Nystatin 766245 U

NIT/GM Nystatin 975902 

UNIT/GM 2020 12:00:00 AM EDT        1.0 {application}                     

 active               Nystatin

 414587 UNIT/GM                         [facility]CW1 [facility]

[facility])

 

                          Nystatin 100 UNT/MG Topical Ointment Nystatin 875995 U

NIT/GM Nystatin 130358 

UNIT/GM 2020 12:00:00 AM EDT        1.0 {application}                     

 active               Nystatin

 937144 UNIT/GM                         eCW1 [facility]

[facility])

 

                          Nystatin 100 UNT/MG Topical Ointment Nystatin 387253 U

NIT/GM Nystatin 774088 

UNIT/GM 2020 12:00:00 AM EDT        1.0 {application}                     

 active               Nystatin

 752996 UNIT/GM                         eCW1 ([facility]

[facility])

 

                          Nystatin 100 UNT/MG Topical Ointment Nystatin 282843 U

NIT/GM Nystatin 510851 

UNIT/GM 2020 12:00:00 AM EDT        1.0 {application}                     

 active               Nystatin

 825490 UNIT/GM                         eCW1 ([facility]

[facility])

 

                          Nystatin 100 UNT/MG Topical Ointment Nystatin 667125 U

NIT/GM Nystatin 285552 

UNIT/GM 2020 12:00:00 AM EDT        1.0 {application}                     

 active               Nystatin

 939576 UNIT/GM                         eCW1 ([facility]

[facility])

 

                          Nystatin 100 UNT/MG Topical Ointment Nystatin 835742 U

NIT/GM Nystatin 715130 

UNIT/GM 2020 12:00:00 AM EDT        1.0 {application}                     

 active               Nystatin

 517454 UNIT/GM                         eCW1 ([facility]

samuel)

 

                          Nystatin 100 UNT/MG Topical Ointment Nystatin 151565 U

NIT/GM Nystatin 599761 

UNIT/GM 2020 12:00:00 AM EDT        1.0 {application}                     

 active               Nystatin

 758980 UNIT/GM                         eCW1 ([facility]

samuel)

 

                          Nystatin 100 UNT/MG Topical Ointment Nystatin 040963 U

NIT/GM Nystatin 198368 

UNIT/GM 2020 12:00:00 AM EDT        1.0 {application}                     

 active               Nystatin

 432430 UNIT/GM                         eCW1 ([facility]

samuel)

 

                          Nystatin 100 UNT/MG Topical Ointment Nystatin 066038 U

NIT/GM Nystatin 984591 

UNIT/GM 2020 12:00:00 AM EDT        1.0 {application}                     

 active               Nystatin

 563545 UNIT/GM                         eCW1 ([facility]

samuel)

 

                          Nystatin 100 UNT/MG Topical Ointment Nystatin 616908 U

NIT/GM Nystatin 909837 

UNIT/GM 2020 12:00:00 AM EDT        1.0 {application}                     

 active               Nystatin

 169877 UNIT/GM                         eCW1 ([facility]

samuel)

 

                          Nystatin 100 UNT/MG Topical Ointment Nystatin 410353 U

NIT/GM Nystatin 217743 

UNIT/GM 2020 12:00:00 AM EDT        1.0 {application}                     

 active               Nystatin

 042075 UNIT/GM                         eCW1 ([facility]

asmuel)

 

                          Nystatin 100 UNT/MG Topical Ointment Nystatin 163050 U

NIT/GM Nystatin 270870 

UNIT/GM 2020 12:00:00 AM EDT        1.0 {application}                     

 active               Nystatin

 913354 UNIT/GM                         eCW1 ([facility]

samuel)

 

                          Nystatin 100 UNT/MG Topical Ointment Nystatin 864490 U

NIT/GM Nystatin 786113 

UNIT/GM 2020 12:00:00 AM EDT        1.0 {application}                     

 active               Nystatin

 449251 UNIT/GM                         eCW1 ([facility]

[facility])

 

                    Fluconazole 150 MG Oral Tablet [Diflucan] Diflucan 150 MG Di

flucan 150 MG     

2020 12:00:00 AM EDT        1.0 {tablet}                      active      

         Diflucan 150 MG eCW1 

([facility])

 

                          Nystatin 100 UNT/MG Topical Ointment Nystatin 130579 U

NIT/GM Nystatin 002836 

UNIT/GM 2020 12:00:00 AM EDT        1.0 {application}                     

 active               Nystatin

 673359 UNIT/GM                         eCW1 ([facility]

[facility])

 

                          Nystatin 100 UNT/MG Topical Ointment Nystatin 375869 U

NIT/GM Nystatin 195631 

UNIT/GM 2020 12:00:00 AM EDT        1.0 {application}                     

 active               Nystatin

 310051 UNIT/GM                         eCW1 ([facility]

samuel)

 

                          Nystatin 100 UNT/MG Topical Ointment Nystatin 426927 U

NIT/GM Nystatin 281658 

UNIT/GM 2020 12:00:00 AM EDT        1.0 {application}                     

 active               Nystatin

 846120 UNIT/GM                         eCW1 ([facility]

[facility])

 

                          Nystatin 100 UNT/MG Topical Ointment Nystatin 912552 U

NIT/GM Nystatin 445588 

UNIT/GM 2020 12:00:00 AM EDT        1.0 {application}                     

 active               Nystatin

 895158 UNIT/GM                         eCW1 ([facility]

samuel)

 

                          Nystatin 100 UNT/MG Topical Ointment Nystatin 299028 U

NIT/GM Nystatin 150814 

UNIT/GM 2020 12:00:00 AM EDT        1.0 {application}                     

 active               Nystatin

 807825 UNIT/GM                         eCW1 ([facility]

[facility])

 

                          Nystatin 100 UNT/MG Topical Ointment Nystatin 133222 U

NIT/GM Nystatin 109287 

UNIT/GM 2020 12:00:00 AM EDT        1.0 {application}                     

 active               Nystatin

 935892 UNIT/GM                         eCW1 ([facility]

[facility])

 

                          Nystatin 100 UNT/MG Topical Ointment Nystatin 325637 U

NIT/GM Nystatin 219020 

UNIT/GM 2020 12:00:00 AM EDT        1.0 {application}                     

 active               Nystatin

 213011 UNIT/GM                         eCW1 ([facility]

samuel)

 

                          Nystatin 100 UNT/MG Topical Ointment Nystatin 183690 U

NIT/GM Nystatin 078058 

UNIT/GM 2020 12:00:00 AM EDT        1.0 {application}                     

 active               Nystatin

 379753 UNIT/GM                         eCW1 ([facility]

samuel)

 

                          Nystatin 100 UNT/MG Topical Ointment Nystatin 816003 U

NIT/GM Nystatin 762227 

UNIT/GM 2020 12:00:00 AM EDT        1.0 {application}                     

 active               Nystatin

 423183 UNIT/GM                         eCW1 ([facility]

samuel)

 

                          Nystatin 100 UNT/MG Topical Ointment Nystatin 560289 U

NIT/GM Nystatin 521072 

UNIT/GM 2020 12:00:00 AM EDT        1.0 {application}                     

 active               Nystatin

 333952 UNIT/GM                         eCW1 ([facility]

samuel)

 

                          Nystatin 100 UNT/MG Topical Ointment Nystatin 099871 U

NIT/GM Nystatin 879676 

UNIT/GM 2020 12:00:00 AM EDT        1.0 {application}                     

 active               Nystatin

 531166 UNIT/GM                         eCW1 ([facility]

samuel)

 

                          Nystatin 100 UNT/MG Topical Ointment Nystatin 047402 U

NIT/GM Nystatin 316424 

UNIT/GM 2020 12:00:00 AM EDT        1.0 {application}                     

 active               Nystatin

 223800 UNIT/GM                         eCW1 ([facility]

samuel)

 

                          1 ML evolocumab 140 MG/ML Prefilled Syringe [Repatha] 

Repatha 140 MG/ML Repatha 

140 MG/ML 2020 12:00:00 AM EDT        1.0 {ml}                      active

               Repatha 140 

MG/ML                                   eCW1 ([facility]

samuel)

 

                          1 ML evolocumab 140 MG/ML Prefilled Syringe [Repatha] 

Repatha 140 MG/ML Repatha 

140 MG/ML 2020 12:00:00 AM EDT        1.0 {ml}                      active

               Repatha 140 

MG/ML                                   eCW1 ([facility]

samuel)

 

                          1 ML evolocumab 140 MG/ML Prefilled Syringe [Repatha] 

Repatha 140 MG/ML Repatha 

140 MG/ML 2020 12:00:00 AM EDT        1.0 {ml}                      active

               Repatha 140 

MG/ML                                   eCW1 ([facility]

[facility])

 

                          1 ML evolocumab 140 MG/ML Prefilled Syringe [Repatha] 

Repatha 140 MG/ML Repatha 

140 MG/ML 2020 12:00:00 AM EDT        1.0 {ml}                      active

               Repatha 140 

MG/ML                                   eCW1 ([facility]

[facility])

 

                          1 ML evolocumab 140 MG/ML Prefilled Syringe [Repatha] 

Repatha 140 MG/ML Repatha 

140 MG/ML 2020 12:00:00 AM EDT        1.0 {ml}                      active

               Repatha 140 

MG/ML                                   eCW1 ([facility]

samuel)

 

                          1 ML evolocumab 140 MG/ML Prefilled Syringe [Repatha] 

Repatha 140 MG/ML Repatha 

140 MG/ML 2020 12:00:00 AM EDT        1.0 {ml}                      active

               Repatha 140 

MG/ML                                   eCW1 ([facility]

[facility])

 

                          1 ML evolocumab 140 MG/ML Prefilled Syringe [Repatha] 

Repatha 140 MG/ML Repatha 

140 MG/ML 2020 12:00:00 AM EDT        1.0 {ml}                      active

               Repatha 140 

MG/ML                                   eCW1 ([facility]

[facility])

 

                          1 ML evolocumab 140 MG/ML Prefilled Syringe [Repatha] 

Repatha 140 MG/ML Repatha 

140 MG/ML 2020 12:00:00 AM EDT        1.0 {ml}                      active

               Repatha 140 

MG/ML                                   eCW1 ([facility]

[facility])

 

                          1 ML evolocumab 140 MG/ML Prefilled Syringe [Repatha] 

Repatha 140 MG/ML Repatha 

140 MG/ML 2020 12:00:00 AM EDT        1.0 {ml}                      active

               Repatha 140 

MG/ML                                   eCW1 ([facility]

[facility])

 

                          1 ML evolocumab 140 MG/ML Prefilled Syringe [Repatha] 

Repatha 140 MG/ML Repatha 

140 MG/ML 2020 12:00:00 AM EDT        1.0 {ml}                      active

               Repatha 140 

MG/ML                                   eCW1 ([facility]

[facility])

 

                          1 ML evolocumab 140 MG/ML Prefilled Syringe [Repatha] 

Repatha 140 MG/ML Repatha 

140 MG/ML 2020 12:00:00 AM EDT        1.0 {ml}                      active

               Repatha 140 

MG/ML                                   eCW1 ([facility]

samuel)

 

                          1 ML evolocumab 140 MG/ML Prefilled Syringe [Repatha] 

Repatha 140 MG/ML Repatha 

140 MG/ML 2020 12:00:00 AM EDT        1.0 {ml}                      active

               Repatha 140 

MG/ML                                   eCW1 ([facility]

[facility])

 

                          1 ML evolocumab 140 MG/ML Prefilled Syringe [Repatha] 

Repatha 140 MG/ML Repatha 

140 MG/ML 2020 12:00:00 AM EDT        1.0 {ml}                      active

               Repatha 140 

MG/ML                                   eCW1 ([facility]

[facility])

 

                          1 ML evolocumab 140 MG/ML Prefilled Syringe [Repatha] 

Repatha 140 MG/ML Repatha 

140 MG/ML 2020 12:00:00 AM EDT        1.0 {ml}                      active

               Repatha 140 

MG/ML                                   eCW1 ([facility]

[facility])

 

                          1 ML evolocumab 140 MG/ML Prefilled Syringe [Repatha] 

Repatha 140 MG/ML Repatha 

140 MG/ML 2020 12:00:00 AM EDT        1.0 {ml}                      active

               Repatha 140 

MG/ML                                   eCW1 ([facility]

[facility])

 

                          1 ML evolocumab 140 MG/ML Prefilled Syringe [Repatha] 

Repatha 140 MG/ML Repatha 

140 MG/ML 2020 12:00:00 AM EDT        1.0 {ml}                      active

               Repatha 140 

MG/ML                                   eCW1 ([facility]

[facility])

 

                          1 ML evolocumab 140 MG/ML Prefilled Syringe [Repatha] 

Repatha 140 MG/ML Repatha 

140 MG/ML 2020 12:00:00 AM EDT        1.0 {ml}                      active

               Repatha 140 

MG/ML                                   eCW1 ([facility]

samuel)

 

                          1 ML evolocumab 140 MG/ML Prefilled Syringe [Repatha] 

Repatha 140 MG/ML Repatha 

140 MG/ML 2020 12:00:00 AM EDT        1.0 {ml}                      active

               Repatha 140 

MG/ML                                   eCW1 ([facility]

[facility])

 

                          1 ML evolocumab 140 MG/ML Prefilled Syringe [Repatha] 

Repatha 140 MG/ML Repatha 

140 MG/ML 2020 12:00:00 AM EDT        1.0 {ml}                      active

               Repatha 140 

MG/ML                                   eCW1 ([facility]

samuel)

 

                          1 ML evolocumab 140 MG/ML Prefilled Syringe [Repatha] 

Repatha 140 MG/ML Repatha 

140 MG/ML 2020 12:00:00 AM EDT        1.0 {ml}                      active

               Repatha 140 

MG/ML                                   eCW1 ([facility]

[facility])

 

                          1 ML evolocumab 140 MG/ML Prefilled Syringe [Repatha] 

Repatha 140 MG/ML Repatha 

140 MG/ML 2020 12:00:00 AM EDT        1.0 {ml}                      active

               Repatha 140 

MG/ML                                   eCW1 ([facility]

[facility])

 

                          1 ML evolocumab 140 MG/ML Prefilled Syringe [Repatha] 

Repatha 140 MG/ML Repatha 

140 MG/ML 2020 12:00:00 AM EDT        1.0 {ml}                      active

               Repatha 140 

MG/ML                                   eCW1 ([facility]

[facility])

 

                          1 ML evolocumab 140 MG/ML Prefilled Syringe [Repatha] 

Repatha 140 MG/ML Repatha 

140 MG/ML 2020 12:00:00 AM EDT        1.0 {ml}                      active

               Repatha 140 

MG/ML                                   eCW1 ([facility]

[facility])

 

                          1 ML evolocumab 140 MG/ML Prefilled Syringe [Repatha] 

Repatha 140 MG/ML Repatha 

140 MG/ML 2020 12:00:00 AM EDT        1.0 {ml}                      active

               Repatha 140 

MG/ML                                   eCW1 ([facility]

samuel)

 

                          1 ML evolocumab 140 MG/ML Prefilled Syringe [Repatha] 

Repatha 140 MG/ML Repatha 

140 MG/ML 2020 12:00:00 AM EDT        1.0 {ml}                      active

               Repatha 140 

MG/ML                                   eCW1 ([facility]

[facility])

 

                          1 ML evolocumab 140 MG/ML Prefilled Syringe [Repatha] 

Repatha 140 MG/ML Repatha 

140 MG/ML 2020 12:00:00 AM EDT        1.0 {ml}                      active

               Repatha 140 

MG/ML                                   eCW1 ([facility]

[facility])

 

                          1 ML evolocumab 140 MG/ML Prefilled Syringe [Repatha] 

Repatha 140 MG/ML Repatha 

140 MG/ML 2020 12:00:00 AM EDT        1.0 {ml}                      active

               Repatha 140 

MG/ML                                   eCW1 ([facility]

[facility])

 

                          1 ML evolocumab 140 MG/ML Prefilled Syringe [Repatha] 

Repatha 140 MG/ML Repatha 

140 MG/ML 2020 12:00:00 AM EDT        1.0 {ml}                      active

               Repatha 140 

MG/ML                                   eCW1 ([facility]

[facility])

 

                          1 ML evolocumab 140 MG/ML Prefilled Syringe [Repatha] 

Repatha 140 MG/ML Repatha 

140 MG/ML 2020 12:00:00 AM EDT        1.0 {ml}                      active

               Repatha 140 

MG/ML                                   eCW1 ([facility]

[facility])

 

                          1 ML evolocumab 140 MG/ML Prefilled Syringe [Repatha] 

Repatha 140 MG/ML Repatha 

140 MG/ML 2020 12:00:00 AM EDT        1.0 {ml}                      active

               Repatha 140 

MG/ML                                   eCW1 ([facility]

[facility])

 

                          1 ML evolocumab 140 MG/ML Prefilled Syringe [Repatha] 

Repatha 140 MG/ML Repatha 

140 MG/ML 2020 12:00:00 AM EDT        1.0 {ml}                      active

               Repatha 140 

MG/ML                                   eCW1 ([facility]

samuel)

 

                                        Acetaminophen 325 MG / Oxycodone Hydroch

loride 5 MG Oral Tablet [Percocet] 

Percocet 5-325 MG Percocet 5-325 MG 2020 12:00:00 AM EDT                 1

.0 

{tablet_as_needed}                         active                  Percocet 5-32

5 MG eCW1 ([facility])

 

                                        Acetaminophen 325 MG / Oxycodone Hydroch

loride 5 MG Oral Tablet [Percocet] 

Percocet 5-325 MG Percocet 5-325 MG 2020 12:00:00 AM EDT                 1

.0 

{tablet_as_needed}                         active                  Percocet 5-32

5 MG eCW1 ([facility])

 

                                        Acetaminophen 325 MG / Oxycodone Hydroch

loride 5 MG Oral Tablet [Percocet] 

Percocet 5-325 MG Percocet 5-325 MG 2020 12:00:00 AM EDT                 1

.0 

{tablet_as_needed}                         active                  Percocet 5-32

5 MG eCW1 ([facility])

 

                                        Acetaminophen 325 MG / Oxycodone Hydroch

loride 5 MG Oral Tablet [Percocet] 

Percocet 5-325 MG Percocet 5-325 MG 2020 12:00:00 AM EDT                 1

.0 

{tablet_as_needed}                         active                  Percocet 5-32

5 MG eCW1 ([facility])

 

                                        Acetaminophen 325 MG / Oxycodone Hydroch

loride 5 MG Oral Tablet [Percocet] 

Percocet 5-325 MG Percocet 5-325 MG 2020 12:00:00 AM EDT                 1

.0 

{tablet_as_needed}                         suspended                 Percocet 5-

325 MG eCW1 ([facility])

 

                                        Acetaminophen 325 MG / Oxycodone Hydroch

loride 5 MG Oral Tablet [Percocet] 

Percocet 5-325 MG Percocet 5-325 MG 2020 12:00:00 AM EDT                 1

.0 

{tablet_as_needed}                         active                  Percocet 5-32

5 MG eCW1 ([facility])

 

                                        Acetaminophen 325 MG / Oxycodone Hydroch

loride 5 MG Oral Tablet [Percocet] 

Percocet 5-325 MG Percocet 5-325 MG 2020 12:00:00 AM EDT                 1

.0 

{tablet_as_needed}                         active                  Percocet 5-32

5 MG eCW1 ([facility])

 

                                        12 HR Amoxicillin 1000 MG / Clavulanate 

62.5 MG Extended Release Oral Tablet 

Amoxicillin-Pot Clavulanate ER 1000-62.5 MG Amoxicillin-Pot Clavulanate ER 1000-

62.5 MG 2020 12:00:00 AM EDT        2.0 {tablet}                      acti

ve               Amoxicillin-

Pot Clavulanate ER 1000-62.5 MG         eCW1 ([facility]

samuel)

 

                                        Acetaminophen 325 MG / Oxycodone Hydroch

loride 5 MG Oral Tablet [Percocet] 

Percocet 5-325 MG Percocet 5-325 MG 2020 12:00:00 AM EDT                 1

.0 

{tablet_as_needed}                         active                  Percocet 5-32

5 MG eCW1 ([facility])

 

                                        Acetaminophen 325 MG / Oxycodone Hydroch

loride 5 MG Oral Tablet [Percocet] 

Percocet 5-325 MG Percocet 5-325 MG 2020 12:00:00 AM EDT                 1

.0 

{tablet_as_needed}                         active                  Percocet 5-32

5 MG eCW1 ([facility])

 

                                        Acetaminophen 325 MG / Oxycodone Hydroch

loride 5 MG Oral Tablet [Percocet] 

Percocet 5-325 MG Percocet 5-325 MG 2020 12:00:00 AM EDT                 1

.0 

{tablet_as_needed}                         active                  Percocet 5-32

5 MG eCW1 ([facility])

 

                                        Acetaminophen 325 MG / Oxycodone Hydroch

loride 5 MG Oral Tablet [Percocet] 

Percocet 5-325 MG Percocet 5-325 MG 2020 12:00:00 AM EDT                 1

.0 

{tablet_as_needed}                         active                  Percocet 5-32

5 MG eCW1 ([facility])

 

                                        Acetaminophen 325 MG / Oxycodone Hydroch

loride 5 MG Oral Tablet [Percocet] 

Percocet 5-325 MG Percocet 5-325 MG 2020 12:00:00 AM EDT                 1

.0 

{tablet_as_needed}                         active                  Percocet 5-32

5 MG eCW1 ([facility])

 

                                        Acetaminophen 325 MG / Oxycodone Hydroch

loride 5 MG Oral Tablet [Percocet] 

Percocet 5-325 MG Percocet 5-325 MG 2020 12:00:00 AM EDT                 1

.0 

{tablet_as_needed}                         active                  Percocet 5-32

5 MG eCW1 ([facility])

 

                                        Acetaminophen 325 MG / Oxycodone Hydroch

loride 5 MG Oral Tablet [Percocet] 

Percocet 5-325 MG Percocet 5-325 MG 2020 12:00:00 AM EDT                 1

.0 

{tablet_as_needed}                         suspended                 Percocet 5-

325 MG eCW1 ([facility])

 

                                        Acetaminophen 325 MG / Oxycodone Hydroch

loride 5 MG Oral Tablet [Percocet] 

Percocet 5-325 MG Percocet 5-325 MG 2020 12:00:00 AM EDT                 1

.0 

{tablet_as_needed}                         suspended                 Percocet 5-

325 MG eCW1 ([facility])

 

                                        Acetaminophen 325 MG / Oxycodone Hydroch

loride 5 MG Oral Tablet [Percocet] 

Percocet 5-325 MG Percocet 5-325 MG 2020 12:00:00 AM EDT                 1

.0 

{tablet_as_needed}                         active                  Percocet 5-32

5 MG eCW1 ([facility])

 

                                        Acetaminophen 325 MG / Oxycodone Hydroch

loride 5 MG Oral Tablet [Percocet] 

Percocet 5-325 MG Percocet 5-325 MG 2020 12:00:00 AM EDT                 1

.0 

{tablet_as_needed}                         active                  Percocet 5-32

5 MG eCW1 ([facility])

 

                                        Acetaminophen 325 MG / Oxycodone Hydroch

loride 5 MG Oral Tablet [Percocet] 

Percocet 5-325 MG Percocet 5-325 MG 2020 12:00:00 AM EDT                 1

.0 

{tablet_as_needed}                         active                  Percocet 5-32

5 MG eCW1 ([facility])

 

                                        Acetaminophen 325 MG / Oxycodone Hydroch

loride 5 MG Oral Tablet [Percocet] 

Percocet 5-325 MG Percocet 5-325 MG 2020 12:00:00 AM EDT                 1

.0 

{tablet_as_needed}                         active                  Percocet 5-32

5 MG eCW1 ([facility])

 

                                        Acetaminophen 325 MG / Oxycodone Hydroch

loride 5 MG Oral Tablet [Percocet] 

Percocet 5-325 MG Percocet 5-325 MG 2020 12:00:00 AM EDT                 1

.0 

{tablet_as_needed}                         active                  Percocet 5-32

5 MG eCW1 ([facility])

 

                                        12 HR Amoxicillin 1000 MG / Clavulanate 

62.5 MG Extended Release Oral Tablet 

Amoxicillin-Pot Clavulanate ER 1000-62.5 MG Amoxicillin-Pot Clavulanate ER 1000-

62.5 MG 2020 12:00:00 AM EDT        2.0 {tablet}                      acti

ve               Amoxicillin-

Pot Clavulanate ER 1000-62.5 MG         eCW1 ([facility]

samuel)

 

                                        Acetaminophen 325 MG / Oxycodone Hydroch

loride 5 MG Oral Tablet [Percocet] 

Percocet 5-325 MG Percocet 5-325 MG 2020 12:00:00 AM EDT                 1

.0 

{tablet_as_needed}                         active                  Percocet 5-32

5 MG eCW1 ([facility])

 

                                        Acetaminophen 325 MG / Oxycodone Hydroch

loride 5 MG Oral Tablet [Percocet] 

Percocet 5-325 MG Percocet 5-325 MG 2020 12:00:00 AM EDT                 1

.0 

{tablet_as_needed}                         active                  Percocet 5-32

5 MG eCW1 ([facility])

 

                                        Acetaminophen 325 MG / Oxycodone Hydroch

loride 5 MG Oral Tablet [Percocet] 

Percocet 5-325 MG Percocet 5-325 MG 2020 12:00:00 AM EDT                 1

.0 

{tablet_as_needed}                         active                  Percocet 5-32

5 MG eCW1 ([facility])

 

                                        12 HR Amoxicillin 1000 MG / Clavulanate 

62.5 MG Extended Release Oral Tablet 

Amoxicillin-Pot Clavulanate ER 1000-62.5 MG Amoxicillin-Pot Clavulanate ER 1000-

62.5 MG 2020 12:00:00 AM EDT        2.0 {tablet}                      acti

ve               Amoxicillin-

Pot Clavulanate ER 1000-62.5 MG         eCW1 ([facility]

samuel)

 

                                        Acetaminophen 325 MG / Oxycodone Hydroch

loride 5 MG Oral Tablet [Percocet] 

Percocet 5-325 MG Percocet 5-325 MG 2020 12:00:00 AM EDT                 1

.0 

{tablet_as_needed}                         active                  Percocet 5-32

5 MG eCW1 ([facility])

 

                                        Acetaminophen 325 MG / Oxycodone Hydroch

loride 5 MG Oral Tablet [Percocet] 

Percocet 5-325 MG Percocet 5-325 MG 2020 12:00:00 AM EDT                 1

.0 

{tablet_as_needed}                         active                  Percocet 5-32

5 MG eCW1 ([facility])

 

                                        Acetaminophen 325 MG / Oxycodone Hydroch

loride 5 MG Oral Tablet [Percocet] 

Percocet 5-325 MG Percocet 5-325 MG 2020 12:00:00 AM EDT                 1

.0 

{tablet_as_needed}                         active                  Percocet 5-32

5 MG eCW1 ([facility])

 

                                        Acetaminophen 325 MG / Oxycodone Hydroch

loride 5 MG Oral Tablet [Percocet] 

Percocet 5-325 MG Percocet 5-325 MG 2020 12:00:00 AM EDT                 1

.0 

{tablet_as_needed}                         active                  Percocet 5-32

5 MG eCW1 ([facility])

 

                                        Acetaminophen 325 MG / Oxycodone Hydroch

loride 5 MG Oral Tablet [Percocet] 

Percocet 5-325 MG Percocet 5-325 MG 2020 12:00:00 AM EDT                 1

.0 

{tablet_as_needed}                         active                  Percocet 5-32

5 MG eCW1 ([facility])

 

                                        Acetaminophen 325 MG / Oxycodone Hydroch

loride 5 MG Oral Tablet [Percocet] 

Percocet 5-325 MG Percocet 5-325 MG 2020 12:00:00 AM EDT                 1

.0 

{tablet_as_needed}                         suspended                 Percocet 5-

325 MG eCW1 ([facility])

 

                                        Acetaminophen 325 MG / Oxycodone Hydroch

loride 5 MG Oral Tablet [Percocet] 

Percocet 5-325 MG Percocet 5-325 MG 2020 12:00:00 AM EDT                 1

.0 

{tablet_as_needed}                         active                  Percocet 5-32

5 MG eCW1 ([facility])

 

                                        Acetaminophen 325 MG / Oxycodone Hydroch

loride 5 MG Oral Tablet [Percocet] 

Percocet 5-325 MG Percocet 5-325 MG 2020 12:00:00 AM EDT                 1

.0 

{tablet_as_needed}                         active                  Percocet 5-32

5 MG eCW1 ([facility])

 

                                        Acetaminophen 325 MG / Oxycodone Hydroch

loride 5 MG Oral Tablet [Percocet] 

Percocet 5-325 MG Percocet 5-325 MG 2020 12:00:00 AM EDT                 1

.0 

{tablet_as_needed}                         suspended                 Percocet 5-

325 MG eCW1 ([facility])

 

                                        Acetaminophen 325 MG / Oxycodone Hydroch

loride 5 MG Oral Tablet [Percocet] 

Percocet 5-325 MG Percocet 5-325 MG 2020 12:00:00 AM EDT                 1

.0 

{tablet_as_needed}                         active                  Percocet 5-32

5 MG eCW1 ([facility])

 

                                        12 HR Amoxicillin 1000 MG / Clavulanate 

62.5 MG Extended Release Oral Tablet 

Amoxicillin-Pot Clavulanate ER 1000-62.5 MG Amoxicillin-Pot Clavulanate ER 1000-

62.5 MG 2020 12:00:00 AM EDT        2.0 {tablet}                      acti

ve               Amoxicillin-

Pot Clavulanate ER 1000-62.5 MG         eCW1 ([facility]

samuel)

 

                                        12 HR Amoxicillin 1000 MG / Clavulanate 

62.5 MG Extended Release Oral Tablet 

Amoxicillin-Pot Clavulanate ER 1000-62.5 MG Amoxicillin-Pot Clavulanate ER 1000-

62.5 MG 2020 12:00:00 AM EDT        2.0 {tablet}                      acti

ve               Amoxicillin-

Pot Clavulanate ER 1000-62.5 MG         eCW1 ([facility]

samuel)

 

                                        Acetaminophen 325 MG / Oxycodone Hydroch

loride 5 MG Oral Tablet [Percocet] 

Percocet 5-325 MG Percocet 5-325 MG 2020 12:00:00 AM EDT                 1

.0 

{tablet_as_needed}                         active                  Percocet 5-32

5 MG eCW1 ([facility])

 

                                        Acetaminophen 325 MG / Oxycodone Hydroch

loride 5 MG Oral Tablet [Percocet] 

Percocet 5-325 MG Percocet 5-325 MG 2020 12:00:00 AM EDT                 1

.0 

{tablet_as_needed}                         active                  Percocet 5-32

5 MG eCW1 ([facility])

 

                                        Acetaminophen 325 MG / Oxycodone Hydroch

loride 5 MG Oral Tablet [Percocet] 

Percocet 5-325 MG Percocet 5-325 MG 2020 12:00:00 AM EDT                 1

.0 

{tablet_as_needed}                         active                  Percocet 5-32

5 MG eCW1 ([facility])

 

                                        Acetaminophen 325 MG / Oxycodone Hydroch

loride 5 MG Oral Tablet [Percocet] 

Percocet 5-325 MG Percocet 5-325 MG 2020 12:00:00 AM EDT                 1

.0 

{tablet_as_needed}                         active                  Percocet 5-32

5 MG eCW1 ([facility])

 

                                        Acetaminophen 325 MG / Oxycodone Hydroch

loride 5 MG Oral Tablet [Percocet] 

Percocet 5-325 MG Percocet 5-325 MG 2020 12:00:00 AM EDT                 1

.0 

{tablet_as_needed}                         suspended                 Percocet 5-

325 MG eCW1 ([facility])

 

                                        Acetaminophen 325 MG / Oxycodone Hydroch

loride 5 MG Oral Tablet [Percocet] 

Percocet 5-325 MG Percocet 5-325 MG 2020 12:00:00 AM EDT                 1

.0 

{tablet_as_needed}                         suspended                 Percocet 5-

325 MG eCW1 ([facility])

 

                    Hydrochlorothiazide 12.5 MG Oral Tablet Hydrochlorothiazide 

2014 10:17:00 

PM EDT        12.5 MG                      completed                      [facility]

 

             Losartan Potassium 50 MG Oral Tablet Losartan     2014 10:17:

00 PM EDT              50 MG

                                completed                         [facility]

 

                                        60 ACTUAT Fluticasone propionate 0.1 MG/

ACTUAT / salmeterol 0.05 MG/ACTUAT Dry 

Powder Inhaler Fluticasone Propion-Salmeterol (Advair 100-50 Diskus) 1 EACH 
blister with device                     Fluticasone Propion-Salmeterol (Advair 1

00-50 Diskus) 1 EACH

 blister with device 2014 10:17:00 PM EDT       1 PUFFS                   

completed                   

[facility]

 

                          Rosuvastatin calcium 20 MG Oral Tablet Rosuvastatin (C

restor) 20 MG tablet 

Rosuvastatin (Crestor) 20 MG tablet 2014 10:17:00 PM EDT           20 MG  

                                 

completed                                                       [facility]

 

                    Promethazine Hydrochloride 25 MG Oral Tablet Promethazine   

     2014 10:17:00 PM

 EDT          25 MG                       completed                      [facility]

 

                gabapentin 300 MG Oral Capsule Gabapentin Gabapentin      2014 10:17:00 PM EDT 

        300 MG                          completed                         [facility]

 

        Sucralfate 1000 MG Oral Tablet Sucralfate 2014 10:17:00 PM EDT    

     1 G                      

             completed                                           [facility]

 

                          ezetimibe 10 MG Oral Tablet Ezetimibe (Zetia) 10 MG ta

blet Ezetimibe (Zetia) 10 

MG tablet 2014 10:17:00 PM EDT       10 MG                   completed    

               [facility]

 

                          Alprazolam 0.25 MG Oral Tablet Alprazolam (Xanax) 0.25

 MG tablet Alprazolam 

(Xanax) 0.25 MG tablet 2014 10:17:00 PM EDT        0.25 MG                

      completed               

                                        [facility]



                                                                                
                                                                                
                                                                                
                                                                                
                                                                                
                                                                                
                                                                                
                                                                                
                                                                                
                                                                                
                                                                                
                                                                                
                                                                                
                                                                                
                                                                                
                                                                                
                                                                                
                                                                                
                                                                                
                                                                                
                                                                                
                                                                                
                                                                                
                                                                                
                                                                                
                                                                                
        



Insurance Providers

          



             Payer name   Policy type / Coverage type Policy ID    Covered party

 ID Covered 

party's relationship to villanueva Policy [place]             Plan Information

 

          MEDICARE            [id number]  xxxxxxxxxxx                     [id number]

 

          [employer]           [id number]           S            

       [id number]

 

          [place]           [id number]           S                  

 [id number]

 

          MEDICARE            [id number]           Priti                 [id number]

[id number]

 

          MEDICARE            [id number]           Priti                 [id number]

A

 

          [employer]           [id number]           S           

        [id number]

 

          [place]           [id number]           S                 

  [id number]

 

          MEDICARE  M         [id number]           S                   [id number]

A

 

          MEDICARE            [id number]           SP                  [id number]

[id number]

 

          MEDICARE  A         [id number]           Self                [id number]

A

 

          Medicare Part B [facility]           [id number]           0     

              [id number]

 

                Medicare Part B Medicare Primary [id number]     

[id number] Self                                    

[id number]

 

                [employer]                 

[id number] Self                                     

 

           Medicare [facility] Medicare Primary            [id number]

[id number] Self       

                                         

 

          Medicaid [place] Medigap Part B           [id number]

935.0 Self                 

 

          BC MEDICARE 11        [id number]           1                   [id number]

[id number]

 

          [facility]                 [id number]           Priti                 [id number]

[id number]

 

          [employer] OPTIONS           [id number]           SP            

      [id number]

 

          [employer] OPTIONS           [id number]           SP           

       [id number]

 

          [facility]                 [id number]           Priti                 [id number]

1

 

                [employer] Medigap Part B  [id number]     

[id number] Self                                    

[id number]

 

          [facility]                 [id number]           Priti                 [id number]

011

 

          [facility]                 [id number]  xxxxxxxxxxx                     [id number]

 

          [facility]                 [id number]           Priti                 [id number]

 

          [employer]           [id number]           0            

       [id number]

 

          [employer]      U         [id number]           Self                [id number]

011

 

          [employer] OPTIONS           [id number]                      

       [id number]

 

                    AN[place]-Medicare Part [place] [id number]   

                            

[id number]

 

                    ANSI-Commercial [id number]        

                       

[id number]

 

                    ANSI-Commercial [id number]        

                       

[id number]

 

                    ANSI-Medicare Part B [id number]   

                            

[id number]

 

                    ANSI-Medicare Part B [id number]   

                            

[id number]

 

                    ANSI-Commercial [id number]        

                       

[id number]

 

                    ANSI-Commercial [id number]        

                       

[id number]

 

                    ANSI-Medicare Part B [id number]   

                            

[id number]

 

                    ANSI-Commercial [id number]        

                       

[id number]

 

                    ANSI-Medicare Part B [id number]   

                            

[id number]

 

                    ANSI-Medicare Part B [id number]   

                            

[id number]

 

                    ANSI-Commercial [id number]        

                       

[id number]

 

                    ANSI-Medicare Part B [id number]   

                            

[id number]

 

                    ANSI-Commercial [id number]        

                       

[id number]

 

                    ANSI-Medicare Part B [id number]   

                            

[id number]

 

                    ANSI-Commercial [id number]        

                       

[id number]

 

                    ANSI-Medicare Part B [id number]   

                            

[id number]

 

                    ANSI-Commercial [id number]        

                       

[id number]

 

                    ANSI-Commercial [id number]        

                       

[id number]

 

                    ANSI-Medicare Part B [id number]   

                            

[id number]

 

                    ANSI-Medicare Part B [id number]   

                            

[id number]

 

                    ANSI-Commercial [id number]        

                       

[id number]

 

                    ANSI-Commercial [id number]        

                       

[id number]

 

                    ANSI-Medicare Part B [id number]   

                            

[id number]

 

                    ANSI-Commercial [id number]        

                       

[id number]

 

                    ANSI-Medicare Part B [id number]   

                            

[id number]

 

                    ANSI-Medicare Part B [id number]   

                            

[id number]

 

                    ANSI-Commercial [id number]        

                       

[id number]

 

                    ANSI-Medicare Part B [id number]   

                            

[id number]

 

                    ANSI-Commercial [id number]        

                       

[id number]

 

                    ANSI-Medicare Part B [id number]   

                            

[id number]

 

                    ANSI-Commercial [id number]-[id number]        

                       

[id number]

 

                    ANSI-Medicare Part B [id number]   

                            

[id number]

 

                    ANSI-Commercial [id number]        

                       

[id number]

 

                    ANSI-Medicare Part B [id number]   

                            

[id number]

 

                    ANSI-Commercial [id number]        

                       

[id number]

 

                    ANSI-Commercial [id number]        

                       

[id number]

 

                    ANSI-Medicare Part B [id number]   

                            

[id number]

 

                    AN[employer]-Commercial [id number]        

                       

[id number]

 

                    ANSI-Medicare Part B [id number]   

                            

[id number]

 

                    ANSI-Medicare Part B [id number]   

                            

[id number]

 

                    AN[employer]-Commercial 9bj60i4a-5non-[id number]-404y5839ia66        

                       

[id number]

 

                    ANSI-Medicare Part B [id number]   

                            

[id number]

 

                    AN[employer]-Commercial [id number]-[id number]        

                       

[id number]

 

                    ANSI-Medicare Part B [id number]   

                            

[id number]

 

                    ANSI-Commercial 4ce242p2-1p1i-970y-378b-17158kz99u1g        

                       

[id number]-139p-70844le50q2d

 

                    ANSI-Commercial zz6q06ta-k264-2q80-5z68-2906v6o936k6        

                       

nr4h12os-[id number]

 

                    ANSI-Medicare Part B [id number]   

                            

[id number]

 

           [facility] Part B [id number] ..[id number]

6.270207.0 Self        

                                        [id number]

 

                Medicare [facility] Medicare Primary [id number]     

..[id number] Self                                   

 [id number]

 

                    AN[employer]-Commercial 2wzx9xw0-[id number]-sct6-w69960852w4s        

                       

[id number]-[id number]-n92373487c6p

 

                    ANSI-Medicare Part B [id number]   

                            

[id number]

 

                    ANSI-Medicare Part B [id number]   

                            

[id number]

 

                    [employer]-Commercial 2s99qn94-8814-9233-u915-yc5167u2v066        

                       

[id number]

 

                    ANSI-Medicare Part B [id number]   

                            

[id number]

 

                    AN[employer]-Commercial 5u20yb25-38nw-02st-0448-0053t9g0429b        

                       

[id number]-99wz-[id number]

 

                    ANSI-Medicare Part B [id number]   

                            

[id number]

 

                    [employer]-Commercial [id number]-z3oz-s61850229820        

                       

[id number]-o7dv-[id number]

 

                    AN[employer]-Commercial 6lv85938-c126-3o0l-q932-5he49e3f7u03        

                       

7vv49021-[id number]-[id number]

 

                    ANSI-Medicare Part B [id number]   

                            

[id number]

 

                    ANSI-Medicare Part B [id number]   

                            

[id number]

 

                    AN[employer]-Commercial 4x1z67i7-2j5w-8149-01nc-6c90t4582587        

                       

[id number]

 

                    AN[employer]-Commercial 74e2043o-cn3s-4xf9-d395-z6s475c11ys0        

                       

30e1376v-[id number]-q587-o6y125s53sg3

 

                    ANSI-Medicare Part B [id number]   

                            

[id number]

 

                    AN[employer]-Commercial [id number]-v099-r86092bc3kdr        

                       

[id number]-v107-d42737wi1diy

 

                    ANSI-Medicare Part B [id number]   

                            

[id number]

 

                    AN[employer]-Commercial 42pkq5qs-1y23-1t6v-a6x4-9111w509hk52        

                       

40wse6zr-[id number]-[id number]

 

                    ANSI-Medicare Part B [id number]   

                            

[id number]-[id number]

 

          MEDICARE            [id number]           SP                  [id number]

A

 

          MEDICARE C         [id number] S                   [id number]

A

 

          [facility]                 PI                                      PI

 

          MEDICARE            PI                                      PI

 

          MEDICARE PART A             -O/P           [id number]           18    

              [id number]

 

          AARP HEALTH CARE OPTIONS-O/P           [id number]           18       

           [id number]

 

          AARP HEALTH CARE OPTIONS           [id number]           18           

       [id number]

 

          MEDICARE PART A [facility]        [id number]           18                  

[id number]

 

                [employer] Options Medigap Part B  [id number]     

2.0.1.081095.3.227.99.510.2647.0 Self                                    33

[id number]

 

                Medicare Part A [place] Medicare Primary [id number]      

2.0.1.644636.3.227.99.510.2647.0 Self                                    08

[id number]

 

                [employer] Medigap Part B  [id number]     

2.0.1.067488.3.227.99.510.2647.0 Self                                    33

[id number]

 

                Medicare Part A [place] Medicare [place] [id number]      

2.16840.1.725929.3.227.99.510.2647.0 Self                                    08

[id number]

 

                [employer] Medigap Part B  [id number]     

2.840.1.997620.3.227.99.510.2647.0 Self                                    33

[id number]

 

                Medicare Part A [place] Medicare Primary [id number]      

2.840.1.972104.3.227.99.510.2647.0 Self                                    08

[id number]

 

                [employer] Supplemental Plan Medigap Part B  [id number]     

2.840.1.395060.3.227.99.802.664239.0 Self                                    

[id number]

 

             Medicare     Medicare Primary [id number]   2.840.1.379593.3.227.

99.802.700421.0 

Self                                                [id number]

 

                [employer] Medigap Part B  [id number]     

840.1.397896.3.227.99.1767.71086.0 Self                                    

[id number]

 

                Medicare [employer] Medicare Primary [id number]      

840.1.466978.3.227.99.1767.35622.0 Self                                    

[id number]

 

                [employer] Supplemental Plan Medigap Part B  [id number]     

2840.1.761718.3.227.99.802.257896.0 Self                                    

[id number]

 

             Medicare     Medicare Primary [id number]   2840.1.750108.3.227.

99.802.825895.0 

Self                                                [id number]

 

                [employer] Supplemental Plan Medigap Part B  [id number]     

2840.1.050119.3.227.99.802.856816.0 Self                                    

[id number]

 

             Medicare     Medicare Primary [id number]   2840.1.329296.3.227.

99.802.786350.0 

Self                                                [id number]

 

                [facility] Aarp Medigap Part B  [id number]     

2.840.1.149898.3.227.99.9487.70081.0 Self                                    

[id number]

 

                Medicare Part B Medicare Primary [id number]      

2.16840.1.389195.3.227.99.9487.51427.0 Self                                    

[id number]

 

          [employer] Supplemental Plan Medigap Part B           [id number]    Self        

         

 

          Medicare  Medicare Primary           [id number]    Self                 

 

             [employer] Options Medigap Part B              2.0.1.1138

83.3.227.99.991.72999.0 

Self                                                 

 

             Medicare [employer] Medigap Part B              2.0.1.038233

.3.227.99.991.73880.0 

Self                                                 

 

          AARP HEALTH CARE OPTIONS-O/P           [id number]           18         

         [id number]

 

          [employer] Options Medigap Part B           79614     Self      

           

 

          [place]are [place]           [id number]     Self            

     

 

          AARP      O         [id number] S                   [id number]

[id number]

 

          [employer] [facility] Medigap Part B           [id number]    Self              

   

 

          AARP HEALTH CARE OPTIONS    -O/P           [id number]           18   

               [id number]

 

          MEDICARE                    -O/P           [id number]           18    

              [id number]

 

          AARP HEALTH CARE O         [id number]           S                   3

[id number]

 

          TODAYS OPTIONS O         [id number]           S                   [id number]

[id number]

 

          MEDICARE                    -O/P           [id number]           18    

              [id number]

 

          MEDICARE PART B             -PHYSICIAN           [id number]           

18                  [id number]

 

          AARP HEALTH CARE OPTIONS    -PHYSICIAN           [id number]         

  18                  [id number]

 

          AARP HEALTH CARE OPTIONS-CLINIC           [id number]           18   

               [id number]

 

          MEDICARE PART A-CLINIC           [id number]           18              

    [id number]

 

          SELF PAY  2         UNAVAILABLE           1                   UNAVAILA

BLE

 

          NF MISCELLANEOUS           [id number]             PT                  110

[id number]

 

          [employer]ARP HE[employer] CARE OPTIONS           [id number]           PT           

       [id number]

 

          MEDICARE PART B           [id number]           PT                  089

[id number]

 

          MEDICARE PART A           [id number]           PT                  089

[id number]

 

          MEDICARE            [id number]           SP                  [id number]

[id number]

 

                              [id number]                               ZCT0

[id number]

 

          [employer]ARP HE[employer] CARE OPTIONS           [id number]           SP           

       [id number]

 

          [place]STATE MEDICARE DIVISION           [id number]           S           

        [id number]

 

          [place]ICARE [place]           [id number]           S                 

  [id number]

 

          AARP HEALTH CARE OPTIONS           [id number]           S            

       [id number]

 

          AARP HEALTH CARE OPTIONS           [id number]           S            

       [id number]

 

          AARP HE[employer] CARE OPTIONS           [id number]           S             

      [id number]

 

          [employer]ARP HE[employer] CARE OPTIONS           [id number]           S            

       [id number]

 

          UPSTATE MEDICARE DIVISION           [id number]           S           

        [id number]

 

          [place]ARE [place]           [id number]           S                 

  [id number]

 

          UPSTATE MEDICARE DIVISION           [id number]           S            

       [id number]

 

          [place]ICARE [place]           [id number]           S                  

 [id number]

 

          AARP HEALTH CARE OPTIONS           [id number]           S            

       [id number]

 

          MEDICARE C         [id number] S                   [id number]

G75

 

          AARP      O         [id number] S                   [id number]

611

 

                    ANSI-Medicare Part B [id number]   

                            

[id number]

 

                    ANSI-Commercial [id number]        

                       

[id number]

 

                    ANSI-Medicare Part B [id number]   

                            

[id number]

 

                    ANSI-Commercial [id number]        

                       

[id number]

 

                    ANSI-Commercial [id number]        

                       

[id number]

 

                    ANSI-Medicare Part B [id number]   

                            

[id number]

 

                    ANSI-Medicare Part B [id number]   

                            

[id number]

 

                    [facility] [id number]        

                       

[id number]

 

                    [facility] [id number]        

                       

[id number]

 

                    ANSI-Medicare Part B [id number]   

                            

[id number]

 

                    [facility] [id number]        

                       

[id number]

 

                    ANSI-Medicare Part B [id number]   

                            

[id number]



                                                                                
                                                                                
                                                                                
                                                                                
                                                                                
                                                                                
                                                                                
                                                                                
                                                                                
                                                                                
                                                                                
                                                                                
                                                                                
                                                                                
                                                                                
                                                                                
                                                                                
                                                                                
                                                                                
                                                                                
                                                                                
                                                                                
                  



Problems, Conditions, and Diagnoses

          



           Code       Display Name Description Problem Type Effective Dates Data

 Source(s)

 

                    Z87.891             Personal history of nicotine dependence 

PERSONAL HISTORY OF NICOTINE 

DEPENDENCE          Diagnosis           2021 03:40:00 PM [facility]

 

                    Z79.899             Other long term (current) drug therapy O

THER LONG TERM (CURRENT) DRUG 

THERAPY             Diagnosis           2021 03:40:00 PM [facility]

 

                    Z79.891             Long term (current) use of opiate analge

sic LONG TERM (CURRENT) USE OF 

OPIATE ANALGESIC    Diagnosis           2021 03:40:00 PM [facility]

 

                Z79.82          Long term (current) use of aspirin LONG TERM (CU

RRENT) USE OF ASPIRIN 

Diagnosis                 2021 03:40:00 PM [facility]

 

                    Z79.02              Long term (current) use of antithromboti

cs/antiplatelets LONG TERM 

(CURRENT) USE OF ANTITHROMBOTICS/ANTIPLA Diagnosis           2021 03:40:00

 PM [facility]

 

                    Z95.1               Presence of aortocoronary bypass graft P

RESENCE OF AORTOCORONARY BYPASS 

GRAFT               Diagnosis           2021 03:40:00 [facility]

l

 

                    E11.9               Type 2 diabetes mellitus without complic

ations TYPE 2 DIABETES MELLITUS 

WITHOUT COMPLICATIONS Diagnosis           2021 03:40:00 PM [facility]

pallavi

 

             I25.2        Old myocardial infarction OLD MYOCARDIAL INFARCTION Di

agnosis    2021 

03:40:00 PM [facility]

 

             I10          Essential (primary) hypertension ESSENTIAL (PRIMARY) H

YPERTENSION Diagnosis    

2021 03:40:00 PM [facility]

 

           R07.2      Precordial pain PRECORDIAL PAIN Diagnosis  2021 03:4

0:00 PM [facility]

 

                    Z87.898             Personal history of other specified cond

itions PERSONAL HISTORY OF OTHER

 SPECIFIED CONDITIONS Diagnosis           2021 12:55:00 PM [facility]

 

           R07.89     Other chest pain OTHER CHEST PAIN Diagnosis  2021 12

:55:00 PM [facility]

 

             R06.02       Shortness of breath SHORTNESS OF BREATH Diagnosis    0

3/23/2021 12:55:00 PM 

[facility]

 

                    C64.2               Malignant neoplasm of left kidney, excep

t renal pelvis MALIGNANT NEOPLASM 

OF LEFT KIDNEY, EXCEPT RENAL PE Diagnosis           2020 01:00:00 PM [facility]

 

                    K21.9               Gastro-esophageal reflux disease without

 esophagitis GASTRO-ESOPHAGEAL 

REFLUX DISEASE WITHOUT ESOPHAGIT Diagnosis           2020 01:00:00 PM [facility]

 

                    E53.8               Deficiency of other specified B group vi

tamins DEFICIENCY OF OTHER 

SPECIFIED B GROUP VITAMINS Diagnosis           2020 01:00:00 PM [facility]

 

             E78.5        Hyperlipidemia, unspecified HYPERLIPIDEMIA, UNSPECIFIE

D Diagnosis    

2020 01:00:00 PM [facility]

 

             Z71.89       Other specified counseling OTHER SPECIFIED COUNSELING 

Diagnosis    

10/14/2020 10:30:00 AM [facility]

 

             Z23          Encounter for immunization ENCOUNTER FOR IMMUNIZATION 

Diagnosis    10/14/2020 

10:30:00 AM [facility]

 

                    D51.0               Vitamin B12 deficiency anemia due to int

rinsic factor deficiency VITAMIN 

B12 DEFIC ANEMIA DUE TO INTRINSIC FACTOR DEFICIENCY Diagnosis                 10

/ 

10:30:00 AM [facility]

 

                    Z01.30              Encounter for examination of blood press

ure without abnormal findings 

ENCOUNTER FOR EXAM OF BLOOD PRESSURE W/O ABNORMAL Diagnosis                  11:20:00 

AM [facility]

 

             B37.3        Candidiasis of vulva and vagina CANDIDIASIS OF VULVA A

ND VAGINA Diagnosis    

2020 11:30:00 AM [facility]

 

                    Z12.11              Encounter for screening for malignant ne

oplasm of colon ENCOUNTER FOR 

SCREENING FOR MALIGNANT NEOPLASM OF COLON Diagnosis           2020 11:00:0

0 AM [facility]

 

                    Z87.19              Personal history of other diseases of th

e digestive system PERSONAL 

HISTORY OF OTHER DISEASES OF THE DIGESTIV Diagnosis           2020 11:30:0

0 AM [facility]

 

                    Z98.0               Intestinal bypass and anastomosis status

 INTESTINAL BYPASS AND ANASTOMOSIS

 STATUS             Diagnosis           2020 11:30:00 AM [facility]

l

 

                    K52.9               Noninfective gastroenteritis and colitis

, unspecified NONINFECTIVE 

GASTROENTERITIS AND COLITIS, UNSPECIF Diagnosis           2020 11:30:00 AM

 [facility]

 

                          K57.92                    Diverticulitis of intestine,

 part unspecified, without perforation or 

abscess without bleeding  DVTRCLI OF INTEST, PART UNSP, W/O PERF OR ABSCESS 

Diagnosis                 2020 11:30:00 AM [facility]

 

             R10.32       Left lower quadrant pain LEFT LOWER QUADRANT PAIN Diag

nosis    2020 

11:30:00 AM [facility]

 

           G89.29     [id number]   Other chronic pain Problem    2021 12:00:

00 AM EDT [facility] 

([facility])

 

           E78.00     [id number]   Hypercholesterolemia Problem    2021 12:0

0:00 AM EDT [facility] 

([facility])

 

             Z86.39       [id number]    History of non anemic vitamin B12 deficie

ncy Problem      2021

 12:00:00 AM EDT                        [facility] ([facility])

 

             R42          Dizziness and giddiness Dizziness and giddiness Proble

m      06/15/2021 12:00:00 

AM EDT                                  [facility] ([facility])

 

             R55          Syncope and collapse Syncope and collapse Problem     

 06/15/2021 12:00:00 AM EDT

                                        [facility] ([facility])

 

           R00.2      Palpitations Palpitations Problem    06/15/2021 12:00:00 A

M EDT [facility] 

([facility])

 

             I25.2        Old myocardial infarction Old myocardial infarction Pr

oblem      06/15/2021 

12:00:00 AM EDT                         [facility] ([facility])

 

                    I45.2               Right bundle branch block AND left anter

ior fascicular block Right bundle 

branch block AND left anterior fascicular block Problem                   06/15/

2021 12:00:00 AM 

EDT                                     [facility] ([facility])

 

             R94.31       Electrocardiogram abnormal Electrocardiogram abnormal 

Problem      06/15/2021 

12:00:00 AM EDT                         [facility] ([facility])

 

           E66.3      Overweight Overweight Problem    06/15/2021 12:00:00 AM ED

T [facility] 

([facility])

 

                    I25.10              Coronary arteriosclerosis after percutan

eous coronary angioplasty 

Coronary arteriosclerosis after percutaneous coronary angioplasty Problem       

            

06/15/2021 12:00:00 AM EDT              [facility] ([facility])

 

             Z71.3        Dietary management surveillance Dietary management jovanni

veillance Problem      

06/15/2021 12:00:00 AM EDT              [facility] ([facility])

 

             E78.2        Mixed hyperlipidemia Mixed hyperlipidemia Problem     

 06/15/2021 12:00:00 AM 

EDT                                     MED[facility] ([facility])

 

           R07.9      Chest pain Chest pain Problem    06/15/2021 12:00:00 AM ED

T MED[facility] 

([facility])

 

             I35.0        Aortic valve disorder Aortic valve disorder Problem   

   06/15/2021 12:00:00 AM

 EDT                                    [facility] ([facility])

 

             I10          Essential hypertension Essential hypertension Problem 

     06/15/2021 12:00:00 AM

 EDT                                    [facility] ([facility])

 

                    Z95.5               Patient post percutaneous transluminal c

oronary angioplasty Patient post 

percutaneous transluminal coronary angioplasty Problem                   06/15/2

021 12:00:00 AM 

EDT                                     [facility] ([facility])

 

                    Z95.1               History of coronary artery bypass grafti

ng History of coronary artery 

bypass grafting     Problem             06/15/2021 12:00:00 AM EDT [facility] ([facility]

 

           [id number]   Chest pain Chest pain Problem    06/15/2021 12:00:00 AM ED

T MEDENT 

([facility])

 

             [id number]      Aortic valve disorder Aortic valve disorder Problem   

   06/15/2021 12:00:00 

AM EDT                                  MED[facility] ([facility])

 

             [id number]    Syncope and collapse Syncope and collapse Problem     

 06/15/2021 12:00:00 

AM EDT                                  [facility] ([facility])

 

                    [id number]            Right bundle branch block AND left anter

ior fascicular block Right 

bundle branch block AND left anterior fascicular block Problem                  

 06/15/2021 

12:00:00 AM EDT                         [facility] ([facility])

 

                    [id number]           Patient post percutaneous transluminal c

oronary angioplasty Patient 

post percutaneous transluminal coronary angioplasty Problem                   06

/15/2021 12:00:00 

AM EDT                                  [facility] ([facility])

 

           [id number]   Palpitations Palpitations Problem    06/15/2021 12:00:00 A

M EDT MED[facility] 

([facility])

 

           278598015  Overweight Overweight Problem    06/15/2021 12:00:00 AM ED

T MED[facility] 

([facility])

 

             4590585      Old myocardial infarction Old myocardial infarction Pr

oblem      06/15/2021 

12:00:00 AM EDT                         [facility] ([facility])

 

             [id number]    Mixed hyperlipidemia Mixed hyperlipidemia Problem     

 06/15/2021 12:00:00 

AM EDT                                  [facility] ([facility])

 

                    [id number]           History of coronary artery bypass grafti

ng History of coronary artery 

bypass grafting     Problem             06/15/2021 12:00:00 AM EDT [facility] ([facility])

 

             [id number]     Essential hypertension Essential hypertension Problem 

     06/15/2021 

12:00:00 AM EDT                         [facility] ([facility])

 

             [id number]    Electrocardiogram abnormal Electrocardiogram abnormal 

Problem      

06/15/2021 12:00:00 AM EDT              [facility] ([facility])

 

             [id number]    Dizziness and giddiness Dizziness and giddiness Proble

m      06/15/2021 

12:00:00 AM EDT                         [facility] ([facility])

 

                [id number]       Dietary management surveillance Dietary manageme

nt surveillance 

Problem                   06/15/2021 12:00:00 AM [facility]T [facility]

 

                          [id number]         Coronary arteriosclerosis af

ter percutaneous coronary 

angioplasty               Coronary arteriosclerosis after percutaneous coronary 

angioplasty 

Problem                   06/15/2021 12:00:00 AM EDT [facility]

 

           R06.02     Dyspnea    Dyspnea    Problem    2021 12:00:00 AM ED

T [facility]

 

                G47.33          Obstructive sleep apnea syndrome Obstructive sle

ep apnea syndrome Problem

                          2021 12:00:00 AM EDT MED[facility] ([facility])

 

           Z87.891    Ex-smoker  Ex-smoker  Problem    2021 12:00:00 AM ED

[facility]

 

             I25.2        [id number]    History of MI (myocardial infarction) Pro

blem      2021 

12:00:00 AM EDT                         eCW1 ([facility])

 

             Z87.898      [id number]    History of solitary pulmonary nodule Prob

jody      2021 

12:00:00 AM EDT                         eCW1 ([facility])

 

             I10          [id number]     Essential (primary) hypertension Problem 

     2020 12:00:00 AM EDT

                                        eCW1 ([facility])



                                                                                
                                                                                
                                                                                
                                                                                
                                                                                
                                                                                
                                                                                
                                                                                
                                                                                
                                      



Surgeries/Procedures

          



             Procedure    Description  Date         Indications  Data Source(s)

 

             OFFICE OUTPATIENT VISIT 15 MINUTES              2021 12:00:00

 AM EDT              [facility] 

[facility]

 

             External ECG Rec>48HR<7D Recording              09/10/2021 12:00:00

 AM EDT              [facility] 

[facility]

 

             External ECG Rec>48HR<7D Review & Interpretation              09/10

/2021 12:00:00 AM EDT              

MED[facility] ([facility])

 

             ECHO TTHRC R-T 2D W/WOM-MODE COMPL SPEC&COLR DOP               12:00:00 AM EDT              

MED[facility] ([facility])

 

             Complex Chronic Care Management SVC 1St 60 Min              

021 12:00:00 AM EDT              

MED[facility] ([facility])

 

             Complex Chronic Care MGMT Service Ea Addl 30 Min               12:00:00 AM EDT              

[facility] ([facility])

 

             MYOCRD IMAGE PET PERFUS MULTPL STUDY REST/STRESS              08/10

/2021 12:00:00 AM EDT              

MED[facility] ([facility])

 

             CV STRS TST XERS&/OR RX CONT ECG I&R ONLY              08/10/2021 1

2:00:00 AM EDT              [facility] 

([facility])

 

                    Arterial Pressure Waveform Analysis For Assessment Of Centra

l Art                     07/15/2021 

12:00:00 AM EDT                                     [facility] ([facility]

 

             OFFICE OUTPATIENT VISIT 15 MINUTES              07/15/2021 12:00:00

 AM EDT              MED[facility] 

([facility])

 

             DUPLEX SCAN EXTRACRANIAL ART COMPL BI STUDY              2021

 12:00:00 AM EDT              

MED[facility] ([facility])

 

             ECG ROUTINE ECG W/LEAST 12 LDS W/I&R              06/15/2021 12:00:

00 AM EDT              MED[facility] 

([facility])

 

                    Arterial Pressure Waveform Analysis For Assessment Of Centra

l Art                     06/15/2021 

12:00:00 AM EDT                                     [facility] ([facility]

 

             OFFICE OUTPATIENT NEW 45 MINUTES              06/15/2021 12:00:00 A

M EDT              [facility] 

([facility])

 

                    Ultrasound procedure on topographic region (procedure)      

               2021 03:00:00 PM 

EDT                                                 [facility]

l

 

             Xray Wrist complete LT              2021 01:55:00 PM EDT     

         [facility]

 

             Xray Forearm AP/LAT LT              2021 01:55:00 PM EDT     

         [facility]

 

             Spirometry                2021 12:00:00 AM EDT              M

ELAINE ([facility], 

)

 

             CYSTOURETHROSCOPY              2020 12:00:00 AM EST          

    [facility] ([facility])



                                                                                
                                                                                
                                                                                
                            



Results

          



                    ID                  Date                Data Source

 

                    [id number]            2021 10:19:00 AM EDT [facility] ([facility])









          Name      Value     Range     Interpretation Code Description Data Velia

rce(s) Supporting 

Document(s)

 

           Cobalamin (Vitamin B12) [Mass/volume] in Serum or Plasma 689 pg/mL  2

                          

MED[facility] ([facility])    

 

                                        VITAMIN B12 NORMAL RANGE



NORMAL                     247 - 911 PG/ML

INDETERMINATE              211 - 246 PG/ML

DEFICIENT              LESS THAN 211 PG/ML

 









                    ID                  Date                Data Source

 

                    [id number]            2021 10:19:00 AM EDT [facility] ([facility])









          Name      Value     Range     Interpretation Code Description Data Velia

rce(s) Supporting 

Document(s)

 

          Cholesterol Level 191 mg/dL                               [facility] ([facility])  

 

          Triglycerides Level 316 mg/dL                               [facility] ([facility]

[facility])  

 

          LDL Cholesterol 85 mg/dL                                [facility] ([facility]

[facility])  

 

          Non-HDL-C 148 mg/dL                               [facility]  

 

          HDL Cholesterol 43 mg/dL                                [facility] ([facility])  

 

          Cholesterol Risk Ratio 4.441                                   [facility] 

([facility])  









                    ID                  Date                Data Source

 

                    VITAMIN B12 LEVEL   2021 12:00:00 AM EDT eCW1 ([facility])









          Name      Value     Range     Interpretation Code Description Data Velia

rce(s) Supporting 

Document(s)

 

                    [id number]             VITAMIN B12 LEVEL eCW1 ([facility])  









                    ID                  Date                Data Source

 

                    LIPID PANEL (CARDIAC RISK) 2021 12:00:00 AM EDT eCW1 (

[facility])









          Name      Value     Range     Interpretation Code Description Data Velia

rce(s) Supporting 

Document(s)

 

             Triglyceride [Mass/volume] in Serum or Plasma by calculation 316   

       <150                      

TRIGLYCERIDES LEVEL       eCW1 ([facility])  

 

           Cholesterol [Moles/volume] in Serum or Plasma 191        <200        

          CHOLESTEROL LEVEL eCW1 

([facility])         

 

             Cholesterol in LDL [Mass/volume] in Serum or Plasma by calculation 

85           <100                      LDL

 CHOLESTEROL              eCW1 ([facility])  

 

           Cholesterol in HDL [Moles/volume] in Serum or Plasma 43         >40  

                 HDL CHOLESTEROL 

eC1 ([facility])    

 

                    4.441     <5                  CHOLESTEROL RISK RATIO eCW ([facility])  

 

                    148                           NON-HDL-C eCW1 ([facility])  









                    ID                  Date                Data Source

 

                    [id number]              2021 11:35:00 AM EDT [facility] ([facility])









          Name      Value     Range     Interpretation Code Description Data Velia

rce(s) Supporting 

Document(s)

 

           Carotid Ultrasound Bilateral Laboratory test result                  

                [facility] ([facility])                         









                    ID                  Date                Data Source

 

                    [id number]        2021 03:42:00 PM EDT [facility] [address] [place] [address]                

                                  [phone]  NAME                          
                    SEX    PT STATUS         ACCOUNT NUMBER  LORI TOWNSEND   [facility]               [id number]    ORDERING
 PHYSICIAN                                LOCATION                 MEDICAL 
RECORD NO.  Joselito Vicente DO                                   ER                
       [id number]    ATTENDING PHYSICIAN                               DATE OF 
BIRTH            DATE OF EXAM/TIME  UMAIR MARTINEZ                        
      1945    TYPE / EXAM  US EXTREMITY NON 
VASC LIMITED    REASON FOR EXAM  L wrist- ulnar aspect, ? large ganglion causing
 pn  Clinical History/Indication for Exam:   L wrist- ulnar aspect, ? large 
ganglion causing pn      US LEFT UPPER EXTREMITY NON-VASCULAR COMPLETE      
INDICATION:  L wrist- ulnar aspect, ? large ganglion causing pn      TECHNIQUE: 
 Real-time ultrasound scan of the left upper extremity with image documentation.
      COMPARISON:  Left wrist x-rays from 2021      FINDINGS:      Soft 
tissues:  Ultrasound of the soft tissues was performed in the area of concern.  
Along the ulnar aspect of the wrist at the site of the palpable soft tissue lump
 there is a lobulated hypoechoic cystic and septated structure measuring 1.6 x 
0.8 x 1.1 cm.  No internal vascularity or associated peripheral vascularity is 
seen.  No foreign body.      IMPRESSION:        1.  Findings may reflect a 
ganglion cyst or synovial cyst along the ulnar side of the wrist at the site of 
the palpable lump measuring up to 1.6 cm.   2.  Consider follow-up nonemergent 
MRI of the wrist.            ** REPORT SIGNATURE ON FILE  2021 (15:42 
Eastern Time ) **   Signed by: Kishan East M.D.         Reported By 
Kishan East MD on 21       Signed By Kishan East MD on 
21                                                    
______________________________________________   _______  _______               
                                                                          Date  
      Time  CC:   LUIGI MARTINEZ; Kishan East MD  Techn: TABSA       
      Trans Dt/Tm:             Trans by: DT             Prt Dt/Tm:    :
 Total DLP =    0.00 mGy-cm     : Total Radiation Dose =    0.0000 mSv 
   Lifetime Dose: 0 mSv   









          Name      Value     Range     Interpretation Code Description Data Evlia

rce(s) Supporting 

Document(s)

 

                                                                       









                    ID                  Date                Data Source

 

                    [id number]           2021 03:31:00 PM EDT [facility]

 

                                                                             ED 

Physician Documentation      NAME:      

           LORI TOWNSEND                      :                  1945
   ACCT#:                [id number]                      AGE:                 
 75           MR#:                  [id number]                       SERVICE 
DATE:           21     EMERGENCY DR:           Joselito Vicente DO             
                                            PRIMARY CARE DR:           UMAIR MARTINEZ PA-C                      ROOM#:                               HPI 
(Adult, General)  General  Chief Complaint: Musculoskeletal  Stated Complaint: L
 ELBOW/WRIST PAIN  Time Seen by Provider: 21 13:20  History of Present 
Illness Narrative: 75-year-old female with chief complaint of left wrist pain 
ongoing for a week or more withNoSignificant impairment with the exception of 
left fist unable to befully made because of pain, no sensory deficits, no 
similar issues in the past, was previously swollen more than it is now and 
spontaneously resolved for the past couple days.  She has mentioned it to her 
primary care physician but is apparently switching primary care physicians as 
she was unhappy with the care she was previously getting.Denies any other 
complaints.  Allergies/Home Meds                                                
Allergies        Allergy/AdvReac Type Severity Reaction Status Date / Time     
latex [Latex] Allergy Unknown  Verified 21 13:48     amoxicillin 
[Amoxicillin] AdvReac Intermediate Diarrhea Verified 21 13:48     
atorvastatin [From Lipitor] AdvReac Intermediate LEG CRAMPS Verified 21 
13:48       BAD       codeine [Codeine] AdvReac Intermediate ITCHING Verified 
21 13:48       AND NAUSEA       nitroglycerin AdvReac Intermediate 
MIGRAINE Verified 21 13:48    [From Nitro-Bid]   HEADACHES       
tetracycline [Tetracycline] AdvReac Intermediate Diarrhea Verified 21 
13:48     buspirone [Buspirone] AdvReac Mild DIARRHEA Verified 21 13:48   
  Adhesive Tape AdvReac Intermediate RASH, RAW, Uncoded 21 13:39       
VERY SORE                                                    Home Medications   
      Medication  Instructions  Recorded  Confirmed  Last Taken  Type     
aspirin 325 mg PO DAILY #30 tab 14 History     
clopidogrel [Plavix] 75 mg PO DAILY #30 tab 14 History  
   meclizine 25 mg PO QID #120 tab 14 History     
metoclopramide HCl 10 mg PO QID #120 tab 14 History     
metoprolol tartrate 25 mg PO BID #60 tab 14 History     
nitroglycerin 1 patch TRANSDERMAL DAILY #30 patch 14 Unknown 
History     pantoprazole [Protonix] 40 mg PO DAILY #30 tab 14 
Unknown History     ranolazine [Ranexa] 1,000 mg PO BID #60 tab 14 History     tramadol 2 tabs PO Q6HPRN #100 tab 14 History     albuterol sulfate [Proventil HFA] 90 mcg 
INHALATION PRN PRN 21 History     amlodipine [Norvasc] 
2.5 mg PO DAILY 21 History     escitalopram oxalate 10 
mg PO DAILY 21 History     febuxostat 40 mg PO DAILY 
21 History     valsartan [Diovan] 80 mg PO DAILY 
21 History          PMH (from Triage)  Patient Medical 
History  PMH Reviewed/Updated as Needed: Yes  PMH/PSH from Triage:              
         Medical History    Angina pectoris (Medical)   Carotid artery stenosis 
(Medical)   Coronary artery disease (Medical)   Eczema (Medical)   Endometriosis
 (Medical)   Esophagitis (Medical)   Fibromyalgia (Medical)   Gastrointestinal 
hemorrhage (Medical)   Heart failure (Medical)   Heart valve disorder (Medical) 
  Herniated discs in back (Medical)   Hiatal hernia (Medical)   History of MI 
(Medical)   Hyperlipidemia (Medical)   Hypertension (Medical)   Irritable colon 
(Medical)   Kidney stone (Medical)   Paget's disease of bone (Medical)   
Peripheral vascular disease (Medical)   Sleep apnea (Medical)   Stroke in 2014
 (Medical)   Transient ischemic attack (Medical)                          
Surgical History (Updated 19 @ 11:52 by [email or web address] [place])    Appendectomy (Surg
ical)   Carotid endarterectomy (Surgical)      Right      Left 2014  
Cataract extraction and insertion of intraocular lens (Surgical)      Both eyes 
 Cholecystectomy (Surgical)   History of - surgery (Surgical)      Multiple 
stents  History of - surgery (Surgical)   History of - surgery (Surgical)      
Discectomy L4-L5  History of - surgery (Surgical)      Valve replacement  
History of hysterectomy (Surgical)   Status post coronary artery bypass graft 
(Surgical)      3 vessel in   Status post tonsillectomy (Surgical)       Hx 
Drug Resistant Infections  Hx C.Diff: Yes (17)  Hx Other Resistant 
Infection?: No  Isolation: Standard precautions  Hx Recent Travel  Nurse 
screening for coronavirus:                          Recent Travel outside the   
   No                                                    country (where)        
            Has patient experienced        No                                   
                 coronavirus symptoms                    Social History  Are you
 in a relationship with/Does anyone hit you, yell/swear at you, steal from you?:
 No  Substance Use  Second Hand Smoke Exposure: No  Smoking Status: Never smoker
  Vaccination History  Hx/Date of Tetanus, Diphtheria Vaccination: Yes  Hx/Date 
of Influenza Vaccination: No  Hx/Date of Pneumococcal Vaccination: No  
Immunizations Up to Date: Yes    [facility]  Medical History (Reviewed 21 @ 
13:47 by Carmen Griffin RN)    Angina pectoris  Carotid artery stenosis  
Coronary artery disease  Eczema  Endometriosis  Esophagitis  Fibromyalgia  
Gastrointestinal hemorrhage  Heart failure  Heart valve disorder  Herniated 
discs in back  Hiatal hernia  History of MI  Hyperlipidemia  Hypertension  
Irritable colon  Kidney stone  Paget's disease of bone  Peripheral vascular 
disease  Sleep apnea  Stroke in 2014  Transient ischemic attack      Surgical 
History (Reviewed 21 @ 13:47 by Carmen Griffin RN)    Appendectomy  
Carotid endarterectomy  Cataract extraction and insertion of intraocular lens  
Cholecystectomy  History of - surgery  History of - surgery  History of - 
surgery  History of - surgery  History of hysterectomy  Status post coronary 
artery bypass graft  Status post tonsillectomy      Family History  Mother      
No problems noted.    Father         No problems noted.    Brother      
No problems noted.    Sister      No problems noted.    Sister      No problems 
noted.                                                   Social History  Does 
the Patient have a Healthcare Proxy:  Yes   Does Patient have a DNR?:  No   Does
 Patient have a Living Will?:  No   Smoking Status:  Never smoker         ROS  
Review of Systems  ROS Narrative: General: Denies fever, chills.  Head: Denies 
headache, scalp tenderness.  Eyes: Denies blurry vision, double vision.  Ears: 
Denies earache, discharge.  Nose: Denies nosebleeds, discharge.  Neck: Denies 
pain, masses.  Heart: Denies chest pain, palpitations.  Lungs: Denies shortness 
of breath, orthopnea, cough.  GI: Denies nausea, vomiting, diarrhea, constipat
ion  : Denies dysuria, hematuria.  MUSK: Denies weakness, atrophy.Endorses 
pain in the left wrist  Skin: Denies rash, itching.  Neurologic: Denies 
numbness, tingling.  Psychiatric: Denies depression, anxiety.    Physical Exam  
General  Physical Exam Narrative: General: Patient is alert and oriented to 
person, place, and time. Appears in no acute distress.  Head: Normocephalic and 
atraumatic.  Eyes: Pupils equally round and react to light. Extraocular 
movements intact.  Ears: Gross hearing intact. External ears within normal 
limits.  Nose: No septal deviation. Nasal passages clear.  Throat: Moist oral 
mucosa. No erythema or exudate of the pharynx.  Neck: Supple. Trachea midline.  
Heart: Regular   Lungs: Clear to auscultation bilateral.  Abdomen: Soft, non-
tender, non-distended, and bowel sounds are present.  Genitourinary: Deferred.  
Rectal: Deferred.  Extremities: No cyanosis, clubbing, or edema.  Skin: Warm and
 dry.  Musculoskeletal:Noting on the left Volar ulnar wrist and approximate 2 x 
2 centimeter structure that is mildly tender to palpation and somewhat firm, 
nonmobile, reminiscent of ganglion structure, also noting a mild deformity in 
the mid left forearm which is chronic secondary to an old surgical procedure, no
 significant range of motion impairment with the exception of patient inability 
to make a full fist with her left hand given pain in that area previously 
described.  Normal sensory and capillary refill.  Neurologic: Cranial nerves II 
through XII are grossly intact. Sensation to light touch is intact.  
Psychiatric: Judgment and insight are seemingly intact. Mood and affect are 
appropriate for the situation.  Vital Signs  Vital Signs:                       
          Vital Signs         21  12:56     Temperature 98.3 F     Pulse 
Rate 68     Respiratory Rate 16     Blood Pressure 136/78     O2 Sat by Pulse 
Oximetry 97          MDM (comprehensive)  Medical Decision Making  Free 
Text/Narative:: 75-year-old female with chief complaint of left wrist pain 
ongoing for at least a few days that radiates up the arm intermittently but has 
improved slightly over the past 1 to 2 days with a notable swelling in the left 
ulnar volar surface of the wrist that is mildly tender to palpation, x-ray is 
reassuring, ultrasound notable for likely ganglionic structure, I recommend that
 she follow-up with either a primary care physician who is comfortable draining 
ganglion cysts or orthopedics to discuss drainage and or potential further work-
up to include MRI if indicated although at this time I feel MRI would be not 
warranted.  Discussed return precautions with patient.    Discharge Plan  
Admission/Discharge Dx  Primary DC Diagnosis: L wrist pain      ED Provider: 
Joselito Vicente    ED Status: Discharged    Time Seen by Provider: 21 13:20   
 Triaged At: 21 12:56    Condition  Condition: Stable    Discharge Detail 
 Disposition: Home, Self-Care    Med Rec   New Prescriptions:  No Action    
valsartan [Diovan] 80 mg tablet      80 mg PO DAILY RF: 0    amlodipine 
[Norvasc] 2.5 mg tablet      2.5 mg PO DAILY RF: 0    albuterol sulfate 
[Proventil HFA] 90 mcg/actuation HFA aerosol inhaler      90 mcg INHALATION PRN 
PRN (Reason: asthma) RF: 0    escitalopram oxalate 10 mg Tablet      10 mg PO 
DAILY RF: 0    febuxostat 40 mg Tablet      40 mg PO DAILY RF: 0    
nitroglycerin 1 EACH patch 24 hour      1 patch Transdermal DAILY Qty: 30 RF: 5 
  aspirin 325 MG tablet      325 mg PO DAILY Qty: 30 RF: 5    clopidogrel 
[Plavix] 75 MG tablet      75 mg PO DAILY Qty: 30 RF: 5    tramadol 50 MG tablet
      2 tabs PO Q6HPRN Qty: 100 RF: 0    pantoprazole [Protonix] 40 MG 
tablet,delayed release (DR/EC)      40 mg PO DAILY Qty: 30 RF: 5    
metoclopramide HCl 10 MG tablet      10 mg PO QID Qty: 120 RF: 5    meclizine 25
 MG tablet,chewable      25 mg PO QID Qty: 120 RF: 5    metoprolol tartrate 25 
MG tablet      25 mg PO BID Qty: 60 RF: 5    ranolazine [Ranexa] 1,000 MG tablet
 extended release 12 hr      1,000 mg PO BID Qty: 60 RF: 5    Follow Up 
Visit/Referrals:  Torrey Blackburn MD [PHYSICIAN] -     Follow Up Care/Instructions  
Diet/Activity/Wound Care..:  As discussed, Your ultrasound is still pending but 
your x-rays were normal.  There may be a component of a ganglion cyst causing yo
ur pain.  I would recommend following up with either your primary care physician
comfortable with potential drainage of ganglion cysts or an orthopedic group to 
further evaluate.  By then your ultrasound results should be backAnd they can 
review the images themselves.    Feel free to use compressive bandages, do 
regular stretching, and use Tylenol as needed for pain.    *Discharge Patient*  
Discharge Orders:  Discharge Order  (Routine); Ordered 21     Ordered By: 
Joselito Vicente    Discharge Date/Time: 21 15:40    Interventions  
Interventions:  ED Discharge Instructions   Last Done: 21 15:40  ED 
Musculoskeletal   Last Done: 21 13:46          Report Signers:  
<Electronically signed by Joselito Vicente DO>      Joselito Vicente DO      21 1854
    _________________________________________________         Joselito Vicente DO    
   SIGNATURE   DA    Report Cosigners:                                          
   D:  TEVIN  21  1531 T:  TEVIN    21  1531  CC:   LUIGI MARTINEZ         









          Name      Value     Range     Interpretation Code Description Data Velia

rce(s) Supporting 

Document(s)

 

                                                                       









                    ID                  Date                Data Source

 

                    [id number]        2021 02:43:00 PM EDT [facility] [address]

[place] [id number] [phone]  NAME                           
                  SEX    PT STATUS         ACCOUNT NUMBER  LORI TOWNSEND   REG ER               [id number]    ORDERING 
PHYSICIAN                                LOCATION                 MEDICAL RECORD
NO.  Joselito Vicente DO                                   ER                       
[id number]    ATTENDING PHYSICIAN                               DATE OF BIRTH   
        DATE OF EXAM/TIME  UMAIR MARTINEZ                              
[birth year]    TYPE / EXAM  Xray Forearm AP/LAT LT 
  REASON FOR EXAM  L distal forearm/wrist pain w/ pronation  Clinical 
History/Indication for Exam:   L distal forearm/wrist pain w/ pronation      
Left forearm x-ray 2 views.      Indication: Left distal forearm pain.      
Comparison: None available.      Findings: AP and lateral views of the left 
forearm show no fracture or dislocation.  Mild soft tissue swelling is seen in 
the distal forearm.  Mild joint space narrowing is seen in the radiocarpal 
joint.      Impression:       1.  No fracture is seen.   2.  Mild radiocarpal 
joint osteoarthritis.   3.  Distal forearm soft tissue swelling.      ** REPORT 
SIGNATURE ON FILE  2021 (14:43 Eastern Time ) **   Signed by: TIM Davenport        Reported By Ebenezer Jett MD on 21      Signed By Ebenezer Jett MD
on 21                          
______________________________________________   _______  _______  Date        
Time  CC:   LUIGI MARTINEZ; Ebenezer Jett MD  Techn: PELBU             Trans 
Dt/Tm:             Trans by: DT             Prt Dt/Tm:    [id number]: Total DLP =
   0.00 mGy-cm  Fluoroscopy Time (in secs):    









          Name      Value     Range     Interpretation Code Description Data Velia

rce(s) Supporting 

Document(s)

 

                                                                       









                    ID                  Date                Data Source

 

                    [id number]        2021 02:41:00 PM EDT [facility] [address] [place] [address]                

                                 [phone]  NAME                           
                  SEX    PT STATUS         ACCOUNT NUMBER  LORI TOWNSEND   [facility] ER               [id number]    ORDERING 
PHYSICIAN                                LOCATION                 MEDICAL RECORD
NO.  Joselito Vicente DO                                   ER                       
[id number]    ATTENDING PHYSICIAN                               DATE OF BIRTH   
        DATE OF EXAM/TIME  UMAIR MARTINEZ                              
1945    TYPE / EXAM  Xray Wrist complete LT 
  REASON FOR EXAM  radial pain in L wrist ++w/ pronation,?ganlion  Clinical 
History/Indication for Exam:   radial pain in L wrist ++w/ pronation,?ganlion   
  LEFT WRIST X RAY 4 VIEWS:       Indication: Radial wrist pain.      
Comparison: None available.      Findings: Frontal, lateral, and 2 oblique views
of the left wrist show no acute fracture or dislocation.  Small well-corticated 
osseous body is seen at the ulnar styloid likely representing a healed old 
avulsion fracture or an ossicle.  Prior resection of the trapezium is seen.  
Mild diffusesoft tissue swelling is identified.  Mild joint space narrowing is 
present in the radiocarpal joint.      IMPRESSION:       1.  No acute fracture 
is identified.   2.  Mild radiocarpal joint osteoarthritis.   3.  Prior 
resection of the trapezium.   4.  Mild diffuse soft tissue swelling is present. 
    ** REPORT SIGNATURE ON FILE  2021 (14:41 Eastern Time ) **   Signed 
by: Ebenezer Jett M.D.        Reported By Ebenezer Jett MD on 21      Signed
By Ebenezer Jett MD on 21                          
______________________________________________   _______  _______  Date        
Time  CC:   LUIGI MARTINEZ; Ebenezer Jett MD  Techn: PELBU             Trans 
Dt/Tm:             Trans by: DT             Prt Dt/Tm:    4168-0378: Total DLP =
   0.00 mGy-cm  Fluoroscopy Time (in secs):    









          Name      Value     Range     Interpretation Code Description Data Velia

rce(s) Supporting 

Document(s)

 

                                                                       









                    ID                  Date                Data Source

 

                    [id number]         2021 12:57:00 PM EDT [facility] ([facility])









          Name      Value     Range     Interpretation Code Description Data Velia

rce(s) Supporting 

Document(s)

 

          PDFReport Laboratory test result                               [facility] 

([facility])  

 

          FVC-Pre   2.07 L                                  [facility] ([facility])  

 

          FVC-%Pred-Pre 87 L                                    [facility] ([facility])  

 

          FVC-Pred  2.38 L                                  [facility] ([facility])  

 

          Fev1-Pred 1.76 L                                  [facility] ([facility])  

 

          FVC-LLN   1.75 L                                  [facility] ([facility])  

 

          Fev1-Pre  1.64 L                                  [facility] ([facility])  

 

          Fev1-%Pred-Pre 92 L                                    [facility])  

 

          Fev1-LLN  1.23 L                                  [facility] ([facility])  

 

          Fev6-Pred 2.25 L                                  [facility])  

 

          Fev6-%Pred-Pre 92 L                                    [facility])  

 

          Fev6-Pre  2.07 L                                  [facility] ([facility])  

 

          Fev6-LLN  1.63 L                                  [facility] ([facility])  

 

          Fev1fvc-Pre 79 %                                    [facility] ([facility])  

 

          Fev1fvc-Pred 74 %                                    [facility])  

 

          Gqt4gmf-APH 65 %                                    [facility] ([facility])  

 

          Fev6fvc-Pred 95 %                                    [facility])  

 

          Eak7nmm-%Pred-Pre 106 %                                   [facility])  

 

          Fev6fvc-Pre 100 %                                   [facility])  

 

          Qrr7bst-%Pred-Pre 105 %                                   [facility])  

 

          FEFMax-Pred 4.62 L/E/sec                               [facility])  

 

          FEFMax-Pre 4.56 L/E/sec                               [facility])  

 

          FEFMax-LLN 3.06 L/E/sec                               [facility])  

 

          FEFMax-%Pred-Pre 98 L/E/sec                               [facility])  

 

          Fef2575-Pred 1.39 L/E/sec                               [facility])  

 

          Fef2575-Pre 1.47 L/E/sec                               [facility])  

 

          ExpTime-Pre 4.27 sec                                [facility] ([facility])  

 

          Iuq7185-%Pred-Pre 105 L/E/sec                               [facility])  

 

          Icm2497-NHP 0.26 L/E/sec                               [facility])  

 

          Fev1fev6-Pre 79 %                                    [facility] ([facility] )  

 

          Fev1fev6-Pred 78 %                                    [facility] )  

 

          Lab5tjv8-NNJ 69 %                                    [facility] )  

 

          Gxs2hxk6-%Pred-Pre 101 %                                   [facility] )  









                    ID                  Date                Data Source

 

                    [id number]       2021 08:15:00 PM EDT [facility]

 

                                          Patient: LORI TOWNSEND Observation 

Report - Physicians/Mid Levels MRN: 

[id number].VisitID: [id number] [place] [address] [phone], FRegistration Date/Time: 2021 14:04 Weight:75.7 kg 
(S). Height/Length:61 inches (S). BMI:31.5        PAST HISTORYProblems:C. 
Difficile Colitis.Diverticulosis.Fibromyalgia.Bulging 
disc.Esophagitis.Nephrolithiasis.Myocardial 
Infarction.Gout.Diverticulitis.Diabetes Mellitus.Hypertension.Renal 
cancer.Gastroesophageal Reflux.Hyperlipidemia.Sleep Apnea.Dizziness 
[Intermittent].   Additional Surgeries:Appendectomy.Back Surgery.Bladder 
Suspension.Carotid Surgery.Cholecystectomy.Coronary Artery Bypass 
Graft.Hysterectomy.Lithotripsy.Nephrectomy.Partial colectomy with anastomosis.  
 Medications:Pantoprazole Sodium Oral 40 mg,  daily, last dose  
3/23/2021.Escitalopram Oxalate Oral 10 mg,  daily, last dose  3/23/2021.Repatha 
Subcutaneous (Solution Prefilled Syringe 140 mg/mL),  2x a day, last dose  
3/23/2021.Clopidogrel Bisulfate Oral 75 mg,  daily, last dose  
3/23/2021.Valsartan Oral 80 mg,  2x a day, last dose  3/23/2021.Meclizine HCl 
Oral 25 mg,  daily as needed, last dose  unknown last dose.Nitroglycerin 0.4 mg,
  as needed, last dose  unknown last dose.Percocet Oral 5/325 mg,  q6h as 
needed, last dose  unknown last dose.Klor-Con M10 Oral,  daily, last dose  
3/23/2021.Vitamin D3 Oral (Tablet 250 MCG (03805 UT)),  daily, last dose  
3/23/2021.Probiotic Daily Oral,  daily, last dose  3/23/2021.Tramadol HCL Oral 
50 mg,  q6h as needed, last dose  unknown last dose.Allopurinol Oral 300 mg,  
daily, last dose  3/23/2021.Metoprolol Tartrate Oral 50 mg,  2x a day, last dose
  3/23/2021.Ranolazine ER Oral (Tablet Extended Release 12 Hour 1000 mg) 1 
tablet,  2x a day, last dose  3/23/2021.Aspirin Oral (Tablet 325 mg) 1 tablet,  
daily, last dose  3/23/2021.  Allergies:Bactrim.(rash)Butabarbital 
Sodium.(hives)Codeine.(itching)Divalproex Sod.(nausea) 
(shakey)Erythromycin.(diarrhea)Tetracycline.(diarrhea).   FAMILY HISTORYNegative
. No significant family medical history.        (Electronically signed by RAI Lama 2021 19:55)   









          Name      Value     Range     Interpretation Code Description Data Velia

rce(s) Supporting 

Document(s)

 

                                                                       









                    ID                  Date                Data Source

 

                    [id number]  2021 05:38:00 PM EDT [facility]

l

 

                                        TSYSORDER [id number] 









          Name      Value     Range     Interpretation Code Description Data Velia

rce(s) Supporting 

Document(s)

 

          TROPONIN I < 0.017 ng/mL 0.000-0.056                     [facility]

l  









                    ID                  Date                Data Source

 

                    [id number]       2021 03:49:00 PM EDT [facility]

l

 

                                        DATE OF EXAMINATION: 3/23/2021 14:25 EDT

  CHEST 1 VIEW  HISTORY: Chest pain  

TECHNIQUE: Single frontal radiograph of chest COMPARISON: CT scan of 2019 
FINDINGS: No evidence of focal consolidation, pneumothorax or large pleural 
effusion.Lungs are clear. Mediastinal structures are unremarkable. No aggressive
 osseouslesions. Multiple midline sternotomy sutures, mediastinal staples as 
well ascoronary arterial stents are noted. IMPRESSION: No focal consolidation. 
Electronically signed in  by: Laura Nunez M.D. 3/23/2021 15:43 EDT  









          Name      Value     Range     Interpretation Code Description Data Velia

rce(s) Supporting 

Document(s)

 

                                                                       









                    ID                  Date                Data Source

 

                    [id number]   2021 02:59:00 PM EDT [facility]

l

 

                                        TSYSORDER [id number] 









          Name      Value     Range     Interpretation Code Description Data Velia

rce(s) Supporting 

Document(s)

 

          FREE T4   1.0 ng/dL 0.76-1.46                     [facility]  









                    ID                  Date                Data Source

 

                    [id number]   2021 02:59:00 PM EDT [facility]

l

 

                                        TSYSORDER [id number] 









          Name      Value     Range     Interpretation Code Description Data Velia

rce(s) Supporting 

Document(s)

 

          TSH       2.105 uIU/mL 0.360-3.740                     [facility] 

 









                    ID                  Date                Data Source

 

                    [id number]     2021 02:53:00 PM EDT [facility]

l

 

                                        TSYSORDER [id number]

R [id number] 









          Name      Value     Range     Interpretation Code Description Data Velia

rce(s) Supporting 

Document(s)

 

          MAGNESIUM 1.8 mg/dL 1.8-2.4                       [facility]  









                    ID                  Date                Data Source

 

                    [id number]  2021 02:53:00 PM EDT [facility]

l

 

                                        TSYSORDER [id number]

R [id number] 









          Name      Value     Range     Interpretation Code Description Data Velia

rce(s) Supporting 

Document(s)

 

          TROPONIN I < 0.017 ng/mL 0.000-0.056                     [facility]

  









                    ID                  Date                Data Source

 

                    [id number]    2021 02:53:00 PM EDT [facility]

l

 

                                        TSYSORDER [id number]

R [id number] 









          Name      Value     Range     Interpretation Code Description Data Velia

rce(s) Supporting 

Document(s)

 

          LIPASE    192 U/L                           [facility]  









                    ID                  Date                Data Source

 

                    [id number]    2021 02:53:00 PM EDT [facility]

l

 

                                        TSYSORDER [id number]

R [id number] 









          Name      Value     Range     Interpretation Code Description Data Velia

rce(s) Supporting 

Document(s)

 

          GLUCOSE   96 mg/dL                          [facility]  

 

          BLOOD UREA NITROGEN 30 mg/dL  7-18      H                   [facility]

ital  

 

          CREATININE 1.69 mg/dL 0.6-1.0   H                   [facility]  

 

          SODIUM    140 mmol/L 136-145                       [facility]  

 

          POTASSIUM 4.4 mmol/L 3.5-5.1                       [facility]  

 

          CHLORIDE  105 mmol/L                         [facility]  

 

          CO2       24 mmol/L 21-32                         [facility]  

 

          CALCIUM   9.5 mg/dL 8.5-10.1                      [facility]  

 

          ANION GAP 11.0 mmol/L 5-12                          [facility]  

 

          GLOMERULAR FILTRATION RATE 29 mL/min                               [facility]  

 

                                        GFR IS CALCULATED IN mL/min/1.73m2 ABRAHAM

L FUNCTION:                          

>90MILDLY DECREASED:                        60-89MILDY TO MODERATELY DECREASED: 
          45-59        MODERATELY TO SEVERELY DECREASED:        30-44SEVERELY 
DECREASED:                      15-29RENAL FAILURE:                            
<15 

 

          AST       18 U/L    15-37                         [facility]  

 

          ALT       31 U/L    12-78                         [facility]  

 

          ALKALINE PHOSPHATASE 52 U/L                            [facility]

pital  

 

          TOTAL BILIRUBIN 0.4 mg/dL 0.2-1.0                       [facility]

  

 

          TOTAL PROTEIN 7.8 g/dl  6.4-8.2                       [facility]  

 

          ALBUMIN   4.1 gm/dL 3.4-5.0                       [facility]  









                    ID                  Date                Data Source

 

                    [id number]   2021 02:49:00 PM EDT [facility]

 

                                        TSYSORDER [id number] 









          Name      Value     Range     Interpretation Code Description Data Velia

rce(s) Supporting 

Document(s)

 

          PARTIAL THROMBOPLASTIN TIME 22.8 SECONDS 21.2-27.3                    

 [facility]  









                    ID                  Date                Data Source

 

                    [id number]    2021 02:49:00 PM EDT [facility]

 

                                        TSYSORDER [id number] 









          Name      Value     Range     Interpretation Code Description Data Velia

rce(s) Supporting 

Document(s)

 

          PROTHROMBIN TIME (PATIENT) 10.4 SECONDS 9.1-11.6                      

[facility]  

 

          INR       1.00      0.87-1.06                     [facility]  









                    ID                  Date                Data Source

 

                    [id number]   2021 02:32:00 PM T [facility]

 

                                        TSYSORDER [id number] 









          Name      Value     Range     Interpretation Code Description Data Velia

rce(s) Supporting 

Document(s)

 

          WHITE BLOOD COUNT 8.1 K/mm3 4.0-10.0                      [facility]

al  

 

          RED BLOOD COUNT 3.05 M/mm3 4.00-5.50 L                   [facility]  

 

          HEMOGLOBIN 11.0 gm/dL 12.0-16.0 L                   [facility]  

 

          HEMATOCRIT 32.4 %    36.0-48.8 L                   [facility]  

 

          MEAN CELL VOLUME 106.2 fl  80-96     *H                  [facility]  

 

          MEAN CORPUSCULAR HEMOGLOBIN 36.1 pg   27.0-31.0 H                   [facility]  

 

          MEAN CORPUSCULAR HGB CONC 34.0 g/dl 32.0-36.0                     [facility]  

 

          RED CELL DISTRIBUTION WIDTH 13.0 %    10.0-14.5                     [facility]  

 

          PLATELET COUNT 173 K/mm3 172-450                       [facility] 

 

 

          MEAN PLATELET VOLUME 10.4 fl   9.0-13.0                      [facility]

pital  

 

          GRAN %    58.6 %    50-80.0                       [facility]  

 

          IG%       1.0 %     0.0-0.2   H                   [facility]  

 

          LYMPH %   27.9 %    25.0-50.0                     [facility]  

 

          MONO %    12.2 %    2.0-10.0  H                   [facility]  

 

          EOS %     0.1 %     0-5.0                         [facility]  

 

          BASO %    0.2 %     0.0-2.0                       [facility]  

 

          GRAN #    4.7 K/mm3 2.0-8.00                      [facility]  

 

          IG#       0.1 K/mm3 0.0-0.2                       [facility]  

 

          LYMPH #   2.3 K/mm3 1.0-5.0                       [facility]  

 

          MONO #    1.0 K/mm3 0.10-1.20                     [facility]  

 

          EOS #     0.0 K/mm3 0.0-0.5                       [facility]  

 

          BASO #    0.0 K/mm3 0.0-0.2                       [facility]  









                    ID                  Date                Data Source

 

                    [id number]            2021 12:48:00 PM EDT [facility] ([facility]

[facility])









          Name      Value     Range     Interpretation Code Description Data Velia

rce(s) Supporting 

Document(s)

 

          Thyroid Stimulating Hormone 2.105                                   ME

[facility] ([facility])  









                    ID                  Date                Data Source

 

                    [id number]            2021 12:48:00 PM EDT [facility] ([facility]

[facility])









          Name      Value     Range     Interpretation Code Description Data Velia

rce(s) Supporting 

Document(s)

 

          Red Blood Count 3.05                                    [facility] ([facility]

[facility])  

 

          White Blood Count 8.1                                     [facility] (Card

[facility])  

 

          Hemoglobin 11.0                                    [facility] ([facility]  

 

          Platelets 173                                     [facility] ([facility]  

 

          Hematocrit 32.4                                    [facility] ([facility])  









                    ID                  Date                Data Source

 

                    [id number]            2021 12:48:00 PM EDT [facility] ([facility]

[facility])









          Name      Value     Range     Interpretation Code Description Data Velia

rce(s) Supporting 

Document(s)

 

             Alanine aminotransferase [Enzymatic activity/volume] in Serum or Pl

asma 31                                     

                          [facility] ([facility])  

 

           Albumin [Mass/volume] in Serum or Plasma 4.1                         

                [facility] ([facility])                       

 

           Calcium [Mass/volume] in Serum or Plasma 9.5                         

                [facility] ([facility])                       

 

           Chloride [Moles/volume] in Serum or Plasma 105                       

                  [facility] ([facility])                       

 

           Carbon dioxide, total [Moles/volume] in Serum or Plasma 24           

                               [facility] 

([facility])           

 

           Alkaline phosphatase [Enzymatic activity/volume] in Serum or Plasma 5

2                                          

[facility] ([facility])    

 

           Potassium [Moles/volume] in Serum or Plasma 4.4                      

                   [facility] ([facility])                       

 

           Protein [Mass/volume] in Serum or Plasma 7.8                         

                [facility] ([facility])                       

 

          Sodium    140                                     [facility] ([facility]  

 

                Aspartate aminotransferase [Enzymatic activity/volume] in Serum 

or Plasma 18                              

                                        [facility] ([facility])  

 

          Glucose   96                                      [facility] ([facility]  

 

           Urea nitrogen [Mass/volume] in Serum or Plasma 30                    

                      [facility] ([facility])                       

 

          Creatinine For GFR 1.69                                    [facility] ([facility]

[facility])  









                    ID                  Date                Data Source

 

                    [id number]         2020 10:42:00 AM EST [facility] ([facility] [place]









          Name      Value     Range     Interpretation Code Description Data Velia

rce(s) Supporting 

Document(s)

 

           BodySite   Laboratory test result                                  ME

[facility] ([facility] [place]                                   

 

                                        Voided - Clean Catch

 

 

           Clinical History Laboratory test result                              

    [facility] ([facility]                     

 

           Specimen Adequacy Laboratory test result                             

     [facility] ([facility]                     

 

                                        Satisfactory for evaluation.

 

 

           Microscopic Description Laboratory test result                       

           [facility] ([facility]                     

 

           Gross Description Laboratory test result                             

     [facility] ([facility]                     

 

                                        Received in a specimen container, labele

d with the patients name and ,

is Cloudy Yellow fluid consistent with urine, measuring approximately 60 ml.

 

 

          CPTCode   81823                                   [facility] ([facility] [place]  

 

           Final Diagnosis Laboratory test result                               

   [facility] ([facility])                     

 

                                        NEGATIVE FOR HIGH-GRADE UROTHELIAL CARCI

NOMA.

 

 

           PDF Report Laboratory test result                                  ME

[facility] ([facility] [place])                                  

 

           [id number]   Laboratory test result                                  ME

[facility] ([facility] [place]                                   









                    ID                  Date                Data Source

 

                    [id number]         2020 10:42:00 AM EST [facility] ([facility] [place]









          Name      Value     Range     Interpretation Code Description Data Velia

rce(s) Supporting 

Document(s)

 

           Glucose [Presence] in Urine Laboratory test result                   

               [facility] ([facility])             

 

           Ua Nitrite Laboratory test result                                  ME

[facility] ([facility]

 [facility])                                  

 

           Protein [Presence] in Urine by Test strip Laboratory test result     

                             [facility] 

([facility])  

 

           Ua Leuko   Laboratory test result                                  ME

[facility] ([facility] [place]                                   

 

           Blood [Presence] in Urine by Visual Laboratory test result           

                       [facility] 

([facility])  

 

           Color of Urine Laboratory test result                                

  [facility] ([facility]                     

 

           Ketones [Presence] in Urine by Test strip Laboratory test result     

                             [facility] 

([facility])  

 

           Ua Specific Gravity 1.025      1.003-1.030                       [facility]

NT ([facility])                     

 

           Clarity of Urine Laboratory test result                              

    [facility] ([facility]                     

 

           pH of Urine by Test strip 5.0        5.0-7.5                         

 [facility] ([facility]                     

 

           Urobilinogen [Mass/volume] in Urine by Test strip 0.2 E.U./dL 0.0-1.0

                          [facility]

 ([facility])  

 

           Bilirubin.total [Presence] in Urine by Test strip Laboratory test res

ult                                  

[facility] ([facility])  









                    ID                  Date                Data Source

 

                    [id number]         2020 10:42:00 AM EST [facility] ([facility] 

[facility])









          Name      Value     Range     Interpretation Code Description Data Velia

rce(s) Supporting 

Document(s)

 

           Cytology report of Urine Cyto stain Laboratory test result           

                       [facility] 

([facility])  









                    ID                  Date                Data Source

 

                    [id number]       2020 03:35:00 PM EDT [facility]

l

 

                                        DATE OF EXAMINATION: 2020 12:52 EDT

 ABD/PEL WITH ORAL ONLY  HISTORY: 

Abdominal pain TECHNIQUE:  This CT exam was performed using the following dose 
reduction techniques:automated exposure control, adjustment of mA and/or kV 
according to thepatient's size, and use of iterative reconstruction technique. 
Standard contiguous axial spiral imaging was obtained from the dome of 
thediaphragms through the symphysis pubis with oral contrast and 
withoutintravenous contrast administration and with coronal reformatting. 
FINDINGS: Lower thorax: Unremarkable ABDOMEN: Liver: Moderate fatty 
infiltrationGallbladder and bile ducts: CholecystectomyPancreas: 
UnremarkableSpleen: UnremarkableAdrenals: UnremarkableKidneys and ureters: Left 
nephrectomyStomach and bowel: Moderate acute diverticulitis of distal descending
 colon.There is some associated mucosal thickening which warrants colonoscopy 
once thepatient's current presentation subsides. Diffuse mucosal thickening of 
therectosigmoid is also suspicious for superimposed colitis. Mild secondary 
ileusis seenAppendix: No appendicitis PELVIS: Bladder: Nondistended and 
nonopacified therefore unable to evaluateReproductive: Hysterectomy and 
oophorectomy No free fluid or free air IMPRESSION: Diffuse colitis mainly 
involving the rectosigmoid colon. There is focal severeinflammatory changes of 
distal descending colon consistent with superimposeddiverticulitis as well. 
There is no perforation. Colonoscopy is recommended oncethe patient's current 
presentation subsides. Electronically signed in  by: Laura Nunez M.D. 
2020 15:29 EDT  









          Name      Value     Range     Interpretation Code Description Data [facility](s) Supporting 

Document(s)

 

                                                                       









                    ID                  Date                Data Source

 

                    [id number]    2020 01:13:00 PM EDT [facility]









          Name      Value     Range     Interpretation Code Description Data [facility](s) Supporting 

Document(s)

 

          GLUCOSE   102 mg/dL                         [facility]  

 

          BLOOD UREA NITROGEN 16 mg/dL  7-18                          [facility]

ital  

 

          CREATININE 1.3 mg/dL 0.6-1.0   H                   [facility]  

 

          SODIUM    139 mmol/L 136-145                       [facility]  

 

          POTASSIUM 4.7 mmol/L 3.5-5.1                       [facility]  

 

          CHLORIDE  103 mmol/L                         [facility]  

 

          CO2       26 mmol/L 21-32                         [facility]  

 

          CALCIUM   8.6 mg/dL 8.5-10.1                      [facility]  

 

          ANION GAP 10.0 mmol/L 5-12                          [facility]  

 

          GLOMERULAR FILTRATION RATE 40 mL/min                               [facility]  

 

                                        GFR IS CALCULATED IN mL/min/1.73m2 ABRAHAM

L FUNCTION:                          

>90MILDLY DECREASED:                        60-89MILDY TO MODERATELY DECREASED: 
          45-59        MODERATELY TO SEVERELY DECREASED:        30-44SEVERELY 
DECREASED:                      15-29RENAL FAILURE:                            
<15 

 

          AST       19 U/L    15-37                         [facility]  

 

          ALT       27 U/L    12-78                         [facility]  

 

          ALKALINE PHOSPHATASE 69 U/L                            [facility]

pital  

 

          TOTAL BILIRUBIN 0.5 mg/dL 0.2-1.0                       [facility]

  

 

          TOTAL PROTEIN 7.5 g/dl  6.4-8.2                       [facility]  

 

          ALBUMIN   3.9 gm/dL 3.4-5.0                       [facility]  









                    ID                  Date                Data Source

 

                    CT ABD/PEL WITH ORAL ONLY - [id number] 2020 04:54:45 AM EDT

 [email or web address] ([facility])









          Name      Value     Range     Interpretation Code Description Data Velia

rce(s) Supporting 

Document(s)

 

                                                       CT ABD/PEL WITH ORAL ONLY

 - [id number] eCW1 ([facility])                                  







                                        Procedure

 

                                          



                                                                                
                                                                                
                                                                                
                                                                                
                                                                              



Social History

          



           Code       Duration   Value      Status     Description Data Source(s

)

 

           Smoking    10/19/2021 12:00:00 AM EDT Never Smoker completed  Never S

moker [email or web address] 

([facility])

 

             Smoking      2021 12:00:00 AM EDT Patient is a former smoker 

completed    Patient 

is a former smoker                      [facility] ([facility])

 

           Smoking    2021 12:00:00 AM EDT Never Smoker completed  Never S

moker [email or web address] 

([facility])

 

           Smoking    2021 12:00:00 AM EDT Never Smoker completed  Never S

moker [email or web address] 

([facility])

 

           Smoking    2021 12:00:00 AM EDT Never Smoker completed  Never S

moker [email or web address] 

([facility])

 

           Smoking    2021 12:00:00 AM EDT Never Smoker completed  Never S

moker [email or web address] 

([facility])

 

           Smoking    2021 12:00:00 AM EDT Never Smoker completed  Never S

moker [email or web address] 

([facility])

 

                      2021 03:32:35 PM EDT Never smoker completed  Never s

[facility]

 

           Smoking    2021 03:32:00 PM EDT Never smoker completed  Never s

[facility]

 

           Smoking    2021 12:00:00 AM EDT Non Smoker completed  Non Smoke

r [facility] 

([facility], )

 

           Smoking    2021 12:00:00 AM EDT Former Smoker completed  Former

 Smoker [email or web address] 

([facility])

 

           Smoking    2021 12:00:00 AM EDT Former Smoker completed  Former

 Smoker [email or web address] 

([facility])

 

           Smoking    2021 12:00:00 AM EDT Former Smoker completed  Former

 Smoker [email or web address] 

([facility])

 

           Smoking    2021 12:00:00 AM EDT Former Smoker completed  Former

 Smoker [email or web address] 

([facility])

 

           Smoking    2021 12:00:00 AM EDT Former Smoker completed  Former

 Smoker [email or web address] 

([facility])

 

           Smoking    2021 12:00:00 AM EDT Former Smoker completed  Former

 Smoker [email or web address] 

([facility])

 

           Smoking    2021 12:00:00 AM EDT Former Smoker completed  Former

 Smoker [email or web address] 

([facility])

 

           Smoking    2020 12:00:00 AM EST Former Smoker completed  Former

 Smoker [email or web address] 

([facility])

 

           Smoking    2020 12:00:00 AM EST Former Smoker completed  Former

 Smoker [email or web address] 

([facility])

 

           Smoking    2020 12:00:00 AM EST Former Smoker completed  Former

 Smoker [email or web address] 

([facility])

 

           Smoking    2020 12:00:00 AM EST Former Smoker completed  Former

 Smoker [email or web address] 

([facility])

 

           Smoking    2020 12:00:00 AM EST Former Smoker completed  Former

 Smoker [email or web address] 

([facility])

 

           Smoking    2020 12:00:00 AM EST Former Smoker completed  Former

 Smoker [email or web address] 

([facility])

 

           Smoking    2020 12:00:00 AM EST Former Smoker completed  Former

 Smoker [email or web address] 

([facility])

 

           Smoking    2020 12:00:00 AM EST Former Smoker completed  Former

 Smoker [email or web address] 

([facility])

 

           Smoking    2020 12:00:00 AM EST Former Smoker completed  Former

 Smoker [email or web address] 

([facility])

 

           Smoking    2020 12:00:00 AM EST Former Smoker completed  Former

 Smoker [email or web address] 

([facility])

 

           Smoking    2020 12:00:00 AM EST Former Smoker completed  Former

 Smoker [email or web address] 

([facility])

 

           Smoking    2020 12:00:00 AM EST Former Smoker completed  Former

 Smoker [email or web address] 

([facility])

 

             Smoking      2020 12:00:00 AM EST Former Cigarette Smoker com

pleted    Former 

Cigarette Smoker                        [facility] ([facility] [place])

 

           Smoking    2020 12:00:00 AM EST Former Smoker completed  Former

 Smoker [email or web address] 

([facility])

 

           Smoking    2020 12:00:00 AM EST Former Smoker completed  Former

 Smoker [email or web address] 

([facility])

 

           Smoking    2020 12:00:00 AM EST Former Smoker completed  Former

 Smoker [email or web address] 

([facility])

 

           Smoking    2020 12:00:00 AM EDT Former Smoker completed  Former

 Smoker [email or web address] 

([facility])

 

           Smoking    2020 12:00:00 AM EDT Former Smoker completed  Former

 Smoker [email or web address] 

([facility])

 

           Smoking    2020 12:00:00 AM EDT Former Smoker completed  Former

 Smoker [email or web address] 

([facility])

 

           Smoking    2020 12:00:00 AM EDT Former Smoker completed  Former

 Smoker [email or web address] 

([facility])

 

           Smoking    2020 12:00:00 AM EDT Former Smoker completed  Former

 Smoker [email or web address] 

([facility])

 

           Smoking    2020 12:00:00 AM EDT Former Smoker completed  Former

 Smoker [email or web address] 

([facility])

 

           Smoking    2020 12:00:00 AM EDT Former Smoker completed  Former

 Smoker [email or web address] 

([facility])

 

           Smoking    2020 12:00:00 AM EDT Former Smoker completed  Former

 Smoker [email or web address] 

([facility])

 

           Smoking    2020 12:00:00 AM EDT Former Smoker completed  Former

 Smoker [email or web address] 

([facility])

 

           Smoking    2020 12:00:00 AM EDT Former Smoker completed  Former

 Smoker [email or web address] 

([facility])

 

           Smoking    2020 12:00:00 AM EDT Former Smoker completed  Former

 Smoker [email or web address] 

([facility])

 

           Smoking    2020 12:00:00 AM EDT Former Smoker completed  Former

 Smoker [email or web address] 

([facility])

 

           Smoking    2020 12:00:00 AM EDT Former Smoker completed  Former

 Smoker [email or web address] 

([facility])

 

           Smoking    2020 12:00:00 AM EDT Former Smoker completed  Former

 Smoker [email or web address] 

([facility])

 

           Smoking    2020 12:00:00 AM EDT Former Smoker completed  Former

 Smoker [email or web address] 

([facility])

 

           Smoking    2020 12:00:00 AM EDT Former Smoker completed  Former

 Smoker [email or web address] 

([facility])

 

           Smoking    2020 12:00:00 AM EDT Former Smoker completed  Former

 Smoker [email or web address] 

([facility])



                                                                                
                                                                                
                                                                                
                                                                                
                                                                                
                                                                                
                                                                                
                            



Vital Signs

          



                    ID                  Date                Data Source

 

                    UNK                                      









           Name       Value      Range      Interpretation Code Description Data

 Source(s)

 

           Body weight 158.00 [lb_av]                       158.00 [lb_av] [facility]

T ([facility])

 

           Body height 61 [in_i]                        61 [in_i]  MEDENT ([facility])

 

                                        5'1" 

 

           Body mass index (BMI) [Ratio] 29.9 kg/m2                       29.9 k

g/m2 MED[facility] ([facility])

 

           Heart rate 52 /min                          52 /min    [facility] ([facility]

[facility])

 

           Systolic blood pressure--sitting 170 mm[Hg]                       170

 mm[Hg] [facility] ([facility])

 

                                        CBP large cuff, Ra 

 

           Diastolic blood pressure--sitting 70 mm[Hg]                        70

 mm[Hg]  MED[facility] ([facility])

 

                                        CBP large cuff, Ra 

 

           Body weight 158.00 [lb_av]                       158.00 [lb_av] eCW1 

([facility])

 

           Body height 61 [in_i]                        61 [in_i]  eCW1 ([facility])

 

           Body mass index (BMI) [Ratio] 29.85 kg/m2                       29.85

 kg/m2 eCW1 ([facility])

 

           Heart rate 88 /min                          88 /min    eCW1 ([facility])

 

           Respiratory rate 18 /min                          18 /min    eCW1 ([facility])

 

           Body temperature 96.9 [degF]                       96.9 [degF] eCW1 (

[facility])

 

           Systolic blood pressure 138 mm[Hg]                       138 mm[Hg] e

CW1 ([facility])

 

           Diastolic blood pressure 82 mm[Hg]                        82 mm[Hg]  

eCW1 ([facility])

 

           Body weight 155.00 [lb_av]                       155.00 [lb_av] MEDEN

T ([facility])

 

           Heart rate 55 /min                          55 /min    MED[facility] ([facility]

[facility])

 

           Systolic blood pressure--sitting 148 mm[Hg]                       148

 mm[Hg] MEDENT ([facility])

 

                                        CBP, adult cuff/Ra 

 

           Body height 61 [in_i]                        61 [in_i]  MEDENT ([facility])

 

                                        5'1" 

 

           Diastolic blood pressure--sitting 63 mm[Hg]                        63

 mm[Hg]  MED[facility] ([facility])

 

                                        CBP, adult cuff/Ra 

 

           Body mass index (BMI) [Ratio] 29.3 kg/m2                       29.3 k

g/m2 MEDENT ([facility])

 

           Systolic blood pressure 130 mm[Hg]                       130 mm[Hg] M

EDENT ([facility])

 

           Diastolic blood pressure 80 mm[Hg]                        80 mm[Hg]  

MED[facility] ([facility])

 

           Body temperature 95.7 [degF]                       95.7 [degF] MEDENT

 ([facility])

 

           Body mass index (BMI) [Ratio] 29.7 kg/m2                       29.7 k

g/m2 MEDENT ([facility])

 

           Heart rate 56 /min                          56 /min    MED[facility] ([facility]

[facility])

 

           Body weight 157.00 [lb_av]                       157.00 [lb_av] MEDEN

T ([facility])

 

           Body height 61 [in_i]                        61 [in_i]  MEDENT ([facility])

 

                                        5'1" 

 

           Systolic blood pressure--sitting 152 mm[Hg]                       152

 mm[Hg] MEDENT ([facility])

 

                                        CBP, adult cuff/Ra 

 

           Diastolic blood pressure--sitting 63 mm[Hg]                        63

 mm[Hg]  MEDENT ([facility])

 

                                        CBP, adult cuff/Ra 

 

           Body weight 160.00 [lb_av]                       160.00 [lb_av] MEDEN

T ([facility])

 

           Body mass index (BMI) [Ratio] 30.2 kg/m2                       30.2 k

g/m2 [facility] ([facility])

 

           Ideal body weight 105 [lb_av]                       105 [lb_av] MEDEN

T ([facility])

 

           Body weight 72.576 kg                        72.576 kg  [facility] ([facility])

 

           Body surface area Derived from formula 1.72 m2                       

   1.72 m2    [facility] ([facility])

 

           Body height 61 [in_i]                        61 [in_i]  [facility] ([facility])

 

                                        5'1" 

 

           Systolic blood pressure 124 mm[Hg]                       124 mm[Hg] M

EDENT ([facility])

 

           Diastolic blood pressure 78 mm[Hg]                        78 mm[Hg]  

[facility] ([facility])

 

           Heart rate 85 /min                          85 /min    [facility] ([facility])

 

           Oxygen saturation in Arterial blood by Pulse oximetry 97 %           

                  97 %       [facility] 

([facility])

 

           Body temperature 97.0 [degF]                       97.0 [degF] [facility]

 ([facility])

 

           Body height 59.25 [in_i]                       59.25 [in_i] eCW1 ([facility])

 

           Body weight 161.2 [lb_av]                       161.2 [lb_av] eCW1 ([facility])

 

           Body mass index (BMI) [Ratio] 32.28 kg/m2                       32.28

 kg/m2 eCW1 ([facility])

 

           Body temperature 98.5 [degF]                       98.5 [degF] eCW1 (

[facility])

 

           Heart rate 66 /min                          66 /min    eCW1 ([facility])

 

           Respiratory rate 18 /min                          18 /min    eCW1 ([facility])

 

           Oxygen saturation in Arterial blood by Pulse oximetry 98 %           

                  98 %       eCW1 ([facility])

 

           Body height 59.25 [in_i]                       59.25 [in_i] eCW1 ([facility])

 

           Body weight 159.2 [lb_av]                       159.2 [lb_av] eCW1 ([facility])

 

           Body mass index (BMI) [Ratio] 31.88 kg/m2                       31.88

 kg/m2 eCW1 ([facility])

 

           Body temperature 98.6 [degF]                       98.6 [degF] eCW1 (

[facility])

 

           Heart rate 67 /min                          67 /min    eCW1 ([facility])

 

           Respiratory rate 18 /min                          18 /min    eCW1 ([facility])

 

           Oxygen saturation in Arterial blood by Pulse oximetry 98 %           

                  98 %       eCW1 ([facility])

 

           Body height 61 [in_i]                        61 [in_i]  [facility] ([facility]

 

                                        5'1" 

 

           Body weight 155.00 [lb_av]                       155.00 [lb_av] MEDEN

T ([facility])

 

           Body weight 70.308 kg                        70.308 kg  [facility] ([facility]

 

           Body mass index (BMI) [Ratio] 29.3 kg/m2                       29.3 k

g/m2 [facility] ([facility])

 

           Systolic blood pressure 143 mm[Hg]                       143 mm[Hg] M

EDENT ([facility])

 

           Diastolic blood pressure 76 mm[Hg]                        76 mm[Hg]  

MED[facility] ([facility])

 

           Heart rate 60 /min                          60 /min    [facility] ([facility]

 

           Body height 59.25 [in_i]                       59.25 [in_i] eCW1 ([facility])

 

           Body temperature 98.5 [degF]                       98.5 [degF] eCW1 (

[facility])

 

           Heart rate 62 /min                          62 /min    eCW1 ([facility])

 

           Respiratory rate 18 /min                          18 /min    eCW1 ([facility])

 

           Oxygen saturation in Arterial blood by Pulse oximetry 98 %           

                  98 %       eCW1 ([facility])

 

           Body height 59.25 [in_i]                       59.25 [in_i] eCW1 ([facility])

 

           Heart rate 57 /min                          57 /min    eCW1 ([facility])

 

           Respiratory rate 17 /min                          17 /min    eCW1 ([facility])

 

           Oxygen saturation in Arterial blood by Pulse oximetry 96 %           

                  96 %       eCW1 ([facility])

 

           Respiratory rate 18 /min                          18 /min    eCW1 ([facility])

 

           Oxygen saturation in Arterial blood by Pulse oximetry 98 %           

                  98 %       eCW1 ([facility])

 

           Body height 59.25 [in_i]                       59.25 [in_i] eCW1 ([facility])

 

           Body weight 154.2 [lb_av]                       154.2 [lb_av] eCW1 ([facility])

 

           Body mass index (BMI) [Ratio] 30.88 kg/m2                       30.88

 kg/m2 eCW1 ([facility])

 

           Body temperature 98.0 [degF]                       98.0 [degF] eCW1 (

[facility])

 

           Heart rate 51 /min                          51 /min    eCW1 ([facility])

 

           Body height 59.25 [in_i]                       59.25 [in_i] eCW1 ([facility])

 

           Body temperature 98.5 [degF]                       98.5 [degF] eCW1 (

[facility])

 

           Heart rate 63 /min                          63 /min    eCW1 ([facility])

 

           Respiratory rate 17 /min                          17 /min    eCW1 ([facility])

 

           Oxygen saturation in Arterial blood by Pulse oximetry 94 %           

                  94 %       eCW1 ([facility])

 

           Body height 59.25 [in_i]                       59.25 [in_i] eCW1 ([facility])

 

           Body weight 161.3 [lb_av]                       161.3 [lb_av] eCW1 ([facility])

 

           Body mass index (BMI) [Ratio] 32.30 kg/m2                       32.30

 kg/m2 eCW1 ([facility])

 

           Body temperature 96.7 [degF]                       96.7 [degF] eCW1 (

[facility])

 

           Heart rate 54 /min                          54 /min    eCW1 ([facility])

 

           Respiratory rate 20 /min                          20 /min    eCW1 ([facility])

 

           Oxygen saturation in Arterial blood by Pulse oximetry 96 %           

                  96 %       eCW1 ([facility])

 

           Body height 59.25 [in_i]                       59.25 [in_i] eCW1 ([facility])

 

           Body weight 161.3 [lb_av]                       161.3 [lb_av] eCW1 ([facility])

 

           Body mass index (BMI) [Ratio] 32.30 kg/m2                       32.30

 kg/m2 eCW1 ([facility])

 

           Body temperature 96.7 [degF]                       96.7 [degF] eCW1 (

[facility])

 

           Heart rate 54 /min                          54 /min    eCW1 ([facility])

 

           Respiratory rate 20 /min                          20 /min    eCW1 ([facility])

 

           Oxygen saturation in Arterial blood by Pulse oximetry 96 %           

                  96 %       eCW1 ([facility])

 

           Body height 59.25 [in_i]                       59.25 [in_i] eCW1 ([facility])

 

           Body weight 155 [lb_av]                       155 [lb_av] eCW1 ([facility])

 

           Body mass index (BMI) [Ratio] 31.04 kg/m2                       31.04

 kg/m2 eCW1 ([facility])

 

           Body temperature 98 [degF]                        98 [degF]  eCW1 ([facility])

 

           Heart rate 70 /min                          70 /min    eCW1 ([facility])

 

           Respiratory rate 18 /min                          18 /min    eCW1 ([facility])

 

           Oxygen saturation in Arterial blood by Pulse oximetry 98 %           

                  98 %       eCW1 ([facility])



                                                                                
                  



Patient Treatment Plan of Care

          



             Planned Activity Planned Date Details      Description  Data Source

(s)

 

                          12 HR ranolazine 1000 MG Extended Release Oral Tablet 

[[facility]] 10/19/2021 

12:00:00 AM EDT                                             eCW1 ([facility])

 

             valsartan 160 MG Oral Tablet 2021 12:00:00 AM EST            

               eCW1 ([facility])

 

             valsartan 160 MG Oral Tablet 2021 12:00:00 AM EST            

               eCW1 ([facility])

 

             valsartan 160 MG Oral Tablet 2021 12:00:00 AM EST            

               eCW1 ([facility])

 

             valsartan 160 MG Oral Tablet 2021 12:00:00 AM EST            

               eCW1 ([facility])

 

             valsartan 160 MG Oral Tablet 2021 12:00:00 AM EST            

               eCW1 ([facility])

 

             valsartan 160 MG Oral Tablet 2021 12:00:00 AM EST            

               eCW1 ([facility])

 

             valsartan 160 MG Oral Tablet 2021 12:00:00 AM EST            

               eCW1 ([facility])

 

             valsartan 160 MG Oral Tablet 2021 12:00:00 AM EST            

               eCW1 ([facility])

 

             Fluconazole 150 MG Oral Tablet [Diflucan] 2020 12:00:00 AM ED

T                           eCW1 

([facility])

 

             Nystatin 100 UNT/MG Topical Ointment 2020 12:00:00 AM EDT    

                       eCW1 ([facility])

 

             Fluconazole 150 MG Oral Tablet [Diflucan] 2020 12:00:00 AM ED

T                           eCW1 

([facility])

 

             Nystatin 100 UNT/MG Topical Ointment 2020 12:00:00 AM EDT    

                       eCW1 ([facility])

 

             Fluconazole 150 MG Oral Tablet [Diflucan] 2020 12:00:00 AM ED

T                           eCW1 

([facility])

 

             Nystatin 100 UNT/MG Topical Ointment 2020 12:00:00 AM EDT    

                       eCW1 ([facility])

 

                          1 ML evolocumab 140 MG/ML Prefilled Syringe [Repatha] 

2020 12:00:00 AM EDT

                                                            eCW1 ([facility])

 

                          1 ML evolocumab 140 MG/ML Prefilled Syringe [Repatha] 

2020 12:00:00 AM EDT

                                                            eCW1 ([facility])

 

                          1 ML evolocumab 140 MG/ML Prefilled Syringe [Repatha] 

2020 12:00:00 AM EDT

                                                            eCW1 ([facility])

 

                          1 ML evolocumab 140 MG/ML Prefilled Syringe [Repatha] 

2020 12:00:00 AM EDT

                                                            eCW1 ([facility])

 

                          Acetaminophen 325 MG / Oxycodone Hydrochloride 5 MG Or

al Tablet [Percocet] 

2020 12:00:00 AM EDT                                         eCW1 ([facility])

 

                          12 HR Amoxicillin 1000 MG / Clavulanate 62.5 MG Extend

ed Release Oral Tablet 

2020 12:00:00 AM EDT                                         eCW1 ([facility])

## 2021-10-26 NOTE — CCD
Continuity of Care Document (CCD)

                             Created on: 2021



Eryn Jacobo

External Reference #: [id number]

: 1945

Sex: Female



Demographics





                          Address                   [address]

[place]  [address]

 

                          Home Phone                [phone]

 

                          Preferred Language        Unknown

 

                          Marital Status            Unknown

 

                          Voodoo Affiliation     Unknown

 

                          Race                      White

 

                          Ethnic Group              Not  or 





Author





                          Author                    Eryn BALLARD MD

 

                          Organization              Unknown

 

                          Address                   [address] [place] [address], [address]

[place]  [address]



 

                          Phone                     [phone]







Care Team Providers





                    Care Team Member Name Role                Phone

 

                    Sumaya Martinez PA-C AUT[facility]                [phone]

 

                    José Luis Short MD  AUTM                [phone]

 

                    Tera Morgan MD [facility]M                [phone]

 

                    Halle Fallon RN ANP  [facility]                [phone]







Problems





                    Active Problems     Provider            Date

 

                    History of coronary artery bypass grafting Dennis Ballard MD Onset: 

06/15/2021

 

                          Patient post percutaneous transluminal coronary angiop

lasty Dennis Ballard MD

                                        Onset: 06/15/2021

 

                    Essential hypertension Dennis Ballard MD Onset: 06/15/202

1

 

                    Aortic valve disorder Dennis Ballard MD Onset: 06/15/2021

 

                    Chest pain          Dennis Ballard MD Onset: 06/15/2021

 

                    Mixed hyperlipidemia Dennis Ballard MD Onset: 06/15/2021

 

                    Dietary management surveillance Dennis Ballard MD Onset: 

06/15/2021

 

                          Coronary arteriosclerosis after percutaneous coronary 

angioplasty Dennis Ballard MD                             Onset: 06/15/2021

 

                    Overweight          Dennis Ballard MD Onset: 06/15/2021

 

                    Electrocardiogram abnormal Dennis Ballard MD Onset: 06/15

/2021

 

                          Right bundle branch block AND left anterior fascicular

 block Dennis Ballard MD                                      Onset: 06/15/2021

 

                    Old myocardial infarction Dennis Ballard MD Onset: 06/15/

2021

 

                    Palpitations        Dennis Ballard MD Onset: 06/15/2021

 

                    Syncope and collapse Dennis Ballard MD Onset: 06/15/2021

 

                    Dizziness and giddiness Dennis Ballard MD Onset: 06/15/20

21







Social History





                Type            Date            Description     Comments

 

                Birth Sex                       Unknown          

 

                ETOH Use                        Rarely consumes alcohol  

 

                Tobacco Use     Start: Unknown End: Unknown Patient is a former 

smoker started at 

age 9, at most 1 ppd, quit in  

 

                Smoking Status  Reviewed: 21 Patient is a former smoker st

[place] at age 9, 

at most 1 ppd, quit in  

 

                Exercise Type/Frequency                 Does not exercise curren

tly  

 

                Exercise Limitations                 Back Pain        

 

                Exercise Limitations                 Joint Pain      bilateral a

marjorie and legs 

 

                Exercise Limitations                 Claudication     







Allergies, Adverse Reactions, Alerts





             Active Allergies Criticality  Reaction | Severity Comments     Date

 

             Amoxicillin  Unable to assess criticality                          

 06/15/2021

 

             Atorvastatin Unable to assess criticality                          

 06/15/2021

 

             Tetracycline Unable to assess criticality                          

 06/15/2021

 

             Buspirone    Unable to assess criticality                          

 06/15/2021

 

             Adhesives    Unable to assess criticality                          

 06/15/2021

 

             Butalbital   Unable to assess criticality Hives                    

 2021

 

             Alinia       Unable to assess criticality                          

 06/15/2021

 

             Clindamycin/Lincomycin Unable to assess criticality Diarrhea       

           2021

 

             Carvedilol   Unable to assess criticality              Nausea      

 07/15/2021

 

             Nitazoxanide Unable to assess criticality Hives                    

 2021

 

             Repatha      Unable to assess criticality              Muscle Cramp

s 07/15/2021

 

             Statins      Unable to assess criticality                          

 2021

 

             Sulfamethoxazole / Trimethoprim Unable to assess criticality Rash |

 Mild               

2021







Medications





           Active Medications SIG        Qnty       Indications Ordering Provide

r Date

 

                          Famotidine                     40mg Tablets           

        1 by mouth daily 

at bedtime      30tabs          K21.9           Dennis Ballard MD 2021

 

                    Bystolic                     5mg Tablets                   1

 by mouth every day 

30tabs              I10                 Dennis Ballard MD 2021

 

                          Vascepa                     1gm Capsules              

     2 caps by mouth twice

a day (take with meals) 360caps         E78.2           Dennis Ballard MD 

 

                    Biotin                     2500mcg Capsules                 

  1 by mouth daily     

                                        Unknown             2021

 

                          Valsartan                     80mg Tablets            

       2 by mouth every 

day at bedtime and occasionally 2 by mouth in morning if needed (pt adjusted 
dose)                                           Unknown         2021

 

                          Amlodipine Besylate                     2.5mg Tablets 

                  1 by 

mouth daily only if SBP is <150                                 Selvin Morgan MD 2021

 

                          Ranolazine ER                     500mg Tablets ER 12H

R                   2 by 

mouth twice a day                                 Unknown         2021

 

                          Praluent                     150mg/ml Solution Auto-In

ject                   

inject 1 milliliters subcutaneous every 2 weeks 3ml             E78.2           

Dennis Ballard MD 

2021

 

                    Probiotic                      Capsules                   1 

by mouth every day  

                                        Unknown             2021

 

             Magnesium                     400mg Tablets                   165 m

g daily                           

Unknown                                 2021

 

                          Zinc Chelated                     50mg Tablets        

           1 by mouth once

daily                                           Unknown         2021

 

                          Promethazine HCL                     25mg Tablets     

              1 by mouth 

every 6 hours as needed                                 Unknown         20

21

 

                          Nitroglycerin                     0.6mg/HR Patches 24H

R                   1 

patch applied to skin every day (keep on 14 hours daily then remove) 30units    

               

I25.10                    Dennis Ballard MD      06/15/2021

 

                          Allopurinol                     300mg Tablets         

          1 by mouth every

day                                             Unknown         2021

 

                                        Vitamin D (Ergocalciferol)              

       1.25mg (68613 Ut) Capsules       

             1 by mouth every weekly                           Unknown      

 

                                        Vitamin Deficiency Injectable System-B12

                     1000mcg/ML Kit     

             inject 1 kit once monthly                           Unknown      

 

                          Meclizine HCL                     25mg Chewtabs       

            1 by mouth 

four times daily as needed                                 Unknown         

 

                          Tylenol Extra Strength                     500mg Table

ts                   2 by 

mouth twice daily as needed                                 Unknown         

 

                          Tramadol HCL                     50mg Tablets         

          2 by mouth every

8 hours as needed, as directed                                 Unknown         0

2021

 

                          Aspirin                     325mg Tablets DR          

         1 by mouth every 

day                                             Unknown         2021

 

                          Nitroglycerin                     0.4mg Tablets Sub   

                1 tab sl 

every 5 min times 3 doses as needed chest discomfort                            

     Unknown         2021

 

                          Escitalopram Oxalate                     10mg Tablets 

                  1 by 

mouth every day                                 Unknown         2021

 

                                        Albuterol Sulfate HFA                   

  108(90Base) mcg/Act Aerosol           

             inhale two puffs as needed every 4 hours                           

Unknown      2021

 

                    Plavix                     75mg Tablets                   1 

by mouth every day  

                                        Unknown             2021

 

                                        History Medications

 

                          Metoprolol Tartrate                     25mg Tablets  

                 1 by 

mouth twice a day                 I10             Unknown         2021 - 0

2021

 

                          Amlodipine Besylate                     2.5mg Tablets 

                  1 by 

mouth twice a day (hold dose for systolic BP <135) 180tabs                      

           Dennis Ballard MD                                      07/15/2021 - 2021

 

                          Metoprolol Tartrate                     25mg Tablets  

                 Half 

tablet by mouth twice a day                 I10             Unknown          - 2021

 

                                        Repatha Pushtronex System               

      420mg/3.5ML Solution Cartridge    

              420 mg (3.5 ml) sq qmo 10.5ml       E78.2        Dennis Ballard MD 

06/15/2021 - 2021

 

                          Carvedilol                     3.125mg Tablets        

           1 by mouth 

twice a day     60tabs          I10             Dennis Ballard MD 06/15/2021 

- 2021

 

                                        I25.2

 

                                        R07.9

 

                          Valsartan                     80mg Tablets            

       2 by mouth daily at

bedtime                                         Unknown         2021 - 

 

                          Amlodipine Besylate                     2.5mg Tablets 

                  1 by 

mouth every day as needed for elevated BP                                 Unknow

n         2021 - 07/15/2021

 

                          Febuxostat                     40mg Tablets           

        1 by mouth every 

daily                                           Unknown         2021 - 

 

                          Pantoprazole Sodium                     40mg Tablets D

R                   2 by 

mouth every day                 K21.9           Unknown         2021 - 

 

                          Metoclopramide HCL                     10mg Tablets   

                1 by mouth

four times daily                                 Unknown         2021 - 

 

                          Metoprolol Tartrate                     25mg Tablets  

                 1 by 

mouth twice a day                 I10             Unknown         2021 - 0

6/15/2021

 

                                        I25.2

 

                                        R07.9

 

                          Ranexa                     500mg Tablets ER 12HR      

             2 by mouth 

twice a day                                     Unknown         2021 - 2021

 

                          Covid-19 vaccine, Unspecified                      Inj

ection                    

                                                Unknown         







Immunizations





                                        Description

 

                                        No Information Available







Vital Signs





                Date            Vital           Result          Comment

 

                2021 12:33pm Weight          158.00 lb        

 

                    Home Weight         155lb                

 

                    Height              61 inches           5'1"

 

                    BMI (Body Mass Index) 29.9 kg/m2           

 

                    Heart Rate          52 /min              

 

                    BP Systolic Sitting 170 mmHg            CBP large cuff, Ra

 

                    BP Diastolic Sitting 70 mmHg             CBP large cuff, Ra

 

                07/15/2021 10:44am Weight          155.00 lb        

 

                    Home Weight         153lb               home weight

 

                    Height              61 inches           5'1"

 

                    BMI (Body Mass Index) 29.3 kg/m2           

 

                    Heart Rate          55 /min              

 

                    BP Systolic Sitting 148 mmHg            CBP, adult cuff/Ra

 

                    BP Diastolic Sitting 63 mmHg             CBP, adult cuff/Ra







Results





        Test    Acquired Date Facility Test    Result  H/L     Range   Note

 

                    Lipid Panel         2021          [facility]

nter

           [phone] Triglycerides Level 316 mg/dL  High       <150        

 

             Cholesterol Level 191 mg/dL    Normal       <200          

 

             HDL Cholesterol 43 mg/dL     Normal       >40           

 

             LDL Cholesterol 85 mg/dL     Normal       <100          

 

             Non-HDL-C    148 mg/dL    Normal                     

 

             Cholesterol Risk Ratio 4.441        Normal       <5            

 

                    Laboratory test finding 2021          [facility]

           [phone] Vitamin B12 Level 689 pg/mL  Normal     247-911    1







                          1                         VITAMIN B12 NORMAL RANGE



NORMAL                     247 - 911 PG/ML

INDETERMINATE              211 - 246 PG/ML

DEFICIENT              LESS THAN 211 PG/ML









Procedures





                Date            Code            Description     Status

 

                2021      20064           Office/Outpatient Established Lo

w MDM 20-29 Min Completed

 

                09/10/2021      58621           External ECG Rec>48HR<7D Review 

& Interpretation Completed

 

                09/10/2021      32487           External ECG Rec>48HR<7D Recordi

ng Completed

 

                2021      66962           Echocardiogram 2-D Doppler Color

 Completed

 

                2021      56283           Complex Chronic Care MGMT Servic

e Ea Addl 30 Min Completed

 

                2021      45399           Complex Chronic Care Management 

SVC 1St 60 Min Completed

 

                08/10/2021      57557           TM Interpretation & Report Only 

Completed

 

                08/10/2021      94439           Myocardial Imaging (PET) Multipl

e Studies Completed

 

                07/15/2021      84742           Office/Outpatient Established Lo

w MDM 20-29 Min Completed

 

                    07/15/2021          77887               Arterial Pressure Wa

veform Analysis For Assessment Of Central 

Art                                     Completed

 

                06/15/2021      96072           Office/Outpatient New Moderate M

DM 45-59 Minutes Completed

 

                    06/15/2021          90682               Arterial Pressure Wa

veform Analysis For Assessment Of Central 

Art                                     Completed

 

                06/15/2021      13118           ECG 12-Lead     Completed







[employer] Devices





                                        Description

 

                                        No Information Available







Encounters





           Type       Date       Location   Provider   Dx         Diagnosis

 

           Office Visit 2021 12:15p Main Office Dennis Ballard MD I25.1

0     [facility] 

heart disease of native coronary artery w/o ang pctrs

 

                          I10                       Essential (primary) hyperten

ayse

 

                          E78.2                     Mixed hyperlipidemia

 

           Office Visit 2021  8:27a Main Office Dennis Ballard MD I10  

      Essential 

(primary) hypertension

 

                          I25.10                    Athscl heart disease of noemi

ve coronary artery w/o ang pctrs

 

                          E78.2                     Mixed hyperlipidemia

 

           Office Visit 07/15/2021 11:00a Main Office Dennis Ballard MD I25.1

0     Athscl 

heart disease of native coronary artery w/o ang pctrs

 

                          I10                       Essential (primary) hyperten

ayse

 

           Office Visit 06/15/2021 12:00p Main Office Dennis Ballard MD I25.1

0     Athscl 

heart disease of native coronary artery w/o ang pctrs

 

                          I25.2                     Old myocardial infarction

 

                          Z95.1                     Presence of aortocoronary by

pass graft

 

                          Z95.5                     Presence of coronary angiopl

asty implant and graft

 

                          R07.9                     Chest pain, unspecified

 

                          I10                       Essential (primary) hyperten

ayse

 

                          I35.0                     Nonrheumatic aortic (valve) 

stenosis

 

                          R94.31                    Abnormal electrocardiogram [

ECG] [EKG]

 

                          R55                       Syncope and collapse

 

                          I45.2                     Bifascicular block

 

                          R00.2                     Palpitations

 

                          R42                       Dizziness and giddiness

 

                          E78.2                     Mixed hyperlipidemia

 

                          E66.3                     Overweight

 

                          Z71.3                     Dietary counseling and surve

illance







Assessments





                Date            Code            Description     Provider

 

                    2021          I25.10              Atherosclerotic hear

t disease of native coronary artery 

without angina pectoris                 Dennis Ballard MD

 

                2021      I10             Essential (primary) hypertension

 Dennis Ballard MD

 

                2021      E78.2           Mixed hyperlipidemia Dennis soria MD

 

                09/10/2021      R42             Dizziness and giddiness Holter/E

vent/Telemetry

 

                09/10/2021      R00.2           Palpitations    Holter/Event/Tel

emetry

 

                2021      I35.0           Nonrheumatic aortic (valve) sten

osis ECHO

 

                2021      I10             Essential (primary) hypertension

 Dennis Ballard MD

 

                    2021          I25.10              Atherosclerotic hear

t disease of native coronary artery 

without angina pectoris                 Dennis Ballard MD

 

                2021      E78.2           Mixed hyperlipidemia Dennis soria MD

 

                    08/10/2021          I25.10              Atherosclerotic hear

t disease of native coronary artery 

without angina pectoris                 Cardiac PET

 

                    07/15/2021          I25.10              Atherosclerotic hear

t disease of native coronary artery 

without angina pectoris                 Dennis Ballard MD

 

                07/15/2021      I10             Essential (primary) hypertension

 Dennis Ballard MD

 

                    06/15/2021          I25.10              Atherosclerotic hear

t disease of native coronary artery 

without angina pectoris                 Dennis Ballard MD

 

                06/15/2021      I25.2           Old myocardial infarction Dennis Ballard MD

 

                06/15/2021      Z95.1           Presence of aortocoronary bypass

 graft Dennis Ballard MD

 

                06/15/2021      Z95.5           Presence of coronary angioplasty

 implant and graft Dennis Ballard MD

 

                06/15/2021      R07.9           Chest pain, unspecified Dennis Ballard MD

 

                06/15/2021      I10             Essential (primary) hypertension

 Dennis Ballard MD

 

                06/15/2021      I35.0           Nonrheumatic aortic (valve) sten

osis Dennis Ballard MD

 

                06/15/2021      R94.31          Abnormal electrocardiogram [ECG]

 [EKG] Dennis Ballard MD

 

                06/15/2021      R55             Syncope and collapse Dennis soria MD

 

                06/15/2021      I45.2           Bifascicular block Dennis henderson MD

 

                06/15/2021      R00.2           Palpitations    Dennis Ballard MD

 

                06/15/2021      R42             Dizziness and giddiness Dennis Ballard MD

 

                06/15/2021      E78.2           Mixed hyperlipidemia Dennis soria MD

 

                06/15/2021      E66.3           Overweight      Dennis Ballard MD

 

                06/15/2021      Z71.3           Dietary counseling and surveilla

nce Dennis Ballard MD







Plan of Treatment

Future Appointment(s):* 2022  1:00 pm - Dennis Ballard MD at Main 
  Office

2021 - Dennis Ballard MD* I25.10 Atherosclerotic heart disease of 
  native coronary artery without angina pectoris

* I10 Essential (primary) hypertension* New Medication:* Bystolic 5 mg - 1 by 
  mouth every day





* E78.2 Mixed hyperlipidemia* New Medication:* Vascepa 1 gm - 2 caps by mouth 
  twice a day (take with meals)





* All * Follow up:* Clinic visit in 12 weeks with Dr. Ballard.









Functional Status





                Functional Condition Comment         Date            Status

 

                Independent with all ADL's                                 Activ

e







Mental Status





                                        Description

 

                                        No Information Available







Referrals





                                        Description

 

                                        No Information Available

## 2021-10-26 NOTE — CCD
Summarization of Episode Note

                             Created on: 2021



Dr. LORI JACOBO

External Reference #: [id number]

: 1945

Sex: Female



Demographics





                          Address                   [place] 

[place]  [address]

 

                          Home Phone                [phone]

 

                          Preferred Language        Unknown

 

                          Marital Status            Unknown

 

                          Tenriism Affiliation     Unknown

 

                          Race                      White

 

                          Ethnic Group              Not  or 





Author





                          Author                    [facility]

ems

 

                          Organization              [facility] [address]

ems

 

                          Address                   Unknown

 

                          Phone                     Unavailable







Support





                Name            Relationship    Address         Phone

 

                    LORI JACOBO                [address] 

[place]  [address]                  [phone]

 

                Trevin Jacob[place]  [address] [phone]







Care Team Providers





                    Care Team Member Name Role                Phone

 

                    Kailee West        Unavailable         [phone]







PROBLEMS





          Type      Condition ICD9-CM Code DSG38-XU Code Onset Dates Condition S

tatus W/U 

Status              Risk                SNOMED Code         Notes

 

       Problem Obstructive sleep apnea        G47.33        Active confirmed    

    [id number]  

 

       Problem Anxiety        F41.9         Active confirmed        [id number] She

 is on Lexapro therapy 

at 20 g daily but she feels that is too strong. She will cut back to 10 mg 
daily.

 

       Problem Essential hypertension        I10           Active confirmed     

   [id number]  

 

                          Problem                   Coronary artery disease of b

ypass graft of native heart with stable 

angina pectoris         I25.708         Active  confirmed         [id number] She 

has coronary artery

disease with a history of interventions. She is complaining of dyspnea on 
exertion. She does not desaturate with ambulation. She is on a beta blocker, 
ARB, Ranexa, aspirin and Plavix therapy. She indicates that she had a stress 
test within the last year or so. I've asked her cardiologist to consider 
reassessment of her coronary anatomy and pulmonary pressures in the near future.

 

       Problem Lumbosacral spondylolysis        M43.07        Active confirmed  

      [id number]  

 

       Problem Sleep apnea in adult        G47.30        Active confirmed       

 [id number]  

 

                          Problem                   Hypertensive heart and chron

ic kidney disease with heart failure and 

stage 1 through stage 4 chronic kidney disease, or unspecified chronic kidney 
disease         I13.0           Active  confirmed         [id number] She did not 

do well with a change

from her valsartan and hydrochlorothiazide to irbesartan therapy and is back on 
the former with reasonable blood pressure control. I think the 
hydrochlorothiazide was stopped because of her chronic renal disease/solitary 
kidney but I doubt that the low dose of the thiazide has substantially affected 
her kidney function. She will keep her nephrology follow-up.

 

          Problem   Gastro-esophageal reflux disease with esophagitis           

K21.0               Active    

confirmed                               [id number]            

 

          Problem   Other osteoarthritis of spine, lumbar region           M47.8

96             Active    

confirmed                               [id number]            

 

        Problem Stenosis of left carotid artery         I65.22          Active  

confirmed         

[id number]                          

 

       Problem Coronary arteriosclerosis        I25.10        Active confirmed  

      22676469  

 

          Problem   DDD (degenerative disc disease), lumbosacral           M51.3

7              Active    confirmed

                                        22189658             

 

                Problem         Chronic gout due to renal impairment without top

hus, unspecified site                 

M1A.30X0              Active     confirmed             [id number]  

 

       Problem Mixed hyperlipidemia        E78.2         Active confirmed       

 460235122  

 

       Problem Fibromyalgia        M79.7         Active confirmed        1576229

05  

 

        Problem Hx of malignant neoplasm of kidney         Z85.528         Activ

e  confirmed         

[id number]                                

 

       Problem Amaurosis fugax, both eyes        G45.3         Active confirmed 

       [id number]  

 

       Problem Vitamin D deficiency        E55.9         Active confirmed       

 85702339  

 

          Problem   History of non anemic vitamin B12 deficiency           Z86.3

9              Active    confirmed

                                        [id number]            

 

        Problem PVD (peripheral vascular disease)         I73.9           Active

  confirmed         041034675

                                         

 

       Problem Other chronic pain        G89.29        Active confirmed        8

0076674  

 

          Problem   Recurrent colitis due to Clostridium difficile           A04

.71              Active    

confirmed                               495695154            

 

           Problem    Malignant neoplasm of urinary bladder, unspecified site   

         C67.9                 Active

                confirmed                       805273820        

 

       Problem Dizziness        R42           Active confirmed        730720281 

 

 

                Problem         Chronic congestive heart failure, unspecified he

art failure type                 I50.9

                      Active     confirmed             40707694    

 

       Problem Solitary kidney, acquired        Z90.5         Active confirmed  

      112730610  

 

       Problem Hypercholesterolemia        E78.00        Active confirmed       

 46047499  







ALLERGIES





                    Allergen (clinical drug ingredient) Drug/Non Drug Allergy do

cumented on EMR 

Reaction            Allergy Type        Onset Date          Status

 

           tetracycline Tetracycline HCl(NDC Code:[address]-95) diarrhea   Drug

 Allergy            

Active

 

                      BuSpar     Unknown    Drug Allergy            Active

 

                      Minocins   diarrhea   Non Drug Allergy            Active

 

           nitazoxanide Alinia(NDC Code:[address]-14) Hives      Drug Allergy  

          Active

 

                      nitrostat  Anaphylaxis Non Drug Allergy            Active

 

           evolocumab Repatha(NDC Code:[address]-05) Rash       Drug Allergy   

         Active

 

                      Statins (for Allergy Use Only) muscle soreness weakness Dr

ug Allergy            Active

 

           carvedilol Carvedilol(NDC Code:[address]-21) Unknown    Drug Allergy

            Active

 

                      Codeine Phosphate (For Allergies Use Only) Hives      Drug

 Allergy            Active

 

                      Butabarbital Sodium Hives      Drug Allergy            Act

juany

 

                sulfamethoxazole / trimethoprim Bactrim(NDC Code:[address]-38) 

Rash            Drug 

Allergy                                             Active

 

                      Trental    sick       Drug Allergy            Active

 

                      Adhesive Tape Hives      Drug Allergy            Active







ENCOUNTERS from 1945 to 2021





             Encounter    Location     Date         Provider     Diagnosis

 

                    [facility]          [facility] [address] [facility] [phone] [place] [address] 31 Aug, 2021

                          Kailee West                 







IMMUNIZATIONS





                Vaccine         Route           Administration Date Status

 

                Influenza 18 yrs & older Flublok IM Intramuscular Dec 20, 2018  

  Administered

 

                Vitamin B-12 1000mcg/1mL Cyanocobalamin IM Intramuscular Aug 28,

 2019    

Administered

 

                Pneumococcal Adult 0.5mL Pneumovax 23 Unknown         

013   Administered

 

                Influenza 6mo & up Fluzone Unknown         Dec 08, 2015    Refus

ed







SOCIAL HISTORY

Tobacco Use:



                    Social History Observation Description         Date

 

                    Details (start date - stop date) Never Smoker         



Sex Assigned At Birth:



                          Social History Observation Description

 

                          Sex Assigned At Birth     Unknown



Education:



                    Question            Answer              Notes

 

                    Level of Education: Not finished High School  



Audit



                    Question            Answer              Notes

 

                    Total Score:        0                    

 

                    Interpretation:     Alcohol Education    



Language:



                    Question            Answer              Notes

 

                    Languages spoken:   English              



Hoahaoism:



                    Question            Answer              Notes

 

                    [employer]ah[employer]ism            [employer]         



Sexual Hx:



                    Question            Answer              Notes

 

                    Had sex in the last 12 months (vaginal, oral, or anal)? No  

                 

 

                    Have you ever had an STD? No                   



Drug and Alcohol



                    Question            Answer              Notes

 

                    Total Score:        0                    

 

                    Interpretation:     No problems reported  



Alcohol Screening:



                    Question            Answer              Notes

 

                    Did you have a drink containing alcohol in the past year? No

                   

 

                    Points              0                    

 

                    Interpretation      Negative             



Tobacco Use:



                    Question            Answer              Notes

 

                    Are you a:          never smoker        former smoker

 

                    Additional Findings: Tobacco User                     quit 5

 years ago







REASON FOR REFERRAL

No Information



VITAL SIGNS

No information



MEDICATIONS





           Medication SIG (Take, Route, Frequency, Duration) Notes      Start Da

te End Date   

Status

 

                          Nitroglycerin 0.4 MG/HR   1 patch to skin Transdermal 

Daily as needed per 

                      23 Oct, 2018                    Active

 

           Metoprolol Tartrate 25 mg 1 tab Orally twice daily                   

               Active

 

           Acetaminophen 500 MG 1 tablet as needed Orally every 6 hrs           

                       Active

 

           Lexapro 10 MG 1 tab Orally Once a day                                

  Active

 

           Benadryl 25 MG 1 capsule Orally every 12 hours as needed             

                     Active

 

           Ranexa 1000 MG 1 tablet Orally Twice a day for 90 day(s)             

                     Active

 

           Zinc 50 MG 1 tablet Orally Once a day for 30 day(s)                  

                Active

 

           Allopurinol 300 MG 1 tablet Orally Once a day                        

          Active

 

           Promethazine HCl 25 mg 1 tablet Orally before bedtime as needed      

                            Active

 

           Losartan Potassium-HCTZ 50-12.5 MG 1 tablet Orally Once a day for 90 

days                                  

Not-Taking

 

                          Amlodipine Besylate 2.5 MG TAKE 1TAB.BY MOUTH TWICE A 

DAY  HOLD DOSE FOR 

SYSTOLIC BLOOD PRESSURE   135 Oral Takeif B/P is over 160 systolic              

                                   Active

 

           Pantoprazole Sodium 40 MG 1 tablet Orally Once a day for 30 day(s)   

                               Active

 

           Magnesium 65 MG 2 tablets with a meal Orally Once a day              

                    Active

 

                          Nitroglycerin 0.4 MG      1 tab Sublingual atonset of 

chest pain q 5 min x 3 if no 

relief call 911                                                 Active

 

           Plavix 75 mg 1 tablet Orally Once a day for 90 days                  

                Active

 

           Aspirin 325 MG 1 tablet Orally Once a day                            

      Active

 

           Valsartan 80 MG 2 tablet Orally                                  Acti

ve

 

           Probiotic - 1 capsule Orally Daily                                  A

ctive

 

           Vitamin B12 3000 MCG 1 tablet Sublingual Once a day                  

                Active

 

                          Meclizine HCl 25 mg       1 tablet Orally every 8 hour

s as needed for dizziness for 30

day(s)                          27 Dec, 2018                    Active

 

                          May Have -                please provide new tubing fo

r 2 concentrator. DX code:G47.33 to use 

daily for 99 months                 11 Sep, 2018                    Active

 

           Ranitidine 150 Max Strength 150 MG 1 tablet Orally twice daily       

                           Not-Taking

 

                          traMADol HCl 50 MG        1 tab orally/[id number] three

 times daily as needed for pain 

mdd3 for 30 days                                                 Active

 

           Vitamin D3 125 MCG (5000 UT) 1 capsule Orally weekly                 

                 Active







PROCEDURES

No Information



RESULTS

No Results



REASON FOR VISIT

MEDICATION RECONCILLIATION



MEDICAL (GENERAL) HISTORY





                    Type                Description         Date

 

                    Medical History     No MRI's due to stents in legs and heart

  

 

                          Medical History           Carotid Artery Disease nonco

nclusive Lexiscan cardiolite stress 

test, normal myocardial perfusion, preserved LV function 2016 Hx MI, Dr Guerrero                                   

 

                    Medical History     peripheral vascular disease (carotids, l

ower extremities)  

 

                    Medical History     CVA                  

 

                    Medical History     Hypercholesterolemia  

 

                    Medical History     Bradycardia          

 

                    Medical History     congestive heart failure ? type  

 

                    Medical History     essential hypertension  

 

                    Medical History     syncopal event x1 13  

 

                    Medical History     Migraines            

 

                          Medical History           Sleep apneauses oxygen at 2 

liters q hour sleep does not toerate

cpap                                     

 

                    Medical History     GERD                 

 

                    Medical History     Anxiety/Depression   

 

                    Medical History     fibromyalgia         

 

                    Medical History     colonoscopy 2016 with hemorrhoids a

nd diverticulosis  

 

                          Medical History           papillary urothelial carcino

ma cystoscopy 2018 Dr. Dom Juarez negative cystoscopy 2017  

 

                          Medical History           recurrent C. difficile colit

is C. difficile positive on 18 

negative on 3/8/18 positive on 17 positive on 2013  

 

                    Medical History     degenerative disc disease and spondylosi

s  

 

                    Medical History     hgac  5.2=103    , hga1c 5 2019 

 

 

                    Medical History     Solitary Kidney right kidney  ( left rem

lorena from cancer)  

 

                    Surgical History    triple bypass       

 

                    Surgical History    multiple procedures with cardiac stents 

 [facility] [phone]

 

                    Surgical History    back surgery        

 

                    Surgical History    stent right leg     

 

                    Surgical History    right nephrectomy [place]  [facility] 

 

                    Surgical History    lumbar 4-5m         

 

                    Surgical History    colon resection-[place]  [facility] 3/2018

 

                    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

 

                    Hospitalization History blood transfusion   

 

                    Hospitalization History Kidney stone         

 

                    Hospitalization History for surgeries        







Goals Section

No Information



Health Concerns

No Information



MEDICAL EQUIPMENT

No Information



MENTAL STATUS

No Information



FUNCTIONAL STATUS

No Information



ASSESSMENTS

No Information



PLAN OF TREATMENT

No Information



Insurance Providers





             Payer Name   Payer Address Payer Phone  Insured Name Patient Relati

onship to 

Insured                   Coverage Start Date       Coverage End Date

 

                MEDICARE Part A and B [address]  [facility] [id number]

[phone]    

LORI JACOBO                                     

 

                          [facility] HE[employer] CARE [facility] CLAIM DIV 

[address] [facility] 

[address]   [phone] LORI JACOBO

## 2021-10-26 NOTE — CCD
Summarization of Episode Note

                             Created on: 2021



Dr. LORI JACOBO

External Reference #: [id number]

: 1945

Sex: Female



Demographics





                          Address                   [address] 

[place]  [address]

 

                          Home Phone                [phone]

 

                          Preferred Language        Unknown

 

                          Marital Status            Unknown

 

                          Tenriism Affiliation     Unknown

 

                          Race                      White

 

                          Ethnic Group              Not  or 





Author





                          Author                    [facility]

ems

 

                          Organization              [facility]

ems

 

                          Address                   Unknown

 

                          Phone                     Unavailable







Support





                Name            Relationship    Address         Phone

 

                    LORI JACOBO                [address] 

[place]  [address]                  [phone]

 

                Trevin Jacobo   [place]  [address] [phone]







Care Team Providers





                    Care Team Member Name Role                Phone

 

                    Kailee West        Unavailable         [phone]







PROBLEMS





          Type      Condition ICD9-CM Code BKE08-PL Code Onset Dates Condition S

tatus W/U 

Status              Risk                SNOMED Code         Notes

 

       Problem Obstructive sleep apnea        G47.33        Active confirmed    

    [id number]  

 

       Problem Anxiety        F41.9         Active confirmed        [id number] She

 is on Lexapro therapy 

at 20 g daily but she feels that is too strong. She will cut back to 10 mg 
daily.

 

       Problem Essential hypertension        I10           Active confirmed     

   [id number]  

 

                          Problem                   Coronary artery disease of b

ypass graft of native heart with stable 

angina pectoris         I25.708         Active  confirmed         [id number] She 

has coronary artery

disease with a history of interventions. She is complaining of dyspnea on 
exertion. She does not desaturate with ambulation. She is on a beta blocker, 
ARB, Ranexa, aspirin and Plavix therapy. She indicates that she had a stress 
test within the last year or so. I've asked her cardiologist to consider 
reassessment of her coronary anatomy and pulmonary pressures in the near future.

 

       Problem Lumbosacral spondylolysis        M43.07        Active confirmed  

      [id number]  

 

       Problem Sleep apnea in adult        G47.30        Active confirmed       

 [id number]  

 

                          Problem                   Hypertensive heart and chron

ic kidney disease with heart failure and 

stage 1 through stage 4 chronic kidney disease, or unspecified chronic kidney 
disease         I13.0           Active  confirmed         [id number] She did not 

do well with a change

from her valsartan and hydrochlorothiazide to irbesartan therapy and is back on 
the former with reasonable blood pressure control. I think the 
hydrochlorothiazide was stopped because of her chronic renal disease/solitary 
kidney but I doubt that the low dose of the thiazide has substantially affected 
her kidney function. She will keep her nephrology follow-up.

 

          Problem   Gastro-esophageal reflux disease with esophagitis           

K21.0               Active    

confirmed                               [id number]            

 

          Problem   Other osteoarthritis of spine, lumbar region           M47.8

96             Active    

confirmed                               [id number]            

 

        Problem Stenosis of left carotid artery         I65.22          Active  

confirmed         

[id number]                          

 

       Problem Coronary arteriosclerosis        I25.10        Active confirmed  

      13252828  

 

          Problem   DDD (degenerative disc disease), lumbosacral           M51.3

7              Active    confirmed

                                        [id number]             

 

                Problem         Chronic gout due to renal impairment without top

hus, unspecified site                 

M1A.30X0              Active     confirmed             [id number]  

 

       Problem Mixed hyperlipidemia        E78.2         Active confirmed       

 646270182  

 

       Problem Fibromyalgia        M79.7         Active confirmed        9034690

05  

 

        Problem Hx of malignant neoplasm of kidney         Z85.528         Activ

e  confirmed         

[id number]                                

 

       Problem Amaurosis fugax, both eyes        G45.3         Active confirmed 

       45898917  

 

       Problem Vitamin D deficiency        E55.9         Active confirmed       

 28783885  

 

          Problem   History of non anemic vitamin B12 deficiency           Z86.3

9              Active    confirmed

                                        [id number]            

 

        Problem PVD (peripheral vascular disease)         I73.9           Active

  confirmed         [id number]

                                         

 

       Problem Other chronic pain        G89.29        Active confirmed        8

0868563  

 

          Problem   Recurrent colitis due to Clostridium difficile           A04

.71              Active    

confirmed                               [id number]            

 

           Problem    Malignant neoplasm of urinary bladder, unspecified site   

         C67.9                 Active

                confirmed                       119442326        

 

       Problem Dizziness        R42           Active confirmed        678249256 

 

 

                Problem         Chronic congestive heart failure, unspecified he

art failure type                 I50.9

                      Active     confirmed             18186454    

 

       Problem Solitary kidney, acquired        Z90.5         Active confirmed  

      532402371  

 

       Problem Hypercholesterolemia        E78.00        Active confirmed       

 04510549  







ALLERGIES





                    Allergen (clinical drug ingredient) Drug/Non Drug Allergy do

cumented on EMR 

Reaction            Allergy Type        Onset Date          Status

 

           tetracycline Tetracycline HCl(NDC Code:[address]-04) diarrhea   Drug

 Allergy            

Active

 

                      BuSpar     Unknown    Drug Allergy            Active

 

                      Minocins   diarrhea   Non Drug Allergy            Active

 

           nitazoxanide Alinia(NDC Code:[address]-03) Hives      Drug Allergy  

          Active

 

                      nitrostat  Anaphylaxis Non Drug Allergy            Active

 

           evolocumab Repatha(NDC Code:[address]-43) Rash       Drug Allergy   

         Active

 

                      Statins (for Allergy Use Only) muscle soreness weakness Dr

ug Allergy            Active

 

           carvedilol Carvedilol(NDC Code:[address]-91) Unknown    Drug Allergy

            Active

 

                      Codeine Phosphate (For Allergies Use Only) Hives      Drug

 Allergy            Active

 

                      Butabarbital Sodium Hives      Drug Allergy            Act

juany

 

                sulfamethoxazole / trimethoprim Bactrim(NDC Code:[address]-76) 

Rash            Drug 

Allergy                                             Active

 

                      Trental    sick       Drug Allergy            Active

 

                      Adhesive Tape Hives      Drug Allergy            Active







ENCOUNTERS from 1945 to 2021





             Encounter    Location     Date         Provider     Diagnosis

 

                    [place] [id number] [facility] [phone] [place] [address] 25 Aug, 2021

                          Kailee West                PVD (peripheral vascular dis

ease) I73.9 and Anxiety F41.9







IMMUNIZATIONS





                Vaccine         Route           Administration Date Status

 

                Influenza 18 yrs & older Flublok IM Intramuscular Dec 20, 2018  

  Administered

 

                Vitamin B-12 1000mcg/1mL Cyanocobalamin IM Intramuscular Aug 28,

 2019    

Administered

 

                Pneumococcal Adult 0.5mL Pneumovax 23 Unknown         

013   Administered

 

                Influenza 6mo & up Fluzone Unknown         Dec 08, 2015    Refus

ed







SOCIAL HISTORY

Tobacco Use:



                    Social History Observation Description         Date

 

                    Details (start date - stop date) Never Smoker         



Sex Assigned At Birth:



                          Social History Observation Description

 

                          Sex Assigned At Birth     Unknown



Education:



                    Question            Answer              Notes

 

                    Level of Education: Not finished High School  



Audit



                    Question            Answer              Notes

 

                    Total Score:        0                    

 

                    Interpretation:     Alcohol Education    



Language:



                    Question            Answer              Notes

 

                    Languages spoken:   English              



Mu-ism:



                    Question            Answer              Notes

 

                    Mu-ism            08 Caodaism         



Sexual Hx:



                    Question            Answer              Notes

 

                    Had sex in the last 12 months (vaginal, oral, or anal)? No  

                 

 

                    Have you ever had an STD? No                   



Drug and Alcohol



                    Question            Answer              Notes

 

                    Total Score:        0                    

 

                    Interpretation:     No problems reported  



Alcohol Screening:



                    Question            Answer              Notes

 

                    Did you have a drink containing alcohol in the past year? No

                   

 

                    Points              0                    

 

                    Interpretation      Negative             



Tobacco Use:



                    Question            Answer              Notes

 

                    Are you a:          never smoker        former smoker

 

                    Additional Findings: Tobacco User                     quit 5

 years ago







REASON FOR REFERRAL

No Information



VITAL SIGNS

No information



MEDICATIONS





           Medication SIG (Take, Route, Frequency, Duration) Notes      Start Da

te End Date   

Status

 

                          Nitroglycerin 0.4 MG/HR   1 patch to skin Transdermal 

Daily as needed per 

                      23 Oct, 2018                    Active

 

           Metoprolol Tartrate 25 mg 1 tab Orally twice daily                   

               Active

 

           Acetaminophen 500 MG 1 tablet as needed Orally every 6 hrs           

                       Active

 

           Lexapro 10 MG 1 tab Orally Once a day                                

  Active

 

           Benadryl 25 MG 1 capsule Orally every 12 hours as needed             

                     Active

 

           Ranexa 1000 MG 1 tablet Orally Twice a day for 90 day(s)             

                     Active

 

           Zinc 50 MG 1 tablet Orally Once a day for 30 day(s)                  

                Active

 

           Allopurinol 300 MG 1 tablet Orally Once a day                        

          Active

 

           Promethazine HCl 25 mg 1 tablet Orally before bedtime as needed      

                            Active

 

           Losartan Potassium-HCTZ 50-12.5 MG 1 tablet Orally Once a day for 90 

days                                  

Not-Taking

 

                          Amlodipine Besylate 2.5 MG TAKE 1TAB.BY MOUTH TWICE A 

DAY  HOLD DOSE FOR 

SYSTOLIC BLOOD PRESSURE   135 Oral Takeif B/P is over 160 systolic              

                                   Active

 

           Pantoprazole Sodium 40 MG 1 tablet Orally Once a day for 30 day(s)   

                               Active

 

           Magnesium 65 MG 2 tablets with a meal Orally Once a day              

                    Active

 

                          Nitroglycerin 0.4 MG      1 tab Sublingual atonset of 

chest pain q 5 min x 3 if no 

relief call 911                                                 Active

 

           Plavix 75 mg 1 tablet Orally Once a day for 90 days                  

                Active

 

           Aspirin 325 MG 1 tablet Orally Once a day                            

      Active

 

           Valsartan 80 MG 2 tablet Orally                                  Acti

ve

 

           Probiotic - 1 capsule Orally Daily                                  A

ctive

 

           Vitamin B12 3000 MCG 1 tablet Sublingual Once a day                  

                Active

 

                          Meclizine HCl 25 mg       1 tablet Orally every 8 hour

s as needed for dizziness for 30

day(s)                          27 Dec, 2018                    Active

 

                          May Have -                please provide new tubing fo

r 2 concentrator. DX code:G47.33 to use 

daily for 99 months                 11 Sep, 2018                    Active

 

           Ranitidine 150 Max Strength 150 MG 1 tablet Orally twice daily       

                           Not-Taking

 

                          traMADol HCl 50 MG        1 tab orally/[id number] three

 times daily as needed for pain 

mdd3 for 30 days                                                 Active

 

           Vitamin D3 125 MCG (5000 UT) 1 capsule Orally weekly                 

                 Active







PROCEDURES

No Information



RESULTS

No Results



REASON FOR VISIT

refills 



MEDICAL (GENERAL) HISTORY





                    Type                Description         Date

 

                    Medical History     No MRI's due to stents in legs and heart

  

 

                          Medical History           Carotid Artery Disease nonco

nclusive Lexiscan cardiolite stress 

test, normal myocardial perfusion, preserved LV function 2016 Hx MI, Dr Guerrero                                   

 

                    Medical History     peripheral vascular disease (carotids, l

ower extremities)  

 

                    Medical History     CVA                  

 

                    Medical History     Hypercholesterolemia  

 

                    Medical History     Bradycardia          

 

                    Medical History     congestive heart failure ? type  

 

                    Medical History     essential hypertension  

 

                    Medical History     syncopal event x1 13  

 

                    Medical History     Migraines            

 

                          Medical History           Sleep apneauses oxygen at 2 

liters q hour sleep does not toerate

cpap                                     

 

                    Medical History     GERD                 

 

                    Medical History     Anxiety/Depression   

 

                    Medical History     fibromyalgia         

 

                    Medical History     colonoscopy 2016 with hemorrhoids a

nd diverticulosis  

 

                          Medical History           papillary urothelial carcino

ma cystoscopy 2018 Dr. Dom Juarez negative cystoscopy 2017  

 

                          Medical History           recurrent C. difficile colit

is C. difficile positive on 18 

negative on 3/8/18 positive on 17 positive on 2013  

 

                    Medical History     degenerative disc disease and spondylosi

s  

 

                    Medical History     hgac  5.2=103    , hga1c 5 2019 

 

 

                    Medical History     Solitary Kidney right kidney  ( left rem

lorena from cancer)  

 

                    Surgical History    triple bypass       

 

                    Surgical History    multiple procedures with cardiac stents 

 st [id number]

 

                    Surgical History    back surgery        

 

                    Surgical History    stent right leg     

 

                    Surgical History    right nephrectomy [facility] 

 

                    Surgical History    lumbar 4-5m         1974

 

                    Surgical History    colon resection-[facility] 3/2018

 

                    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

 

                    Hospitalization History blood transfusion   

 

                    Hospitalization History Kidney stone         

 

                    Hospitalization History for surgeries        







Goals Section

No Information



Health Concerns

No Information



MEDICAL EQUIPMENT

No Information



MENTAL STATUS

No Information



FUNCTIONAL STATUS

No Information



ASSESSMENTS





             Encounter Date Diagnosis    Assessment Notes Treatment Notes Treatm

ent Clinical 

Notes

 

                25 Aug, 2021    PVD (peripheral vascular disease) (ICD-10 - I73.

9)                 



                                        





 

                25 Aug, 2021    Anxiety (ICD-10 - F41.9)                 



                                        











PLAN OF TREATMENT

No Information



Insurance Providers





             Payer Name   Payer Address Payer Phone  Insured Name Patient Relati

onship to 

Insured                   Coverage Start Date       Coverage End Date

 

                          [employer]  [facility] CLAIM DIV 

[address] [facility] 

[id number] [phone] LORI JACOBO                       

 

                MEDICARE Part A and B [address]  [facility] [id number]

[phone]    

LORI JACOBO

## 2021-10-26 NOTE — CCD
Continuity of Care Document (CCD)

                             Created on: 2021



Eryn Jacobo

External Reference #: [id number]

: 1945

Sex: Female



Demographics





                          Address                   [address]

[place] [id number]

 

                          Home Phone                [phone]

 

                          Preferred Language        Unknown

 

                          Marital Status            Unknown

 

                          Judaism Affiliation     Unknown

 

                          Race                      White

 

                          Ethnic Group              Not  or 





Author





                          Author                    Eryn BALLARD MD

 

                          Organization              Unknown

 

                          Address                   [address] [place] [address], [address]

[place]  [address]



 

                          Phone                     [phone]







Care Team Providers





                    Care Team Member Name Role                Phone

 

                    Sumaya Martinez PA-C [facility]                [phone]

 

                    José Luis Short MD  AUTM                [phone]

 

                    Tera Morgan MD [facility]                [phone]

 

                    Halle Fallon RN ANP  [facility]                [phone]

 

                    Abhay Mario MD AUT[facility]                [phone]







Problems





                    Active Problems     Provider            Date

 

                    History of coronary artery bypass grafting Dennis Ballard MD Onset: 

06/15/2021

 

                          Patient post percutaneous transluminal coronary angiop

lasty Dennis Ballard MD

                                        Onset: 06/15/2021

 

                    Essential hypertension Dennis Ballard MD Onset: 06/15/202

1

 

                    Aortic valve disorder Dennis Ballard MD Onset: 06/15/2021

 

                    Chest pain          Dennis Ballard MD Onset: 06/15/2021

 

                    Mixed hyperlipidemia Dennis Ballard MD Onset: 06/15/2021

 

                    Dietary management surveillance Dennis Ballard MD Onset: 

06/15/2021

 

                          Coronary arteriosclerosis after percutaneous coronary 

angioplasty Dennis Ballard MD                             Onset: 06/15/2021

 

                    Overweight          Dennis Ballard MD Onset: 06/15/2021

 

                    Electrocardiogram abnormal Dennis Ballard MD Onset: 06/15

/2021

 

                          Right bundle branch block AND left anterior fascicular

 block Dennis Ballard MD                                      Onset: 06/15/2021

 

                    Old myocardial infarction Dennis Ballard MD Onset: 06/15/

2021

 

                    Palpitations        Dennis Ballard MD Onset: 06/15/2021

 

                    Syncope and collapse Dennis Ballard MD Onset: 06/15/2021

 

                    Dizziness and giddiness Dennis Ballard MD Onset: 06/15/20

21







Social History





                Type            Date            Description     Comments

 

                Birth Sex                       Unknown          

 

                ETOH Use                        Rarely consumes alcohol  

 

                Tobacco Use     Start: Unknown End: Unknown Patient is a former 

smoker started at 

age 9, at most 1 ppd, quit in  

 

                Smoking Status  Reviewed: 07/15/21 Patient is a former smoker st

arted at age 9, 

at most 1 ppd, quit in  

 

                Exercise Type/Frequency                 Does not exercise curren

tly  

 

                Exercise Limitations                 Back Pain        

 

                Exercise Limitations                 Joint Pain      bilateral a

marjorie and legs 

 

                Exercise Limitations                 Claudication     







Allergies, Adverse Reactions, Alerts





             Active Allergies Criticality  Reaction | Severity Comments     Date

 

             Amoxicillin  Unable to assess criticality                          

 06/15/2021

 

             Atorvastatin Unable to assess criticality                          

 06/15/2021

 

             Tetracycline Unable to assess criticality                          

 06/15/2021

 

             Buspirone    Unable to assess criticality                          

 06/15/2021

 

             Adhesives    Unable to assess criticality                          

 06/15/2021

 

             Butalbital   Unable to assess criticality Hives                    

 2021

 

             Alinia       Unable to assess criticality                          

 06/15/2021

 

             Clindamycin/Lincomycin Unable to assess criticality Diarrhea       

           2021

 

             Carvedilol   Unable to assess criticality              Nausea      

 07/15/2021

 

             Nitazoxanide Unable to assess criticality Hives                    

 2021

 

             Repatha      Unable to assess criticality              Muscle Cramp

s 07/15/2021

 

             Statins      Unable to assess criticality                          

 2021

 

             Sulfamethoxazole / Trimethoprim Unable to assess criticality Rash |

 Mild               

2021







Medications





           Active Medications SIG        Qnty       Indications Ordering Provide

r Date

 

                          Ranolazine ER                     500mg Tablets ER 12H

R                   2 by 

mouth twice a day                                 Unknown         2021

 

                          Praluent                     150mg/ml Solution Auto-In

ject                   

inject 1 milliliters subcutaneous every 2 weeks 3ml             E78.2           

Dennis Ballard MD 

2021

 

                          Promethazine HCL                     25mg Tablets     

              1 by mouth 

every 6 hours as needed                                 Unknown         20

21

 

                          Zinc Chelated                     50mg Tablets        

           1 by mouth once

daily                                           Unknown         2021

 

                    Probiotic                      Capsules                   1 

by mouth every day  

                                        Unknown             2021

 

             Magnesium                     400mg Tablets                   165 m

g daily                           

Unknown                                 2021

 

                          Metoprolol Tartrate                     25mg Tablets  

                 Half 

tablet by mouth twice a day                 I10             Unknown         

 

                          Nitroglycerin                     0.6mg/HR Patches 24H

R                   1 

patch applied to skin every day (keep on 14 hours daily then remove) 30units    

               

I25.10                    Dennis Ballard MD      06/15/2021

 

                          Tramadol HCL                     50mg Tablets         

          2 by mouth every

8 hours as needed, as directed                                 Unknown         0

2021

 

                          Allopurinol                     300mg Tablets         

          1 by mouth every

day                                             Unknown         2021

 

                                        Vitamin D (Ergocalciferol)              

       1.25mg (98530 Ut) Capsules       

             1 by mouth every weekly                           Unknown      

 

                                        Vitamin Deficiency Injectable System-B12

                     1000mcg/ML Kit     

             inject 1 kit once monthly                           Unknown      

 

                          Meclizine HCL                     25mg Chewtabs       

            1 by mouth 

four times daily as needed                                 Unknown         

 

                          Pantoprazole Sodium                     40mg Tablets D

R                   2 by 

mouth every day                                 Unknown         2021

 

                          Tylenol Extra Strength                     500mg Table

ts                   2 by 

mouth twice daily as needed                                 Unknown         

 

                    Plavix                     75mg Tablets                   1 

by mouth every day  

                                        Unknown             2021

 

                          Aspirin                     325mg Tablets DR          

         1 by mouth every 

day                                             Unknown         2021

 

                          Nitroglycerin                     0.4mg Tablets Sub   

                1 tab sl 

every 5 min times 3 doses as needed chest discomfort                            

     Unknown         2021

 

                          Escitalopram Oxalate                     10mg Tablets 

                  1 by 

mouth every day                                 Unknown         2021

 

                          Valsartan                     80mg Tablets            

       2 by mouth daily at

bedtime                                         Unknown         2021

 

                                        Albuterol Sulfate HFA                   

  108(90Base) mcg/Act Aerosol           

             inhale two puffs as needed every 4 hours                           

Unknown      2021

 

                                        History Medications

 

                          Amlodipine Besylate                     2.5mg Tablets 

                  1 by 

mouth twice a day (hold dose for systolic BP <135) 180tabs                      

           Dennis Ballard MD                                      07/15/2021 - 2021

 

                                        Repatha Pushtronex System               

      420mg/3.5ML Solution Cartridge    

              420 mg (3.5 ml) sq qmo 10.5ml       E78.2        Dennis Ballard MD 

06/15/2021 - 2021

 

                          Carvedilol                     3.125mg Tablets        

           1 by mouth 

twice a day     60tabs          I10             Dennis Ballard MD 06/15/2021 

- 2021

 

                                        I25.2

 

                                        R07.9

 

                          Amlodipine Besylate                     2.5mg Tablets 

                  1 by 

mouth every day as needed for elevated BP                                 Unknow

n         2021 - 07/15/2021

 

                          Febuxostat                     40mg Tablets           

        1 by mouth every 

daily                                           Unknown         2021 - 

 

                          Metoclopramide HCL                     10mg Tablets   

                1 by mouth

four times daily                                 Unknown         2021 - 

 

                          Metoprolol Tartrate                     25mg Tablets  

                 1 by 

mouth twice a day                 I10             Unknown         2021 - 0

6/15/2021

 

                                        I25.2

 

                                        R07.9

 

                          Ranexa                     500mg Tablets ER 12HR      

             2 by mouth 

twice a day                                     Unknown         2021 - 2021

 

                          Covid-19 vaccine, Unspecified                      Inj

ection                    

                                                Unknown         







Immunizations





                                        Description

 

                                        No Information Available







Vital Signs





                Date            Vital           Result          Comment

 

                07/15/2021 10:44am Weight          155.00 lb        

 

                    Home Weight         153lb               home weight

 

                    Height              61 inches           5'1"

 

                    BMI (Body Mass Index) 29.3 kg/m2           

 

                    Heart Rate          55 /min              

 

                    BP Systolic Sitting 148 mmHg            CBP, adult cuff/Ra

 

                    BP Diastolic Sitting 63 mmHg             CBP, adult cuff/Ra

 

                06/15/2021 12:15pm Weight          157.00 lb        

 

                    Home Weight         157lb               home weight

 

                    Height              61 inches           5'1"

 

                    BMI (Body Mass Index) 29.7 kg/m2           

 

                    Heart Rate          56 /min              

 

                    BP Systolic Sitting 152 mmHg            CBP, adult cuff/Ra

 

                    BP Diastolic Sitting 63 mmHg             CBP, adult cuff/Ra







Results





        Test    Acquired Date Facility Test    Result  H/L     Range   Note

 

                    Lipid Panel         2021          [facility]

nter

           [phone] Triglycerides Level 316 mg/dL  High       <150        

 

             Cholesterol Level 191 mg/dL    Normal       <200          

 

             HDL Cholesterol 43 mg/dL     Normal       >40           

 

             LDL Cholesterol 85 mg/dL     Normal       <100          

 

             Non-HDL-C    148 mg/dL    Normal                     

 

             Cholesterol Risk Ratio 4.441        Normal       <5            

 

                    Laboratory test finding 2021          [facility]

           [phone] Vitamin B12 Level 689 pg/mL  Normal     247-911    1







                          1                         VITAMIN B12 NORMAL RANGE



NORMAL                     247 - 911 PG/ML

INDETERMINATE              211 - 246 PG/ML

DEFICIENT              LESS THAN 211 PG/ML









Procedures





                Date            Code            Description     Status

 

                09/10/2021      45595           External ECG Rec>48HR<7D Review 

& Interpretation Completed

 

                09/10/2021      25456           External ECG Rec>48HR<7D Recordi

ng Completed

 

                2021      80085           Echocardiogram 2-D Doppler Color

 Completed

 

                2021      66415           Complex Chronic Care MGMT Servic

e Ea Addl 30 Min Completed

 

                2021      07332           Complex Chronic Care Management 

SVC 1St 60 Min Completed

 

                08/10/2021      57051           TM Interpretation & Report Only 

Completed

 

                08/10/2021      61967           Myocardial Imaging (PET) Multipl

e Studies Completed

 

                07/15/2021      17001           Office/Outpatient Established Lo

w MDM 20-29 Min Completed

 

                    07/15/2021          87820               Arterial Pressure Wa

veform Analysis For Assessment Of Central 

Art                                     Completed

 

                06/15/2021      60161           Office/Outpatient New Moderate M

DM 45-59 Minutes Completed

 

                    06/15/2021          20891               Arterial Pressure Wa

veform Analysis For Assessment Of Central 

Art                                     Completed

 

                06/15/2021      86587           ECG 12-Lead     Completed







Medical Devices





                                        Description

 

                                        No Information Available







Encounters





           Type       Date       Location   Provider   Dx         Diagnosis

 

           Office Visit 2021  8:27a Main Office Dennis Ballard MD I10  

      Essential 

(primary) hypertension

 

                          I25.10                    Athscl heart disease of noemi

ve coronary artery w/o ang pctrs

 

                          E78.2                     Mixed hyperlipidemia

 

           Office Visit 07/15/2021 11:00a Main Office Dennis Ballard MD I25.1

0     Athscl 

heart disease of native coronary artery w/o ang pctrs

 

                          I10                       Essential (primary) hyperten

ayse

 

           Office Visit 06/15/2021 12:00p Main Office Dennis Ballard MD I25.1

0     Athscl 

heart disease of native coronary artery w/o ang pctrs

 

                          I25.2                     Old myocardial infarction

 

                          Z95.1                     Presence of aortocoronary by

pass graft

 

                          Z95.5                     Presence of coronary angiopl

asty implant and graft

 

                          R07.9                     Chest pain, unspecified

 

                          I10                       Essential (primary) hyperten

ayse

 

                          I35.0                     Nonrheumatic aortic (valve) 

stenosis

 

                          R94.31                    Abnormal electrocardiogram [

ECG] [EKG]

 

                          R55                       Syncope and collapse

 

                          I45.2                     Bifascicular block

 

                          R00.2                     Palpitations

 

                          R42                       Dizziness and giddiness

 

                          E78.2                     Mixed hyperlipidemia

 

                          E66.3                     Overweight

 

                          Z71.3                     Dietary counseling and surve

illance







Assessments





                Date            Code            Description     Provider

 

                09/10/2021      R42             Dizziness and giddiness Holter/E

vent/Telemetry

 

                09/10/2021      R00.2           Palpitations    Holter/Event/Tel

emetry

 

                2021      I35.0           Nonrheumatic aortic (valve) sten

osis ECHO

 

                2021      I10             Essential (primary) hypertension

 Dennis Ballrad MD

 

                    2021          I25.10              Atherosclerotic hear

t disease of native coronary artery 

without angina pectoris                 Dennis Ballard MD

 

                2021      E78.2           Mixed hyperlipidemia Dennis soria MD

 

                    08/10/2021          I25.10              Atherosclerotic hear

t disease of native coronary artery 

without angina pectoris                 Cardiac PET

 

                    07/15/2021          I25.10              Atherosclerotic hear

t disease of native coronary artery 

without angina pectoris                 Dennis Ballard MD

 

                07/15/2021      I10             Essential (primary) hypertension

 Dennis Ballard MD

 

                    06/15/2021          I25.10              Atherosclerotic hear

t disease of native coronary artery 

without angina pectoris                 Dennis Ballard MD

 

                06/15/2021      I25.2           Old myocardial infarction Dennis Ballard MD

 

                06/15/2021      Z95.1           Presence of aortocoronary bypass

 graft Dennis Ballard MD

 

                06/15/2021      Z95.5           Presence of coronary angioplasty

 implant and graft Dennis Ballard MD

 

                06/15/2021      R07.9           Chest pain, unspecified Dennis Ballard MD

 

                06/15/2021      I10             Essential (primary) hypertension

 Dennis Ballard MD

 

                06/15/2021      I35.0           Nonrheumatic aortic (valve) sten

osis Dennis Ballard MD

 

                06/15/2021      R94.31          Abnormal electrocardiogram [ECG]

 [EKG] Dennis Ballard MD

 

                06/15/2021      R55             Syncope and collapse Dennis soria MD

 

                06/15/2021      I45.2           Bifascicular block Dennis henderson MD

 

                06/15/2021      R00.2           Palpitations    Dennis Ballard MD

 

                06/15/2021      R42             Dizziness and giddiness Dennis Ballard MD

 

                06/15/2021      E78.2           Mixed hyperlipidemia Dennis soria MD

 

                06/15/2021      E66.3           Overweight      Dennis Ballard MD

 

                06/15/2021      Z71.3           Dietary counseling and surveilla

nce Dennis Ballard MD







Plan of Treatment

07/15/2021 - Dennis Ballard MD* I25.10 Atherosclerotic heart disease of 
  native coronary artery without angina pectoris* Recommendations:* Now that my 
  office has access to cardiac PET myocardial perfusion imaging which is a 
  superior diagnostic modality in comparison to stress SPECT myocardial perfu
  ayse imaging, I have canceled the stress SPECT myocardial perfusion imaging 
  study that I ordered previously and ordered cardiac PET myocardial perfusion 
  imaging instead (to define her coronary prognosis). Metoprolol tartrate was 
  decreased from 25 mg twice a day down to 12.5 mg twice a day with the hope of 
  providing a faster heart rate which in turn should improve the supply/demand 
  ratio as evidenced in the observed central BP waveform analysis today. 
  Continue nitroglycerin patch, valsartan, amlodipine, nitroglycerin sublingual,
   aspirin, clopidogrel, Ranexa.





* I10 Essential (primary) hypertension* Recommendations:* Metoprolol tartrate 
  was decreased to 12.5 mg twice a day. Continue valsartan and nitroglycerin 
  patch at the current dosages. Amlodipine 2.5 mg twice a day when necessary for
   systolic BP >160 as the patient is currently taking it was changed to 
  amlodipine 2.5 mg twice a day with instruction to hold dose for systolic BP <
  135 mmHg.





* All * New Medication:* Amlodipine Besylate 2.5 mg - 1 by mouth twice a day 
  (hold dose for systolic BP <135)



* Follow up:* Clinic visit in 8 weeks with Dr. Ballard.









Functional Status





                Functional Condition Comment         Date            Status

 

                Independent with all ADL's                                 Activ

e







Mental Status





                                        Description

 

                                        No Information Available







Referrals





                                        Description

 

                                        No Information Available

## 2021-10-26 NOTE — CCD
Continuity of Care Document (CCD)

                             Created on: 2021



Eryn Jacobo

External Reference #: [id number]

: 1945

Sex: Female



Demographics





                          Address                   [address]

[place] [id number]

 

                          Home Phone                [phone]

 

                          Preferred Language        Unknown

 

                          Marital Status            Unknown

 

                          Catholic Affiliation     Unknown

 

                          Race                      White

 

                          Ethnic Group              Not  or 





Author





                          Author                    Holter/Event/Telemetry, Ismael DUMONT

 

                          Organization              Unknown

 

                          Address                   [address], [address]

[place]  [address]



 

                          Phone                     [phone]







Care Team Providers





                    Care Team Member Name Role                Phone

 

                    Sumaya Martinez PA-C [facility]                [phone]

 

                    José Luis Short MD  AUTM                [phone]

 

                    Tera Morgan MD [facility]                [phone]

 

                    Halle Fallon RN ANP  [facility]                [phone]

 

                    Abhay Mario MD AUTM                [phone]







Problems





                    Active Problems     Provider            Date

 

                    History of coronary artery bypass grafting Dennis Ballard MD Onset: 

06/15/2021

 

                          Patient post percutaneous transluminal coronary angiop

lasty Dennis Ballard MD

                                        Onset: 06/15/2021

 

                    Essential hypertension Dennis Ballard MD Onset: 06/15/202

1

 

                    Aortic valve disorder Dennis Ballard MD Onset: 06/15/2021

 

                    Chest pain          Dennis Ballard MD Onset: 06/15/2021

 

                    Mixed hyperlipidemia Dennis Ballard MD Onset: 06/15/2021

 

                    Dietary management surveillance Dennis Ballard MD Onset: 

06/15/2021

 

                          Coronary arteriosclerosis after percutaneous coronary 

angioplasty Dennis Ballard MD                             Onset: 06/15/2021

 

                    Overweight          Dennis Ballard MD Onset: 06/15/2021

 

                    Electrocardiogram abnormal Dennis Ballard MD Onset: 06/15

/2021

 

                          Right bundle branch block AND left anterior fascicular

 block Dennis Ballard MD                                      Onset: 06/15/2021

 

                    Old myocardial infarction Dennis Ballard MD Onset: 06/15/

2021

 

                    Palpitations        Dennis Ballard MD Onset: 06/15/2021

 

                    Syncope and collapse Dennis Ballard MD Onset: 06/15/2021

 

                    Dizziness and giddiness Dennis Ballard MD Onset: 06/15/20

21







Social History





                Type            Date            Description     Comments

 

                Birth Sex                       Unknown          

 

                ETOH Use                        Rarely consumes alcohol  

 

                Tobacco Use     Start: Unknown End: Unknown Patient is a former 

smoker started at 

age 9, at most 1 ppd, quit in  

 

                Smoking Status  Reviewed: 07/15/21 Patient is a former smoker st

arted at age 9, 

at most 1 ppd, quit in  

 

                Exercise Type/Frequency                 Does not exercise curren

tly  

 

                Exercise Limitations                 Back Pain        

 

                Exercise Limitations                 Joint Pain      bilateral a

marjorie and legs 

 

                Exercise Limitations                 Claudication     







Allergies, Adverse Reactions, Alerts





             Active Allergies Criticality  Reaction | Severity Comments     Date

 

             Amoxicillin  Unable to assess criticality                          

 06/15/2021

 

             Atorvastatin Unable to assess criticality                          

 06/15/2021

 

             Tetracycline Unable to assess criticality                          

 06/15/2021

 

             Buspirone    Unable to assess criticality                          

 06/15/2021

 

             Adhesives    Unable to assess criticality                          

 06/15/2021

 

             Butalbital   Unable to assess criticality Hives                    

 2021

 

             Alinia       Unable to assess criticality                          

 06/15/2021

 

             Clindamycin/Lincomycin Unable to assess criticality Diarrhea       

           2021

 

             Carvedilol   Unable to assess criticality              Nausea      

 07/15/2021

 

             Nitazoxanide Unable to assess criticality Hives                    

 2021

 

             Repatha      Unable to assess criticality              Muscle Cramp

s 07/15/2021

 

             Statins      Unable to assess criticality                          

 2021

 

             Sulfamethoxazole / Trimethoprim Unable to assess criticality Rash |

 Mild               

2021







Medications





           Active Medications SIG        Qnty       Indications Ordering Provide

r Date

 

                          Praluent                     150mg/ml Solution Auto-In

ject                   

inject 1 milliliters subcutaneous every 2 weeks 3ml             E78.2           

Dennis Ballard MD 

2021

 

                          Promethazine HCL                     25mg Tablets     

              1 by mouth 

every 6 hours as needed                                 Unknown         20

 

                          Zinc Chelated                     50mg Tablets        

           1 by mouth once

daily                                           Unknown         2021

 

                    Probiotic                      Capsules                   1 

by mouth every day  

                                        Unknown             2021

 

             Magnesium                     400mg Tablets                   165 m

g daily                           

Unknown                                 2021

 

                          Metoprolol Tartrate                     25mg Tablets  

                 Half 

tablet by mouth twice a day                 I10             Unknown         

 

                          Nitroglycerin                     0.6mg/HR Patches 24H

R                   1 

patch applied to skin every day (keep on 14 hours daily then remove) 30units    

               

I25.10                    Dennis Ballard MD      06/15/2021

 

                          Tylenol Extra Strength                     500mg Table

ts                   2 by 

mouth twice daily as needed                                 Unknown         

 

                          Allopurinol                     300mg Tablets         

          1 by mouth every

day                                             Unknown         2021

 

                                        Vitamin D (Ergocalciferol)              

       1.25mg (69894 Ut) Capsules       

             1 by mouth every weekly                           Unknown      

 

                                        Vitamin Deficiency Injectable System-B12

                     1000mcg/ML Kit     

             inject 1 kit once monthly                           Unknown      

 

                          Ranexa                     500mg Tablets ER 12HR      

             2 by mouth 

twice a day                                     Unknown         2021

 

                          Meclizine HCL                     25mg Chewtabs       

            1 by mouth 

four times daily as needed                                 Unknown         

 

                          Pantoprazole Sodium                     40mg Tablets D

R                   2 by 

mouth every day                                 Unknown         2021

 

                          Tramadol HCL                     50mg Tablets         

          2 by mouth every

8 hours as needed, as directed                                 Unknown         0

2021

 

                    Plavix                     75mg Tablets                   1 

by mouth every day  

                                        Unknown             2021

 

                          Aspirin                     325mg Tablets DR          

         1 by mouth every 

day                                             Unknown         2021

 

                          Nitroglycerin                     0.4mg Tablets Sub   

                1 tab sl 

every 5 min times 3 doses as needed chest discomfort                            

     Unknown         2021

 

                          Escitalopram Oxalate                     10mg Tablets 

                  1 by 

mouth every day                                 Unknown         2021

 

                          Valsartan                     80mg Tablets            

       2 by mouth daily at

bedtime                                         Unknown         2021

 

                                        Albuterol Sulfate HFA                   

  108(90Base) mcg/Act Aerosol           

             inhale two puffs as needed every 4 hours                           

Unknown      2021

 

                                        History Medications

 

                          Amlodipine Besylate                     2.5mg Tablets 

                  1 by 

mouth twice a day (hold dose for systolic BP <135) 180tabs                      

           Dennis Ballard MD                                      07/15/2021 - 2021

 

                                        Repatha Pushtronex System               

      420mg/3.5ML Solution Cartridge    

              420 mg (3.5 ml) sq qmo 10.5ml       E78.2        Dennis Ballard MD 

06/15/2021 - 2021

 

                          Carvedilol                     3.125mg Tablets        

           1 by mouth 

twice a day     60tabs          I10             Dennis Ballard MD 06/15/2021 

- 2021

 

                                        I25.2

 

                                        R07.9

 

                          Amlodipine Besylate                     2.5mg Tablets 

                  1 by 

mouth every day as needed for elevated BP                                 Unknow

n         2021 - 07/15/2021

 

                          Febuxostat                     40mg Tablets           

        1 by mouth every 

daily                                           Unknown         2021 - 

 

                          Metoclopramide HCL                     10mg Tablets   

                1 by mouth

four times daily                                 Unknown         2021 - 

 

                          Metoprolol Tartrate                     25mg Tablets  

                 1 by 

mouth twice a day                 I10             Unknown         2021 - 0

6/15/2021

 

                                        I25.2

 

                                        R07.9

 

                          Covid-19 vaccine, Unspecified                      Inj

ection                    

                                                Unknown         







Immunizations





                                        Description

 

                                        No Information Available







Vital Signs





                Date            Vital           Result          Comment

 

                07/15/2021 10:44am Weight          155.00 lb        

 

                    Home Weight         153lb               home weight

 

                    Height              61 inches           5'1"

 

                    BMI (Body Mass Index) 29.3 kg/m2           

 

                    Heart Rate          55 /min              

 

                    BP Systolic Sitting 148 mmHg            CBP, adult cuff/Ra

 

                    BP Diastolic Sitting 63 mmHg             CBP, adult cuff/Ra

 

                06/15/2021 12:15pm Weight          157.00 lb        

 

                    Home Weight         157lb               home weight

 

                    Height              61 inches           5'1"

 

                    BMI (Body Mass Index) 29.7 kg/m2           

 

                    Heart Rate          56 /min              

 

                    BP Systolic Sitting 152 mmHg            CBP, adult cuff/Ra

 

                    BP Diastolic Sitting 63 mmHg             CBP, adult cuff/Ra







Results





        Test    Acquired Date Facility Test    Result  H/L     Range   Note

 

                    Lipid Panel         2021          [facility]

nter

           [phone] Triglycerides Level 316 mg/dL  High       <150        

 

             Cholesterol Level 191 mg/dL    Normal       <200          

 

             HDL Cholesterol 43 mg/dL     Normal       >40           

 

             LDL Cholesterol 85 mg/dL     Normal       <100          

 

             Non-HDL-C    148 mg/dL    Normal                     

 

             Cholesterol Risk Ratio 4.441        Normal       <5            

 

                    Laboratory test finding 2021          [facility]

           [phone] Vitamin B12 Level 689 pg/mL  Normal     247-911    1

 

                    CMP                 2021          [facility]

             Albumin Serum/Plasma 4.1                                     

 

             Alt - SGPT   31                                      

 

             Calcium Ser/Plasma Mass/Vol 9.5                                    

 

 

             Carbon Dioxide Ser/Plasm 24                                      

 

             Chloride Serum/Plasma 105                                     

 

             Alkaline Phosphatase 52                                      

 

             Potassium    4.4                                     

 

             Protein Total 7.8                                     

 

             Sodium       140                                     

 

             Ast - Sgot   18                                      

 

             BUN - Urea Nitrogen 30                                      

 

             Glucose      96                                      

 

             Creatinine For GFR 1.69                                    

 

                    CBC without Differential 2021          [facility]

e

             White Blood Count 8.1                                     

 

             Red Blood Count 3.05                                    

 

             Platelets    173                                     

 

             Hemoglobin   11.0                                    

 

             Hematocrit   32.4                                    

 

                    Laboratory test finding 2021          [facility]

             Thyroid Stimulating Hormone 2.105                                  

 







                          1                         VITAMIN B12 NORMAL RANGE



NORMAL                     247 - 911 PG/ML

INDETERMINATE              211 - 246 PG/ML

DEFICIENT              LESS THAN 211 PG/ML









Procedures





                Date            Code            Description     Status

 

                09/10/2021      35754           External ECG Rec>48HR<7D Review 

& Interpretation Completed

 

                09/10/2021      42649           External ECG Rec>48HR<7D Recordi

ng Completed

 

                2021      41963           Echocardiogram 2-D Doppler Color

 Completed

 

                08/10/2021      84449           TM Interpretation & Report Only 

Completed

 

                08/10/2021      58937           Myocardial Imaging (PET) Multipl

e Studies Completed

 

                07/15/2021      23578           Office/Outpatient Established Lo

w MDM 20-29 Min Completed

 

                    07/15/2021          66011               Arterial Pressure Wa

veform Analysis For Assessment Of Central 

Art                                     Completed

 

                06/15/2021      14699           Office/Outpatient New Moderate M

DM 45-59 Minutes Completed

 

                    06/15/2021          56049               Arterial Pressure Wa

veform Analysis For Assessment Of Central 

Art                                     Completed

 

                06/15/2021      91454           ECG 12-Lead     Completed







Medical Devices





                                        Description

 

                                        No Information Available







Encounters





           Type       Date       Location   Provider   Dx         Diagnosis

 

           Office Visit 07/15/2021 11:00a Main Office Dennis Ballard MD I25.1

0     [facility] 

heart disease of native coronary artery w/o ang pctrs

 

                          I10                       Essential (primary) hyperten

ayse

 

           Office Visit 06/15/2021 12:00p Main Office Dennis Ballard MD I25.1

0     [facility] 

heart disease of native coronary artery w/o ang pctrs

 

                          I25.2                     Old myocardial infarction

 

                          Z95.1                     Presence of aortocoronary by

pass graft

 

                          Z95.5                     Presence of coronary angiopl

asty implant and graft

 

                          R07.9                     Chest pain, unspecified

 

                          I10                       Essential (primary) hyperten

ayse

 

                          I35.0                     Nonrheumatic aortic (valve) 

stenosis

 

                          R94.31                    Abnormal electrocardiogram [

ECG] [EKG]

 

                          R55                       Syncope and collapse

 

                          I45.2                     Bifascicular block

 

                          R00.2                     Palpitations

 

                          R42                       Dizziness and giddiness

 

                          E78.2                     Mixed hyperlipidemia

 

                          E66.3                     Overweight

 

                          Z71.3                     Dietary counseling and surve

illance







Assessments





                Date            Code            Description     Provider

 

                09/10/2021      R42             Dizziness and giddiness Holter/E

vent/Telemetry

 

                09/10/2021      R00.2           Palpitations    Holter/Event/Tel

emetry

 

                2021      I35.0           Nonrheumatic aortic (valve) sten

osis ECHO

 

                    08/10/2021          I25.10              Atherosclerotic hear

t disease of native coronary artery 

without angina pectoris                 Cardiac PET

 

                    07/15/2021          I25.10              Atherosclerotic hear

t disease of native coronary artery 

without angina pectoris                 Dennis Ballard MD

 

                07/15/2021      I10             Essential (primary) hypertension

 Dennis Ballard MD

 

                    06/15/2021          I25.10              Atherosclerotic hear

t disease of native coronary artery 

without angina pectoris                 Dennis Ballard MD

 

                06/15/2021      I25.2           Old myocardial infarction Dennis Ballard MD

 

                06/15/2021      Z95.1           Presence of aortocoronary bypass

 graft Dennis Ballard MD

 

                06/15/2021      Z95.5           Presence of coronary angioplasty

 implant and graft Dennis Ballard MD

 

                06/15/2021      R07.9           Chest pain, unspecified Dennis Ballard MD

 

                06/15/2021      I10             Essential (primary) hypertension

 Dennis Ballard MD

 

                06/15/2021      I35.0           Nonrheumatic aortic (valve) sten

osis Dennis Ballard MD

 

                06/15/2021      R94.31          Abnormal electrocardiogram [ECG]

 [EKG] Dennis Ballard MD

 

                06/15/2021      R55             Syncope and collapse Dennis soria MD

 

                06/15/2021      I45.2           Bifascicular block Dennis henderson MD

 

                06/15/2021      R00.2           Palpitations    Dennis Ballard MD

 

                06/15/2021      R42             Dizziness and giddiness Dennis Ballard MD

 

                06/15/2021      E78.2           Mixed hyperlipidemia Dennis soria MD

 

                06/15/2021      E66.3           Overweight      Dennis Ballard MD

 

                06/15/2021      Z71.3           Dietary counseling and surveilla

nce Dennis Ballard MD







Plan of Treatment

Future Appointment(s):* 2021 12:15 pm - Dennis Ballard MD at Main 
  Office

07/15/2021 - Dennis Ballard MD* I25.10 Atherosclerotic heart disease of 
  native coronary artery without angina pectoris* Recommendations:* Now that my 
  office has access to cardiac PET myocardial perfusion imaging which is a 
  superior diagnostic modality in comparison to stress SPECT myocardial perfu
  ayse imaging, I have canceled the stress SPECT myocardial perfusion imaging 
  study that I ordered previously and ordered cardiac PET myocardial perfusion 
  imaging instead (to define her coronary prognosis). Metoprolol tartrate was 
  decreased from 25 mg twice a day down to 12.5 mg twice a day with the hope of 
  providing a faster heart rate which in turn should improve the supply/demand 
  ratio as evidenced in the observed central BP waveform analysis today. 
  Continue nitroglycerin patch, valsartan, amlodipine, nitroglycerin sublingual,
   aspirin, clopidogrel, Ranexa.





* I10 Essential (primary) hypertension* Recommendations:* Metoprolol tartrate 
  was decreased to 12.5 mg twice a day. Continue valsartan and nitroglycerin 
  patch at the current dosages. Amlodipine 2.5 mg twice a day when necessary for
   systolic BP >160 as the patient is currently taking it was changed to 
  amlodipine 2.5 mg twice a day with instruction to hold dose for systolic BP <
  135 mmHg.





* All * New Medication:* Amlodipine Besylate 2.5 mg - 1 by mouth twice a day 
  (hold dose for systolic BP <135)



* Follow up:* Clinic visit in 8 weeks with Dr. Ballard.









Functional Status





                Functional Condition Comment         Date            Status

 

                Independent with all ADL's                                 Activ

e







Mental Status





                                        Description

 

                                        No Information Available







Referrals





                                        Description

 

                                        No Information Available

## 2021-10-26 NOTE — REP
INDICATION:

Coronavirus workup.



COMPARISON:

08/29/2018.



TECHNIQUE:

Single portable AP view of the chest was performed.



FINDINGS:

Mild scattered interstitial fibrotic changes noted.  There is no definite superimposed

acute infiltrate.  The heart is not significantly enlarged.  The mediastinal

silhouette is unchanged with some calcific plaque in the thoracic aorta.  Multiple

sternal wires and mediastinal clips are present.



IMPRESSION:

Stable chronic findings.  No evidence of acute infiltrate.





<Electronically signed by Royce Calderón > 10/26/21 0850

## 2021-10-26 NOTE — CCD
Continuity of Care Document (CCD)

                             Created on: 2021



Eryn Jacobo

External Reference #: [id number]

: 1945

Sex: Female



Demographics





                          Address                   [address]

[place] [id number]

 

                          Home Phone                [phone]

 

                          Preferred Language        Unknown

 

                          Marital Status            Unknown

 

                          Catholic Affiliation     Unknown

 

                          Race                      White

 

                          Ethnic Group              Not  or 





Author





                          Author                    Chanel MEDINA Eryn M

 

                          Organization              Unknown

 

                          Address                   [address] [place] [address], [address]

[place]  [address]



 

                          Phone                     [phone]







Care Team Providers





                    Care Team Member Name Role                Phone

 

                    Sumaya Martinez PA-C [facility]                [phone]

 

                    José Luis Short MD  [facility]                [phone]

 

                    Tera Morgan MD [facility]                [phone]

 

                    Halle Fallon RN ANP  [facility]                [phone]







Problems





                    Active Problems     Provider            Date

 

                    History of coronary artery bypass grafting Dennis Ballard MD Onset: 

06/15/2021

 

                          Patient post percutaneous transluminal coronary angiop

lasty Dennis Ballard MD

                                        Onset: 06/15/2021

 

                    Essential hypertension Dennis Ballard MD Onset: 06/15/202

1

 

                    Aortic valve disorder Dennis Ballard MD Onset: 06/15/2021

 

                    Chest pain          Dennis Ballard MD Onset: 06/15/2021

 

                    Mixed hyperlipidemia Dennis Ballard MD Onset: 06/15/2021

 

                    Dietary management surveillance Dennis Ballard MD Onset: 

06/15/2021

 

                          Coronary arteriosclerosis after percutaneous coronary 

angioplasty Dennis Ballard MD                             Onset: 06/15/2021

 

                    Overweight          Dennis Ballard MD Onset: 06/15/2021

 

                    Electrocardiogram abnormal Dennis Ballard MD Onset: 06/15

/2021

 

                          Right bundle branch block AND left anterior fascicular

 block Dennis Ballard MD                                      Onset: 06/15/2021

 

                    Old myocardial infarction Dennis Ballard MD Onset: 06/15/

2021

 

                    Palpitations        Dennis Ballard MD Onset: 06/15/2021

 

                    Syncope and collapse Dennis Ballard MD Onset: 06/15/2021

 

                    Dizziness and giddiness Dennis Ballard MD Onset: 06/15/20

21







Social History





                Type            Date            Description     Comments

 

                Birth Sex                       Unknown          

 

                ETOH Use                        Rarely consumes alcohol  

 

                Tobacco Use     Start: Unknown End: Unknown Patient is a former 

smoker started at 

age 9, at most 1 ppd, quit in  

 

                Smoking Status  Reviewed: 07/15/21 Patient is a former smoker st [facility] at age 9, 

at most 1 ppd, quit in  

 

                Exercise Type/Frequency                 Does not exercise curren

tly  

 

                Exercise Limitations                 Back Pain        

 

                Exercise Limitations                 Joint Pain      bilateral a

marjorie and legs 

 

                Exercise Limitations                 Claudication     







Allergies, Adverse Reactions, Alerts





             Active Allergies Reaction     Severity     Comments     Date

 

             Amoxicillin                                         06/15/2021

 

             Atorvastatin                                        06/15/2021

 

             Tetracycline                                        06/15/2021

 

             Buspirone                                           06/15/2021

 

             Adhesives                                           06/15/2021

 

             Alinia                                              06/15/2021

 

             Carvedilol                             Nausea       07/15/2021

 

             Repatha                                Muscle Cramps 07/15/2021







Medications





           Active Medications SIG        Qnty       Indications Ordering Provide

r Date

 

                          Amlodipine Besylate                     2.5mg Tablets 

                  1 by 

mouth twice a day (hold dose for systolic BP <135) 180tabs                      

           Dennis Ballard MD                                      07/15/2021

 

                          Metoprolol Tartrate                     25mg Tablets  

                 Half 

tablet by mouth twice a day                 I10             Unknown         

 

                          Nitroglycerin                     0.6mg/HR Patches 24H

R                   1 

patch applied to skin every day (keep on 14 hours daily then remove) 30units    

               

I25.10                    Dennis Ballard MD      06/15/2021

 

                          Tylenol Extra Strength                     500mg Table

ts                   2 by 

mouth twice daily as needed                                 Unknown         

 

                          Allopurinol                     300mg Tablets         

          1 by mouth every

day                                             Unknown         2021

 

                                        Vitamin D (Ergocalciferol)              

       1.25mg (22417 Ut) Capsules       

             1 by mouth every weekly                           Unknown      

 

                                        Vitamin Deficiency Injectable System-B12

                     1000mcg/ML Kit     

             inject 1 kit once monthly                           Unknown      

 

                          Ranexa                     500mg Tablets ER 12HR      

             2 by mouth 

twice a day                                     Unknown         2021

 

                          Meclizine HCL                     25mg Chewtabs       

            1 by mouth 

four times daily as needed                                 Unknown         

 

                          Metoclopramide HCL                     10mg Tablets   

                1 by mouth

four times daily                                 Unknown         2021

 

                          Pantoprazole Sodium                     40mg Tablets D

R                   2 by 

mouth every day                                 Unknown         2021

 

                          Tramadol HCL                     50mg Tablets         

          2 by mouth every

8 hours as needed, as directed                                 Unknown         0

2021

 

                    Plavix                     75mg Tablets                   1 

by mouth every day  

                                        Unknown             2021

 

                          Aspirin                     325mg Tablets DR          

         1 by mouth every 

day                                             Unknown         2021

 

                          Nitroglycerin                     0.4mg Tablets Sub   

                1 tab sl 

every 5 min times 3 doses as needed chest discomfort                            

     Unknown         2021

 

                          Febuxostat                     40mg Tablets           

        1 by mouth every 

daily                                           Unknown         2021

 

                          Escitalopram Oxalate                     10mg Tablets 

                  1 by 

mouth every day                                 Unknown         2021

 

                          Valsartan                     80mg Tablets            

       2 by mouth daily at

bedtime                                         Unknown         2021

 

                                        Albuterol Sulfate HFA                   

  108(90Base) mcg/Act Aerosol           

             inhale two puffs as needed every 4 hours                           

Unknown      2021

 

                                        History Medications

 

                                        Repatha Pushtronex System               

      420mg/3.5ML Solution Cartridge    

              420 mg (3.5 ml) sq qmo 10.5ml       E78.2        Dennis Ballard MD 

06/15/2021 - 2021

 

                          Carvedilol                     3.125mg Tablets        

           1 by mouth 

twice a day     60tabs          I10             Dennis Ballard MD 06/15/2021 

- 2021

 

                                        I25.2

 

                                        R07.9

 

                          Amlodipine Besylate                     2.5mg Tablets 

                  1 by 

mouth every day as needed for elevated BP                                 Unknow

n         2021 - 07/15/2021

 

                          Metoprolol Tartrate                     25mg Tablets  

                 1 by 

mouth twice a day                 I10             Unknown         2021 - 0

6/15/2021

 

                                        I25.2

 

                                        R07.9

 

                          Covid-19 vaccine, Unspecified                      Inj

ection                    

                                                Unknown         







Immunizations





                                        Description

 

                                        No Information Available







Vital Signs





                Date            Vital           Result          Comment

 

                07/15/2021 10:44am Weight          155.00 lb        

 

                    Home Weight         153lb               home weight

 

                    Height              61 inches           5'1"

 

                    BMI (Body Mass Index) 29.3 kg/m2           

 

                    Heart Rate          55 /min              

 

                    BP Systolic Sitting 148 mmHg            CBP, adult cuff/Ra

 

                    BP Diastolic Sitting 63 mmHg             CBP, adult cuff/Ra

 

                06/15/2021 12:15pm Weight          157.00 lb        

 

                    Home Weight         157lb               home weight

 

                    Height              61 inches           5'1"

 

                    BMI (Body Mass Index) 29.7 kg/m2           

 

                    Heart Rate          56 /min              

 

                    BP Systolic Sitting 152 mmHg            CBP, adult cuff/Ra

 

                    BP Diastolic Sitting 63 mmHg             CBP, adult cuff/Ra







Results





        Test    Acquired Date Facility Test    Result  H/L     Range   Note

 

                    Lipid Panel         2021          [facility]

nter

           [phone] Triglycerides Level 316 mg/dL  High       <150        

 

             Cholesterol Level 191 mg/dL    Normal       <200          

 

             HDL Cholesterol 43 mg/dL     Normal       >40           

 

             LDL Cholesterol 85 mg/dL     Normal       <100          

 

             Non-HDL-C    148 mg/dL    Normal                     

 

             Cholesterol Risk Ratio 4.441        Normal       <5            

 

                    Laboratory test finding 2021          [facility]

           [phone] Vitamin B12 Level 689 pg/mL  Normal     247-911    1

 

                    CMP                 2021          [facility]

             Albumin Serum/Plasma 4.1                                     

 

             Alt - SGPT   31                                      

 

             Calcium Ser/Plasma Mass/Vol 9.5                                    

 

 

             Carbon Dioxide Ser/Plasm 24                                      

 

             Chloride Serum/Plasma 105                                     

 

             Alkaline Phosphatase 52                                      

 

             Potassium    4.4                                     

 

             Protein Total 7.8                                     

 

             Sodium       140                                     

 

             Ast - Sgot   18                                      

 

             BUN - Urea Nitrogen 30                                      

 

             Glucose      96                                      

 

             Creatinine For GFR 1.69                                    

 

                    CBC without Differential 2021          [facility]

e

             White Blood Count 8.1                                     

 

             Red Blood Count 3.05                                    

 

             Platelets    173                                     

 

             Hemoglobin   11.0                                    

 

             Hematocrit   32.4                                    

 

                    Laboratory test finding 2021          [facility]

             Thyroid Stimulating Hormone 2.105                                  

 







                          1                         VITAMIN B12 NORMAL RANGE



NORMAL                     247 - 911 PG/ML

INDETERMINATE              211 - 246 PG/ML

DEFICIENT              LESS THAN 211 PG/ML









Procedures





                Date            Code            Description     Status

 

                08/10/2021      62723           TM Interpretation & Report Only 

Completed

 

                08/10/2021      31030           Myocardial Imaging (PET) Multipl

e Studies Completed

 

                07/15/2021      25253           Office/Outpatient Established Lo

w MDM 20-29 Min Completed

 

                    07/15/2021          73248               Arterial Pressure Wa

veform Analysis For Assessment Of Central 

Art                                     Completed

 

                06/15/2021      28487           Office/Outpatient New Moderate M

DM 45-59 Minutes Completed

 

                    06/15/2021          57187               Arterial Pressure Wa

veform Analysis For Assessment Of Central 

Art                                     Completed

 

                06/15/2021      18880           ECG 12-Lead     Completed







Medical Devices





                                        Description

 

                                        No Information Available







Encounters





           Type       Date       Location   Provider   Dx         Diagnosis

 

           Office Visit 07/15/2021 11:00a Main Office Dennis Ballard MD I25.1

0     [facility] 

heart disease of native coronary artery w/o ang pctrs

 

                          I10                       Essential (primary) hyperten

ayse

 

           Office Visit 06/15/2021 12:00p Main Office Dennis Ballard MD I25.1

0     [facility] 

heart disease of native coronary artery w/o ang pctrs

 

                          I25.2                     Old myocardial infarction

 

                          Z95.1                     Presence of aortocoronary by

pass graft

 

                          Z95.5                     Presence of coronary angiopl

asty implant and graft

 

                          R07.9                     Chest pain, unspecified

 

                          I10                       Essential (primary) hyperten

ayse

 

                          I35.0                     Nonrheumatic aortic (valve) 

stenosis

 

                          R94.31                    Abnormal electrocardiogram [

ECG] [EKG]

 

                          R55                       Syncope and collapse

 

                          I45.2                     Bifascicular block

 

                          R00.2                     Palpitations

 

                          R42                       Dizziness and giddiness

 

                          E78.2                     Mixed hyperlipidemia

 

                          E66.3                     Overweight

 

                          Z71.3                     Dietary counseling and surve

illance







Assessments





                Date            Code            Description     Provider

 

                    08/10/2021          I25.10              Atherosclerotic hear

t disease of native coronary artery 

without angina pectoris                 Cardiac PET

 

                    07/15/2021          I25.10              Atherosclerotic hear

t disease of native coronary artery 

without angina pectoris                 Dennis Ballard MD

 

                07/15/2021      I10             Essential (primary) hypertension

 Dennis Ballard MD

 

                    06/15/2021          I25.10              Atherosclerotic hear

t disease of native coronary artery 

without angina pectoris                 Dennis Ballard MD

 

                06/15/2021      I25.2           Old myocardial infarction Dennis Ballard MD

 

                06/15/2021      Z95.1           Presence of aortocoronary bypass

 graft Dennis Ballard MD

 

                06/15/2021      Z95.5           Presence of coronary angioplasty

 implant and graft Dennis Ballard MD

 

                06/15/2021      R07.9           Chest pain, unspecified Dennis Ballard MD

 

                06/15/2021      I10             Essential (primary) hypertension

 Dennis Ballard MD

 

                06/15/2021      I35.0           Nonrheumatic aortic (valve) sten

osis Dennis Ballard MD

 

                06/15/2021      R94.31          Abnormal electrocardiogram [ECG]

 [EKG] Dennis Ballard MD

 

                06/15/2021      R55             Syncope and collapse Dennis soria MD

 

                06/15/2021      I45.2           Bifascicular block Dennis henderson MD

 

                06/15/2021      R00.2           Palpitations    Dennis Ballard MD

 

                06/15/2021      R42             Dizziness and giddiness Dennis Ballard MD

 

                06/15/2021      E78.2           Mixed hyperlipidemia Dennis soria MD

 

                06/15/2021      E66.3           Overweight      Dennis Ballard MD

 

                06/15/2021      Z71.3           Dietary counseling and surveilla

nce Dennis Ballard MD







Plan of Treatment

Future Appointment(s):* 2021 12:15 pm - Dennis Ballard MD at Main 
  Office

* 2021  1:00 pm - ECHO at Main Office

07/15/2021 - Dennis Ballard MD* I25.10 Atherosclerotic heart disease of 
  native coronary artery without angina pectoris* Recommendations:* Now that my 
  office has access to cardiac PET myocardial perfusion imaging which is a 
  superior diagnostic modality in comparison to stress SPECT myocardial perfu
  ayse imaging, I have canceled the stress SPECT myocardial perfusion imaging 
  study that I ordered previously and ordered cardiac PET myocardial perfusion 
  imaging instead (to define her coronary prognosis). Metoprolol tartrate was 
  decreased from 25 mg twice a day down to 12.5 mg twice a day with the hope of 
  providing a faster heart rate which in turn should improve the supply/demand 
  ratio as evidenced in the observed central BP waveform analysis today. 
  Continue nitroglycerin patch, valsartan, amlodipine, nitroglycerin sublingual,
   aspirin, clopidogrel, Ranexa.





* I10 Essential (primary) hypertension* Recommendations:* Metoprolol tartrate 
  was decreased to 12.5 mg twice a day. Continue valsartan and nitroglycerin 
  patch at the current dosages. Amlodipine 2.5 mg twice a day when necessary for
   systolic BP >160 as the patient is currently taking it was changed to 
  amlodipine 2.5 mg twice a day with instruction to hold dose for systolic BP <
  135 mmHg.





* All * New Medication:* Amlodipine Besylate 2.5 mg - 1 by mouth twice a day 
  (hold dose for systolic BP <135)



* Follow up:* Clinic visit in 8 weeks with Dr. Ballard.









Functional Status





                Functional Condition Comment         Date            Status

 

                Independent with all ADL's                                 Activ

e







Mental Status





                                        Description

 

                                        No Information Available







Referrals





                                        Description

 

                                        No Information Available

## 2021-10-26 NOTE — CCD
Summarization of Episode Note

                             Created on: 10/20/2021



Dr. LORI JACOBO

External Reference #: [id number]

: 1945

Sex: Female



Demographics





                          Address                   [place] 

[place]  [address]

 

                          Home Phone                [phone]

 

                          Preferred Language        Unknown

 

                          Marital Status            Unknown

 

                          Oriental orthodox Affiliation     Unknown

 

                          Race                      White

 

                          Ethnic Group              Not  or 





Author





                          Author                    [facility]

ems

 

                          Organization              [facility]

ems

 

                          Address                   Unknown

 

                          Phone                     Unavailable







Support





                Name            Relationship    Address         Phone

 

                    LORI JACOBO                [address] 

[place], [place]  [address] [phone]

 

                Trevin [place]  [address] [phone]







Care Team Providers





                    Care Team Member Name Role                Phone

 

                    Kailee West        Unavailable         [phone]







PROBLEMS





          Type      Condition ICD9-CM Code PUM77-RI Code Onset Dates Condition S

tatus W/U 

Status              Risk                SNOMED Code         Notes

 

       Problem Obstructive sleep apnea        G47.33        Active confirmed    

    [id number]  

 

       Problem Anxiety        F41.9         Active confirmed        [id number] She

 is on Lexapro therapy 

at 20 g daily but she feels that is too strong. She will cut back to 10 mg 
daily.

 

       Problem Essential hypertension        I10           Active confirmed     

   [id number]  

 

                          Problem                   Coronary artery disease of b

ypass graft of native heart with stable 

angina pectoris         I25.708         Active  confirmed         [id number] She 

has coronary artery

disease with a history of interventions. She is complaining of dyspnea on 
exertion. She does not desaturate with ambulation. She is on a beta blocker, 
ARB, Ranexa, aspirin and Plavix therapy. She indicates that she had a stress 
test within the last year or so. I've asked her cardiologist to consider 
reassessment of her coronary anatomy and pulmonary pressures in the near future.

 

       Problem Lumbosacral spondylolysis        M43.07        Active confirmed  

      [id number]  

 

       Problem Sleep apnea in adult        G47.30        Active confirmed       

 [id number]  

 

                          Problem                   Hypertensive heart and chron

ic kidney disease with heart failure and 

stage 1 through stage 4 chronic kidney disease, or unspecified chronic kidney 
disease         I13.0           Active  confirmed         [id number] She did not 

do well with a change

from her valsartan and hydrochlorothiazide to irbesartan therapy and is back on 
the former with reasonable blood pressure control. I think the 
hydrochlorothiazide was stopped because of her chronic renal disease/solitary 
kidney but I doubt that the low dose of the thiazide has substantially affected 
her kidney function. She will keep her nephrology follow-up.

 

          Problem   Gastro-esophageal reflux disease with esophagitis           

K21.0               Active    

confirmed                               [id number]            

 

          Problem   Other osteoarthritis of spine, lumbar region           M47.8

96             Active    

confirmed                               [id number]            

 

        Problem Stenosis of left carotid artery         I65.22          Active  

confirmed         

[id number]                          

 

       Problem Coronary arteriosclerosis        I25.10        Active confirmed  

      54914252  

 

          Problem   DDD (degenerative disc disease), lumbosacral           M51.3

7              Active    confirmed

                                        10284591             

 

                Problem         Chronic gout due to renal impairment without top

hus, unspecified site                 

M1A.30X0              Active     confirmed             [id number]  

 

       Problem Mixed hyperlipidemia        E78.2         Active confirmed       

 050118669  

 

       Problem Fibromyalgia        M79.7         Active confirmed        9477978

05  

 

        Problem Hx of malignant neoplasm of kidney         Z85.528         Activ

e  confirmed         

[id number]                                

 

       Problem Amaurosis fugax, both eyes        G45.3         Active confirmed 

       94254201  

 

       Problem Vitamin D deficiency        E55.9         Active confirmed       

 52664748  

 

          Problem   History of non anemic vitamin B12 deficiency           Z86.3

9              Active    confirmed

                                        310952646            

 

        Problem PVD (peripheral vascular disease)         I73.9           Active

  confirmed         [id number]

                                         

 

       Problem Other chronic pain        G89.29        Active confirmed        8

2401874  

 

          Problem   Recurrent colitis due to Clostridium difficile           A04

.71              Active    

confirmed                               220867229            

 

           Problem    Malignant neoplasm of urinary bladder, unspecified site   

         C67.9                 Active

                confirmed                       729219604        

 

       Problem Dizziness        R42           Active confirmed        510782142 

 

 

                Problem         Chronic congestive heart failure, unspecified he

art failure type                 I50.9

                      Active     confirmed             31375814    

 

       Problem Solitary kidney, acquired        Z90.5         Active confirmed  

      [id number]  

 

       Problem Hypercholesterolemia        E78.00        Active confirmed       

 11972108  







ALLERGIES





                    Allergen (clinical drug ingredient) Drug/Non Drug Allergy do

cumented on EMR 

Reaction            Allergy Type        Onset Date          Status

 

           tetracycline Tetracycline HCl(NDC Code:[address]-83) diarrhea   Drug

 Allergy            

Active

 

                      BuSpar     Unknown    Drug Allergy            Active

 

                      Minocins   diarrhea   Non Drug Allergy            Active

 

           nitazoxanide Alinia(NDC Code:[address]-77) Hives      Drug Allergy  

          Active

 

           nitroglycerin Nitrostat(NDC Code:[address]-45) Anaphylaxis Drug Stan

rgy            

Active

 

           evolocumab Repatha(NDC Code:[address]-11) Rash       Drug Allergy   

         Active

 

                      Statins (for Allergy Use Only) muscle soreness weakness 

bao Allergy            Active

 

           carvedilol Carvedilol(NDC Code:[address]-61) Unknown    Drug Allergy

            Active

 

                      Codeine Phosphate (For Allergies Use Only) Hives      Drug

 Allergy            Active

 

                      Butabarbital Sodium Hives      Drug Allergy            Act

juany

 

                sulfamethoxazole / trimethoprim Bactrim(NDC Code:[address]-69) 

Rash            Drug 

Allergy                                             Active

 

                      Trental    sick       Drug Allergy            Active

 

                      Adhesive Tape Hives      Drug Allergy            Active







ENCOUNTERS from 1945 to 2021-10-20





             Encounter    Location     Date         Provider     Diagnosis

 

                [facility]      [address]  [address] [phone] [place] [phone]

4 19 Oct, 2021    Kailee West                                    Chronic congestive heart failure, unspec

ified heart failure type I50.9







IMMUNIZATIONS





                Vaccine         Route           Administration Date Status

 

                Influenza 18 yrs & older Flublok IM Intramuscular Dec 20, 2018  

  Administered

 

                Vitamin B-12 1000mcg/1mL Cyanocobalamin IM Intramuscular Aug 28,

 2019    

Administered

 

                Pneumococcal Adult 0.5mL Pneumovax 23 Unknown         

013   Administered

 

                Influenza 6mo & up Fluzone Unknown         Dec 08, 2015    Refus

ed







SOCIAL HISTORY

Tobacco Use:



                    Social History Observation Description         Date

 

                    Details (start date - stop date) Never Smoker         



Sex Assigned At Birth:



                          Social History Observation Description

 

                          Sex Assigned At Birth     Unknown



Education:



                    Question            Answer              Notes

 

                    Level of Education: Not finished High School  



Audit



                    Question            Answer              Notes

 

                    Total Score:        0                    

 

                    Interpretation:     Alcohol Education    



Language:



                    Question            Answer              Notes

 

                    Languages spoken:   English              



Jainism:



                    Question            Answer              Notes

 

                    Jainism            08 Alevism         



Sexual Hx:



                    Question            Answer              Notes

 

                    Had sex in the last 12 months (vaginal, oral, or anal)? No  

                 

 

                    Have you ever had an STD? No                   



Drug and Alcohol



                    Question            Answer              Notes

 

                    Total Score:        0                    

 

                    Interpretation:     No problems reported  



Alcohol Screening:



                    Question            Answer              Notes

 

                    Did you have a drink containing alcohol in the past year? No

                   

 

                    Points              0                    

 

                    Interpretation      Negative             



Tobacco Use:



                    Question            Answer              Notes

 

                    Are you a:          never smoker        former smoker

 

                    Additional Findings: Tobacco User                     quit 5

 years ago







REASON FOR REFERRAL

No Information



VITAL SIGNS

No information



MEDICATIONS





           Medication SIG (Take, Route, Frequency, Duration) Notes      Start Da

te End Date   

Status

 

           Ranitidine 150 Max Strength 150 MG 1 tablet Orally twice daily       

                           Not-Taking

 

                          Meclizine HCl 25 mg       1 tablet Orally every 8 hour

s as needed for dizziness for 30

day(s)                          27 Dec, 2018                    Active

 

           Valsartan 80 MG 2 tablet Orally                                  Acti

ve

 

           Zinc 50 MG 1 tablet Orally Once a day for 30 day(s)                  

                Active

 

                          traMADol HCl 50 MG        1 tab orally/[id number] three

 times daily as needed for pain 

mdd3 for 30 days                                                 Active

 

           Losartan Potassium-HCTZ 50-12.5 MG 1 tablet Orally Once a day for 90 

days                                  

Not-Taking

 

           Metoprolol Tartrate 25 mg 1 tab Orally twice daily                   

               Active

 

           Ranexa 1000 MG 1 tablet Orally Twice a day for 90 day(s)             

                     Active

 

                          May Have -                please provide new tubing fo

r 2 concentrator. DX code:G47.33 to use 

daily for 99 months                 11 Sep, 2018                    Active

 

           Promethazine HCl 25 mg 1 tablet Orally before bedtime as needed      

                            Active

 

           Vitamin B12 3000 MCG 1 tablet Sublingual Once a day                  

                Active

 

           Lexapro 10 MG 1 tab Orally Once a day                                

  Active

 

           Aspirin 325 MG 1 tablet Orally Once a day                            

      Active

 

                          Nitroglycerin 0.4 MG/HR   1 patch to skin Transdermal 

Daily as needed per 

                      23 Oct, 2018                    Active

 

           Ranexa 1000 MG 1 tablet Orally Twice a day for 30 day(s)            1

9 Oct, 2021            Active

 

           Magnesium 65 MG 2 tablets with a meal Orally Once a day              

                    Active

 

           Acetaminophen 500 MG 1 tablet as needed Orally every 6 hrs           

                       Active

 

           Vitamin D3 125 MCG (5000 UT) 1 capsule Orally weekly                 

                 Active

 

           Allopurinol 300 MG 1 tablet Orally Once a day                        

          Active

 

           Benadryl 25 MG 1 capsule Orally every 12 hours as needed             

                     Active

 

                          Nitroglycerin 0.4 MG      1 tab Sublingual atonset of 

chest pain q 5 min x 3 if no 

relief call 911                                                 Active

 

           Plavix 75 mg 1 tablet Orally Once a day for 90 days                  

                Active

 

           Probiotic - 1 capsule Orally Daily                                  A

ctive

 

                          Amlodipine Besylate 2.5 MG TAKE 1TAB.BY MOUTH TWICE A 

DAY  HOLD DOSE FOR 

SYSTOLIC BLOOD PRESSURE   135 Oral Takeif B/P is over 160 systolic              

                                   Active

 

           Pantoprazole Sodium 40 MG 1 tablet Orally Once a day for 30 day(s)   

                               Active







PROCEDURES

No Information



RESULTS

No Results



REASON FOR VISIT

Pharmancy/refill



MEDICAL (GENERAL) HISTORY





                    Type                Description         Date

 

                    Medical History     No MRI's due to stents in legs and heart

  

 

                          Medical History           Carotid Artery Disease nonco

nclusive Lexiscan cardiolite stress 

test, normal myocardial perfusion, preserved LV function 2016 Hx MI, Dr Guerrero                                   

 

                    Medical History     peripheral vascular disease (carotids, l

ower extremities)  

 

                    Medical History     CVA                  

 

                    Medical History     Hypercholesterolemia  

 

                    Medical History     Bradycardia          

 

                    Medical History     congestive heart failure ? type  

 

                    Medical History     essential hypertension  

 

                    Medical History     syncopal event x1 13  

 

                    Medical History     Migraines            

 

                          Medical History           Sleep apneauses oxygen at 2 

liters q hour sleep does not toerate

cpap                                     

 

                    Medical History     GERD                 

 

                    Medical History     Anxiety/Depression   

 

                    Medical History     fibromyalgia         

 

                    Medical History     colonoscopy 2016 with hemorrhoids a

nd diverticulosis  

 

                          Medical History           papillary urothelial carcino

ma cystoscopy 2018 Dr. Dom Juarez negative cystoscopy 2017  

 

                          Medical History           recurrent C. difficile colit

is C. difficile positive on 18 

negative on 3/8/18 positive on 17 positive on 2013  

 

                    Medical History     degenerative disc disease and spondylosi

s  

 

                    Medical History     hgac  5.2=103    , hga1c 5 2019 

 

 

                    Medical History     Solitary Kidney right kidney  ( left rem

lorena from cancer)  

 

                    Surgical History    triple bypass       

 

                    Surgical History    multiple procedures with cardiac stents 

 st [id number]

 

                    Surgical History    back surgery        1974

 

                    Surgical History    stent right leg     

 

                    Surgical History    right nephrectomy [facility] 

 

                    Surgical History    lumbar 4-5m         

 

                    Surgical History    colon resection-[facility] 3/2018

 

                    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

 

                    Hospitalization History blood transfusion   

 

                    Hospitalization History Kidney stone         

 

                    Hospitalization History for surgeries        







Goals Section

No Information



Health Concerns

No Information



MEDICAL EQUIPMENT

No Information



MENTAL STATUS

No Information



FUNCTIONAL STATUS

No Information



ASSESSMENTS





             Encounter Date Diagnosis    Assessment Notes Treatment Notes Treatm

ent Clinical 

Notes

 

                          19 Oct, 2021              Chronic congestive heart martell

lure, unspecified heart failure type 

(ICD-10 - I50.9)                                    



                                        











PLAN OF TREATMENT

Medication



                Medication Name Sig             Start Date      Stop Date

 

                Lexapro 10 MG   1 tab Orally Once a day                  

 

                Ranexa 1000 MG  1 tablet Orally Twice a day for 30 day(s) 19 Oct

, 2021     

 

                Valsartan 80 MG 2 tablet Orally                  

 

                Allopurinol 300 MG 1 tablet Orally Once a day                  



Next Appt



                                        Details

 

                                        Provider Name:Kailee West, 2021-10-26 03:

00:00 PM, [address] [facility],

[phone], [place], [address], [phone]







Insurance Providers





             Payer Name   Payer Address Payer Phone  Insured Name Patient Relati

onship to 

Insured                   Coverage Start Date       Coverage End Date

 

                          [facility] [employer]  [facility] CLAIM DIV 

[address] [facility] 

[id number] [phone] JACOBO,LORI M self                       

 

                MEDICARE Part A and B  [id number]  [facility] [id number]

[phone]    

LORI JACOBO     self

## 2021-10-26 NOTE — CCD
Summarization of Episode Note

                             Created on: 2021



Dr. LORI JACOBO

External Reference #: [id number]

: 1945

Sex: Female



Demographics





                          Address                   [place] 

[place]  [address]

 

                          Home Phone                [phone]

 

                          Preferred Language        Unknown

 

                          Marital Status            Unknown

 

                          Zoroastrianism Affiliation     Unknown

 

                          Race                      White

 

                          Ethnic Group              Not  or 





Author





                          Author                    [facility]

ems

 

                          Organization              [facility]

ems

 

                          Address                   Unknown

 

                          Phone                     Unavailable







Support





                Name            Relationship    Address         Phone

 

                    LORI JACOBO                [address] 

[place], [place]  [address] [phone]

 

                Trevin Jacob[place]  [address] [phone]







Care Team Providers





                    Care Team Member Name Role                Phone

 

                    Kailee West        Unavailable         [phone]







PROBLEMS





          Type      Condition ICD9-CM Code DSL29-FL Code Onset Dates Condition S

tatus W/U 

Status              Risk                SNOMED Code         Notes

 

       Problem Obstructive sleep apnea        G47.33        Active confirmed    

    [id number]  

 

       Problem Anxiety        F41.9         Active confirmed        [id number] She

 is on Lexapro therapy 

at 20 g daily but she feels that is too strong. She will cut back to 10 mg 
daily.

 

       Problem Essential hypertension        I10           Active confirmed     

   71675874  

 

                          Problem                   Coronary artery disease of b

ypass graft of native heart with stable 

angina pectoris         I25.708         Active  confirmed         [id number] She 

has coronary artery

disease with a history of interventions. She is complaining of dyspnea on 
exertion. She does not desaturate with ambulation. She is on a beta blocker, 
ARB, Ranexa, aspirin and Plavix therapy. She indicates that she had a stress 
test within the last year or so. I've asked her cardiologist to consider 
reassessment of her coronary anatomy and pulmonary pressures in the near future.

 

       Problem Lumbosacral spondylolysis        M43.07        Active confirmed  

      [id number]  

 

       Problem Sleep apnea in adult        G47.30        Active confirmed       

 23434232  

 

                          Problem                   Hypertensive heart and chron

ic kidney disease with heart failure and 

stage 1 through stage 4 chronic kidney disease, or unspecified chronic kidney 
disease         I13.0           Active  confirmed         [id number] She did not 

do well with a change

from her valsartan and hydrochlorothiazide to irbesartan therapy and is back on 
the former with reasonable blood pressure control. I think the 
hydrochlorothiazide was stopped because of her chronic renal disease/solitary 
kidney but I doubt that the low dose of the thiazide has substantially affected 
her kidney function. She will keep her nephrology follow-up.

 

          Problem   Gastro-esophageal reflux disease with esophagitis           

K21.0               Active    

confirmed                               [id number]            

 

          Problem   Other osteoarthritis of spine, lumbar region           M47.8

96             Active    

confirmed                               124790748            

 

        Problem Stenosis of left carotid artery         I65.22          Active  

confirmed         

[id number]                          

 

       Problem Coronary arteriosclerosis        I25.10        Active confirmed  

      54805936  

 

          Problem   DDD (degenerative disc disease), lumbosacral           M51.3

7              Active    confirmed

                                        81800958             

 

                Problem         Chronic gout due to renal impairment without top

hus, unspecified site                 

M1A.30X0              Active     confirmed             [id number]  

 

       Problem Mixed hyperlipidemia        E78.2         Active confirmed       

 791810721  

 

       Problem Fibromyalgia        M79.7         Active confirmed        3993272

05  

 

        Problem Hx of malignant neoplasm of kidney         Z85.528         Activ

e  confirmed         

[id number]                                

 

       Problem Amaurosis fugax, both eyes        G45.3         Active confirmed 

       52554723  

 

       Problem Vitamin D deficiency        E55.9         Active confirmed       

 83134863  

 

          Problem   History of non anemic vitamin B12 deficiency           Z86.3

9              Active    confirmed

                                        113892391            

 

        Problem PVD (peripheral vascular disease)         I73.9           Active

  confirmed         867792276

                                         

 

       Problem Other chronic pain        G89.29        Active confirmed        8

4581596  

 

          Problem   Recurrent colitis due to Clostridium difficile           A04

.71              Active    

confirmed                               170856350            

 

           Problem    Malignant neoplasm of urinary bladder, unspecified site   

         C67.9                 Active

                confirmed                       052290270        

 

       Problem Dizziness        R42           Active confirmed        701965810 

 

 

                Problem         Chronic congestive heart failure, unspecified he

art failure type                 I50.9

                      Active     confirmed             71700534    

 

       Problem Solitary kidney, acquired        Z90.5         Active confirmed  

      061660315  

 

       Problem Hypercholesterolemia        E78.00        Active confirmed       

 57979171  







ALLERGIES





                    Allergen (clinical drug ingredient) Drug/Non Drug Allergy do

cumented on EMR 

Reaction            Allergy Type        Onset Date          Status

 

                      Trental    sick       Drug Allergy            Active

 

                      Minocins   diarrhea   Non Drug Allergy            Active

 

                sulfamethoxazole / trimethoprim Bactrim(NDC Code:[address]-76) 

Rash            Drug 

Allergy                                             Active

 

           tetracycline Tetracycline HCl(NDC Code:[address]-42) diarrhea   Drug

 Allergy            

Active

 

                      nitrostat  Anaphylaxis Non Drug Allergy            Active

 

                      Statins (for Allergy Use Only) muscle soreness weakness Dr

ug Allergy            Active

 

                      BuSpar     Unknown    Drug Allergy            Active

 

                      Adhesive Tape Hives      Drug Allergy            Active

 

                      Codeine Phosphate (For Allergies Use Only) Hives      Drug

 Allergy            Active

 

                      Butabarbital Sodium Hives      Drug Allergy            Act

juany







ENCOUNTERS from 1945 to 2021





             Encounter    Location     Date         Provider     Diagnosis

 

                          [facility]         [address] 

[address] [phone] [place] [address]

                            Kailee West          Hypercholesterolemia

 E78.00 ; Chest pain, unspecified 

R07.9 ; History of non anemic vitamin B12 deficiency Z86.39 ; Other chronic pain
G89.29 and Other specified personal risk factors, not elsewhere classified 
Z91.89







IMMUNIZATIONS





                Vaccine         Route           Administration Date Status

 

                Influenza 18 yrs & older Flublok IM Intramuscular Dec 20, 2018  

  Administered

 

                Vitamin B-12 1000mcg/1mL Cyanocobalamin IM Intramuscular Aug 28,

 2019    

Administered

 

                Pneumococcal Adult 0.5mL Pneumovax 23 Unknown         

013   Administered

 

                Influenza 6mo & up Fluzone Unknown         Dec 08, 2015    Refus

ed







SOCIAL HISTORY

Tobacco Use:



                    Social History Observation Description         Date

 

                    Details (start date - stop date) Never Smoker         



Sex Assigned At Birth:



                          Social History Observation Description

 

                          Sex Assigned At Birth     Unknown



Education:



                    Question            Answer              Notes

 

                    Level of Education: Not finished High School  



Audit



                    Question            Answer              Notes

 

                    Total Score:        0                    

 

                    Interpretation:     Alcohol Education    



Language:



                    Question            Answer              Notes

 

                    Languages spoken:   English              



Jain:



                    Question            Answer              Notes

 

                    Jain            [address]         



Sexual Hx:



                    Question            Answer              Notes

 

                    Had sex in the last 12 months (vaginal, oral, or anal)? No  

                 

 

                    Have you ever had an STD? No                   



Drug and Alcohol



                    Question            Answer              Notes

 

                    Total Score:        0                    

 

                    Interpretation:     No problems reported  



Alcohol Screening:



                    Question            Answer              Notes

 

                    Did you have a drink containing alcohol in the past year? No

                   

 

                    Points              0                    

 

                    Interpretation      Negative             



Tobacco Use:



                    Question            Answer              Notes

 

                    Are you a:          never smoker        former smoker

 

                    Additional Findings: Tobacco User                     quit 5

 years ago







REASON FOR REFERRAL

No Information



VITAL SIGNS





                    Weight              158.00 lbs          

 

                    Height              61 in               

 

                    BMI                 29.85 kg/m2         

 

                    Heart Rate          88 /min             

 

                    Respiratory Rate    18 /min             

 

                    Temperature         96.9 degrees Fahrenheit 

 

                    Oximetry            98                  

 

                    Blood pressure systolic 138 mm Hg           

 

                    Blood pressure diastolic 82 mm Hg            







MEDICATIONS





           Medication SIG (Take, Route, Frequency, Duration) Notes      Start Da

te End Date   

Status

 

                          May Have -                please provide new tubing fo

r 2 concentrator. DX code:G47.33 to use 

daily for 99 months                 11 Sep, 2018                    Active

 

                Nitroglycerin 0.4 MG/HR 1 patch to skin Transdermal Daily for 90

 day(s)                 23 

Oct, 2018                                           Active

 

           Ranitidine 150 Max Strength 150 MG 1 tablet Orally twice daily       

                           Active

 

           Vitamin D3 81014 UNIT 1 capsule Orally weekly                        

          Active

 

           Promethazine HCl 25 mg 1 tablet Orally before bedtime as needed      

                            Active

 

           Ranexa 1000 MG 1 tablet Orally Twice a day for 90 day(s)             

                     Active

 

           Allopurinol 300 MG 1 tablet Orally Once a day                        

          Active

 

           Metoprolol Tartrate 25 mg 1 tab Orally twice daily for 90 day(s)     

                             Active

 

                          Meclizine HCl 25 mg       1 tablet Orally every 8 hour

s as needed for dizziness for 30

day(s)                          27 Dec, 2018                    Active

 

                          Nitroglycerin 0.4 MG      1 tab Sublingual atonset of 

chest pain q 5 min x 3 if no 

relief call 911                                                 Active

 

           Vitamin B12 500 MCG 1 tablet Orally Once a day                       

           Active

 

           Lexapro 20 MG 1 tab Orally Once a day                                

  Active

 

           Probiotic - 1 capsule Orally Daily                                  A

ctive

 

           Benadryl 25 MG 1 capsule Orally every 12 hours as needed             

                     Active

 

                          traMADol HCl 50 MG        1 tab orally/[id number] three

 times daily as needed for pain 

mdd3 for 30 days                                                 Active

 

           Aspirin 325 MG 1 tablet Orally Once a day                            

      Active

 

                          Amlodipine Besylate 2.5 MG TAKE 1TAB.BY MOUTH TWICE A 

DAY  HOLD DOSE FOR 

SYSTOLIC BLOOD PRESSURE   135 Oral for 90                                       

          Active

 

           Losartan Potassium-HCTZ 50-12.5 MG 1 tablet Orally Once a day for 90 

days                                  

Active

 

           Plavix 75 mg 1 tablet Orally Once a day                              

    Active







PROCEDURES

No Information



RESULTS





                    Component           Value               Reference Range

 

                                        LIPID PANEL (CARDIAC RISK)

Reviewed date:2021 19:12:16

Interpretation:

Performing Lab:[facility] LABORATORY [address] [facility] [address] [phone], Phone:[phone],[place] [address] 

 

                    TRIGLYCERIDES LEVEL 316                 <150

 

                    CHOLESTEROL LEVEL   191                 <200

 

                    HDL CHOLESTEROL     43                  >40

 

                    LDL CHOLESTEROL     85                  <100

 

                    NON-HDL-C           148                  

 

                    CHOLESTEROL RISK RATIO 4.441               <5

 

                                        VITAMIN B12 LEVEL

Reviewed date:2021 19:12:34

Interpretation:

Performing Lab:[facility] LABORATORY [address] [address] [phone], Phone:[phone],[place] [address] 

 

                    VITAMIN B12 LEVEL   [phone]







REASON FOR VISIT

transfer of care from [facility]



MEDICAL (GENERAL) HISTORY





                    Type                Description         Date

 

                    Medical History     No MRI's due to stents in legs and heart

  

 

                          Medical History           Carotid Artery Disease nonco

nclusive Lexiscan cardiolite stress 

test, normal myocardial perfusion, preserved LV function 2016 Hx MI, Dr Guerrero                                   

 

                    Medical History     peripheral vascular disease (carotids, l

ower extremities)  

 

                    Medical History     CVA                  

 

                    Medical History     Hypercholesterolemia  

 

                    Medical History     Bradycardia          

 

                    Medical History     congestive heart failure ? type  

 

                    Medical History     essential hypertension  

 

                    Medical History     syncopal event x1 13  

 

                    Medical History     Migraines            

 

                          Medical History           Sleep apneauses oxygen at 2 

liters q hour sleep does not toerate

cpap                                     

 

                    Medical History     GERD                 

 

                    Medical History     Anxiety/Depression   

 

                    Medical History     fibromyalgia         

 

                    Medical History     colonoscopy 2016 with hemorrhoids a

nd diverticulosis  

 

                          Medical History           papillary urothelial carcino

ma cystoscopy 2018 Dr. Dom Juarez negative cystoscopy 2017  

 

                          Medical History           recurrent C. difficile colit

is C. difficile positive on 18 

negative on 3/8/18 positive on 17 positive on 2013  

 

                    Medical History     degenerative disc disease and spondylosi

s  

 

                    Medical History     hgac  5.2=103    , hga1c 5 2019 

 

 

                    Medical History     Solitary Kidney right kidney  ( left rem

lorena from cancer)  

 

                    Surgical History    triple bypass       

 

                    Surgical History    multiple procedures with cardiac stents 

 st [id number]

 

                    Surgical History    back surgery        

 

                    Surgical History    stent right leg     

 

                    Surgical History    right nephrectomy [facility] 

 

                    Surgical History    lumbar 4-5m         

 

                    Surgical History    colon resection[facility] 3/2018

 

                    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

 

                    Hospitalization History blood transfusion   

 

                    Hospitalization History Kidney stone         

 

                    Hospitalization History for surgeries        







Goals Section

No Information



Health Concerns

No Information



MEDICAL EQUIPMENT

No Information



MENTAL STATUS

No Information



FUNCTIONAL STATUS

No Information



ASSESSMENTS





             Encounter Date Diagnosis    Assessment Notes Treatment Notes Treatm

ent Clinical 

Notes

 

                    Hypercholesterolemia (ICD-10 - E78.00)          

       



                                        



The patient states that she has not used statin because of her risk of muscle 
pain that is very common in her family.  Patient is about 72 years of age and 
ASCVD risk cannot be calculated however I believe the patient should be on a 
statin.  She was asked to start a low-dose statin slowly with 1 dose per week 
and then slowly building up on it along with that patient was also told that a 
separate medication called his coenzyme Q can be given which helps decrease the 
risk of myopathy.



Patient understands the risks but does not want to be on a statin.  She 
declined.



Patient's most recent labs were old so I have sent in a new set of labs to check
lipids.

 

                    Chest pain, unspecified (ICD-10 - R07.9)        

         



                                        



Patient states this 3 out of 10 sharp pain worsened with eating has been checked
out by cardiology.  She saw Dr. Ballard who has scheduled her for a stress test 
and echo in the coming 2 weeks.  Patient has no ongoing symptoms here in the 
office with no palpitations/shortness of breath/cough/PND/orthopnea/bilateral 
pitting edema.



Patient was asked to report to the ED in case the symptoms recur or worsen with 
associated symptoms of shortness of breath palpitations dizziness.  Since the 
patient is high risk she was asked to not wait.  Patient verbalized 
understanding however she wants to follow it up with Dr. Mann in the office 
and states she is already scheduled for testing.



We will follow up on her cardiology visit.

 

                    History of non anemic vitamin B12 deficiency (IC

D-10 - Z86.39)                 



                                        



Patient has been receiving IV injections of vitamin B12 in the office. 



To assess the levels of vitamin B12 I sent in labs today to check her levels.  
Patient also takes vitamin B12 orally at home.



Based on those results I will make the decision of whether she needs injections 
for vitamin B12 the next visit.



Patient denies any new neurological symptoms numbness or tingling

 

                    Other chronic pain (ICD-10 - G89.29)            

     



                                        





 

                                        Other specified personal ris

k factors, not elsewhere classified 

(ICD-10 - Z91.89)                                   



                                        











PLAN OF TREATMENT

Treatment Notes



                    Assessment          Notes               Clinical Notes

 

                    Hypercholesterolemia                     The patient states 

that she has not used statin because 

of her risk of muscle pain that is very common in her family.  Patient is about 
72 years of age and ASCVD risk cannot be calculated however I believe the 
patient should be on a statin.  She was asked to start a low-dose statin slowly 
with 1 dose per week and then slowly building up on it along with that patient 
was also told that a separate medication called his coenzyme Q can be given 
which helps decrease the risk of myopathy.Patient understands the risks but does
not want to be on a statin.  She declined.Patient's most recent labs were old so
I have sent in a new set of labs to check lipids.

 

                    Chest pain, unspecified                     Patient states t

his 3 out of 10 sharp pain worsened 

with eating has been checked out by cardiology.  She saw Dr. Ballard who has 
scheduled her for a stress test and echo in the coming 2 weeks.  Patient has no 
ongoing symptoms here in the office with no palpitations/shortness of 
breath/cough/PND/orthopnea/bilateral pitting edema.Patient was asked to report 
to the ED in case the symptoms recur or worsen with associated symptoms of 
shortness of breath palpitations dizziness.  Since the patient is high risk she 
was asked to not wait.  Patient verbalized understanding however she wants to 
follow it up with Dr. Mnan in the office and states she is already scheduled
for testing.We will follow up on her cardiology visit.

 

                    History of non anemic vitamin B12 deficiency                

     Patient has been receiving IV 

injections of vitamin B12 in the office.To assess the levels of vitamin B12 I 
sent in labs today to check her levels.  Patient also takes vitamin B12 orally 
at home.Based on those results I will make the decision of whether she needs 
injections for vitamin B12 the next visit.Patient denies any new neurological 
symptoms numbness or tingling



Next Appt



                                        Details

 

                                        3 Months Reason:Annual physical



Follow Up:3 MonthsAnnual physical



Insurance Providers





             Payer Name   Payer Address Payer Phone  Insured Name Patient Relati

onship to 

Insured                   Coverage Start Date       Coverage End Date

 

                MEDICARE Part A and B [address]  [facility] [id number]

[phone]    

LORI JACOBO                                     

 

                          [facility] [employer]  [facility] CLAIM DIV 

[address] [facility] 

[address]   [phone] LORI JACOBO

## 2021-10-27 VITALS — OXYGEN SATURATION: 97 %

## 2021-10-27 VITALS — OXYGEN SATURATION: 96 %

## 2021-10-27 VITALS — SYSTOLIC BLOOD PRESSURE: 137 MMHG | DIASTOLIC BLOOD PRESSURE: 60 MMHG

## 2021-10-27 VITALS — DIASTOLIC BLOOD PRESSURE: 67 MMHG | SYSTOLIC BLOOD PRESSURE: 154 MMHG

## 2021-10-27 VITALS — DIASTOLIC BLOOD PRESSURE: 72 MMHG | SYSTOLIC BLOOD PRESSURE: 169 MMHG

## 2021-10-27 VITALS — SYSTOLIC BLOOD PRESSURE: 137 MMHG | DIASTOLIC BLOOD PRESSURE: 63 MMHG

## 2021-10-27 VITALS — DIASTOLIC BLOOD PRESSURE: 56 MMHG | SYSTOLIC BLOOD PRESSURE: 122 MMHG

## 2021-10-27 VITALS — OXYGEN SATURATION: 95 %

## 2021-10-27 VITALS — OXYGEN SATURATION: 98 %

## 2021-10-27 VITALS — OXYGEN SATURATION: 94 %

## 2021-10-27 LAB
ANISOCYTOSIS BLD QL SMEAR: (no result)
APPEARANCE UR: (no result)
BACTERIA UR QL AUTO: (no result)
BILIRUB UR QL STRIP.AUTO: NEGATIVE
BUN SERPL-MCNC: 37 MG/DL (ref 7–18)
CALCIUM SERPL-MCNC: 7.9 MG/DL (ref 8.8–10.2)
CHLORIDE SERPL-SCNC: 111 MEQ/L (ref 98–107)
CO2 SERPL-SCNC: 24 MEQ/L (ref 21–32)
CREAT SERPL-MCNC: 1.49 MG/DL (ref 0.55–1.3)
GFR SERPL CREATININE-BSD FRML MDRD: 36.1 ML/MIN/{1.73_M2} (ref 39–?)
GLUCOSE SERPL-MCNC: 168 MG/DL (ref 70–100)
GLUCOSE UR QL STRIP.AUTO: NEGATIVE MG/DL
HCT VFR BLD AUTO: 27.8 % (ref 36–47)
HGB BLD-MCNC: 9.2 G/DL (ref 12–15.5)
HGB UR QL STRIP.AUTO: NEGATIVE
KETONES UR QL STRIP.AUTO: NEGATIVE MG/DL
LEUKOCYTE ESTERASE UR QL STRIP.AUTO: NEGATIVE
LYMPHOCYTES NFR BLD MANUAL: 38 % (ref 16–44)
MACROCYTES BLD QL SMEAR: (no result)
MAGNESIUM SERPL-MCNC: 2.4 MG/DL (ref 1.8–2.4)
MCH RBC QN AUTO: 36.2 PG (ref 27–33)
MCHC RBC AUTO-ENTMCNC: 33.1 G/DL (ref 32–36.5)
MCV RBC AUTO: 109.4 FL (ref 80–96)
MONOCYTES NFR BLD MANUAL: 12 % (ref 0–5)
NEUTROPHILS NFR BLD MANUAL: 26 % (ref 28–66)
NITRITE UR QL STRIP.AUTO: POSITIVE
PH UR STRIP.AUTO: 5 UNITS (ref 5–9)
PLATELET # BLD AUTO: 80 10^3/UL (ref 150–450)
PLATELET BLD QL SMEAR: (no result)
POTASSIUM SERPL-SCNC: 5 MEQ/L (ref 3.5–5.1)
PROT UR QL STRIP.AUTO: NEGATIVE MG/DL
RBC # BLD AUTO: 2.54 10^6/UL (ref 4–5.4)
RBC # UR AUTO: 1 /HPF (ref 0–3)
SODIUM SERPL-SCNC: 138 MEQ/L (ref 136–145)
SP GR UR STRIP.AUTO: 1.01 (ref 1–1.03)
SQUAMOUS #/AREA URNS AUTO: 2 /HPF (ref 0–6)
UROBILINOGEN UR QL STRIP.AUTO: 0.2 MG/DL (ref 0–2)
VARIANT LYMPHS NFR BLD MANUAL: 2 % (ref 0–5)
WBC # BLD AUTO: 1.5 10^3/UL (ref 4–10)
WBC #/AREA URNS AUTO: 1 /HPF (ref 0–3)

## 2021-10-27 RX ADMIN — RANOLAZINE SCH MG: 500 TABLET, FILM COATED, EXTENDED RELEASE ORAL at 22:56

## 2021-10-27 RX ADMIN — PROBIOTIC PRODUCT - TAB SCH EA: TAB at 17:57

## 2021-10-27 RX ADMIN — PANTOPRAZOLE SODIUM SCH MG: 40 TABLET, DELAYED RELEASE ORAL at 10:06

## 2021-10-27 RX ADMIN — METOPROLOL SUCCINATE SCH MG: 25 TABLET, EXTENDED RELEASE ORAL at 10:09

## 2021-10-27 RX ADMIN — METOPROLOL SUCCINATE SCH MG: 25 TABLET, EXTENDED RELEASE ORAL at 22:56

## 2021-10-27 RX ADMIN — PROBIOTIC PRODUCT - TAB SCH EA: TAB at 22:55

## 2021-10-27 RX ADMIN — DEXAMETHASONE SODIUM PHOSPHATE SCH MG: 4 INJECTION, SOLUTION INTRAMUSCULAR; INTRAVENOUS at 17:57

## 2021-10-27 RX ADMIN — VALSARTAN SCH MG: 80 TABLET ORAL at 09:00

## 2021-10-27 RX ADMIN — ASPIRIN SCH MG: 325 TABLET, COATED ORAL at 10:57

## 2021-10-27 RX ADMIN — PROBIOTIC PRODUCT - TAB SCH EA: TAB at 10:06

## 2021-10-27 RX ADMIN — ESCITALOPRAM OXALATE SCH MG: 10 TABLET, FILM COATED ORAL at 10:05

## 2021-10-27 RX ADMIN — SODIUM CHLORIDE SCH ML: 9 INJECTION, SOLUTION INTRAMUSCULAR; INTRAVENOUS; SUBCUTANEOUS at 22:57

## 2021-10-27 RX ADMIN — CLOPIDOGREL BISULFATE SCH MG: 75 TABLET ORAL at 10:06

## 2021-10-27 RX ADMIN — SODIUM CHLORIDE SCH MLS/HR: 9 INJECTION, SOLUTION INTRAVENOUS at 03:20

## 2021-10-27 RX ADMIN — PROBIOTIC PRODUCT - TAB SCH EA: TAB at 12:34

## 2021-10-27 RX ADMIN — SODIUM CHLORIDE SCH MLS/HR: 9 INJECTION, SOLUTION INTRAVENOUS at 22:55

## 2021-10-27 RX ADMIN — MECLIZINE HYDROCHLORIDE SCH MG: 25 TABLET ORAL at 10:06

## 2021-10-27 RX ADMIN — RANOLAZINE SCH MG: 500 TABLET, FILM COATED, EXTENDED RELEASE ORAL at 10:06

## 2021-10-27 RX ADMIN — IPRATROPIUM BROMIDE AND ALBUTEROL SCH PUFF: 20; 100 SPRAY, METERED RESPIRATORY (INHALATION) at 08:00

## 2021-10-27 RX ADMIN — SODIUM CHLORIDE SCH MLS/HR: 9 INJECTION, SOLUTION INTRAVENOUS at 04:43

## 2021-10-27 RX ADMIN — IPRATROPIUM BROMIDE AND ALBUTEROL SCH PUFF: 20; 100 SPRAY, METERED RESPIRATORY (INHALATION) at 18:26

## 2021-10-27 RX ADMIN — FAMOTIDINE SCH MG: 20 TABLET, FILM COATED ORAL at 22:55

## 2021-10-27 NOTE — IPNPDOC
Text Note


Date of Service


The patient was seen on 10/27/21.





NOTE


SUBJECTIVE:


-No acute complaints





OBJECTIVE:


VITAL SIGNS: see below


GENERAL APPEARANCE: NAD, ill appearing


HEENT: NCAT, EOMI, MMM


CARDIOVASCULAR: RRR, no m/r/g


LUNGS: CTAB, tachypneic, on 3L NC, diminished breath sounds, no crackles, no 

wheezing or rhonchi


ABDOMEN: Normoactive bowel sounds, soft, NTND


EXTREMITIES: WWP, no LE edema


NEUROLOGICAL: CM3-12 intact, nonfocal examination


PSYCHIATRIC: AOx3





LABORATORY DATA: 


WBC 1.5


hgb 9.2


platelets 80


na 138


K 5


Cr 1.49


Mag 2.4





IMAGING:





CXR:





Mild scattered interstitial fibrotic changes noted.  There is no definite 

superimposed


acute infiltrate.  The heart is not significantly enlarged.  The mediastinal


silhouette is unchanged with some calcific plaque in the thoracic aorta.  

Multiple


sternal wires and mediastinal clips are present.





IMPRESSION:


Stable chronic findings.  No evidence of acute infiltrate.








MICROBIOLOGY: Please see below. 





ASSESSMENT: 


76 yo W with a history of CAD s/p CABG x 3, and thereafter PCI with coronary 

stents, PVD with LE stents, carotid artery disease, angina, HLD, HTN, CHF?, 

history of recurrent Cdiff s/p fecal transplant in 2018, CKD with solitary R 

kidney, history of papillary urothelial CA, who lives at home with her  

and they were ill for approximately 2 weeks and now admitted for covid-19 PNA 

with related pancytopenia, dehydration and lactic acidosis.





PLAN:





Covid-19 PNA: hypoxemia, fever, dyspnea, elevated inflammatory markers


-continue remdesevir and dexamethasone, day 2


-supplemental O2 to goal >89%


-PRN albuterol q4hp


-BID combivent


-encourage awake proning


-q4h sats with vitals


-incentive spirometry


-inflammatory markers per covid protocol


-PRN ibuprofen for fever


-tramadol per home script for severe pain


-s/p IVF








Potential superimposed CAP given fever, elevated procal, despite negative 

imaging


-will give empiric levofloxacin PO, day 1


-urine strep, legionella and check mycoplasma


-if she is expectorating mucus, will send sputum Cx





Lactic acidosis: i/s/o dehydration and severe illness, resolved


-s/p IVF





CKD: stable


-daily BMP





HTN:


-continue home ARB and toprol XL





Diarrhea: likely 2/2 covid 


-GI panel negative


-continue home probiotic as ACHS





GERD:


-continue H2B and PPI per home script





CAD with chronic angina, PVD with stents:


-continue ASA, plavix, ranolazine, PRN SLN, toprol XL





Depression:


-continue home lexapro





Hx of gout:


-continue home allopurinol





Hypomagnesemia:


-replete PRN





Chronic anemia:


-to continue home B12 on discharge 





Pancytopenia likely 2/2 covid-19 infection


-monitor daily CBC, goal hgb >8, platelets >20 if febrile, >50 if bleeding, 

otherwise platelets >10





DVT ppx: TEDs and SCDs





Deconditioning:


-PT/OT





VS,Fishbone, I+O


VS, Fishbone, I+O


Laboratory Tests


10/26/21 11:48








10/27/21 06:47











Vital Signs








  Date Time  Temp Pulse Resp B/P (MAP) Pulse Ox O2 Delivery O2 Flow Rate FiO2


 


10/27/21 07:41 97.9 60 18 137/63 (87) 98 Nasal Cannula 3.0 














I&O- Last 24 Hours up to 6 AM 


 


 10/27/21





 06:00


 


Intake Total 2200 ml


 


Output Total 100 ml


 


Balance 2100 ml

















SARAH BUSTAMANTE MD   Oct 27, 2021 09:01

## 2021-10-28 VITALS — DIASTOLIC BLOOD PRESSURE: 58 MMHG | SYSTOLIC BLOOD PRESSURE: 126 MMHG

## 2021-10-28 VITALS — DIASTOLIC BLOOD PRESSURE: 65 MMHG | SYSTOLIC BLOOD PRESSURE: 151 MMHG

## 2021-10-28 VITALS — SYSTOLIC BLOOD PRESSURE: 126 MMHG | DIASTOLIC BLOOD PRESSURE: 58 MMHG

## 2021-10-28 LAB
ALBUMIN SERPL BCG-MCNC: 2.7 GM/DL (ref 3.2–5.2)
ALT SERPL W P-5'-P-CCNC: 23 U/L (ref 12–78)
APTT BLD: 36 SECONDS (ref 25.9–37)
BILIRUB CONJ SERPL-MCNC: 0.2 MG/DL (ref 0–0.2)
BILIRUB SERPL-MCNC: 0.3 MG/DL (ref 0.2–1)
BUN SERPL-MCNC: 41 MG/DL (ref 7–18)
CALCIUM SERPL-MCNC: 8.2 MG/DL (ref 8.8–10.2)
CHLORIDE SERPL-SCNC: 112 MEQ/L (ref 98–107)
CK SERPL-CCNC: 108 U/L (ref 26–192)
CO2 SERPL-SCNC: 23 MEQ/L (ref 21–32)
CREAT SERPL-MCNC: 1.43 MG/DL (ref 0.55–1.3)
FERRITIN SERPL-MCNC: 507 NG/ML (ref 8–252)
FIBRINOGEN PPP-MCNC: 447 MG/DL (ref 268–480)
GFR SERPL CREATININE-BSD FRML MDRD: 37.9 ML/MIN/{1.73_M2} (ref 39–?)
GLUCOSE SERPL-MCNC: 164 MG/DL (ref 70–100)
HCT VFR BLD AUTO: 27.2 % (ref 36–47)
HGB BLD-MCNC: 9.1 G/DL (ref 12–15.5)
INR PPP: 1.11
LDH SERPL L TO P-CCNC: 320 U/L (ref 84–246)
LYMPHOCYTES NFR BLD MANUAL: 9 % (ref 16–44)
MAGNESIUM SERPL-MCNC: 1.7 MG/DL (ref 1.8–2.4)
MCH RBC QN AUTO: 36.4 PG (ref 27–33)
MCHC RBC AUTO-ENTMCNC: 33.5 G/DL (ref 32–36.5)
MCV RBC AUTO: 108.8 FL (ref 80–96)
METAMYELOCYTES NFR BLD MANUAL: 4 % (ref 0–0)
MONOCYTES NFR BLD MANUAL: 4 % (ref 0–5)
NEUTROPHILS NFR BLD MANUAL: 68 % (ref 28–66)
NT-PRO BNP: 855 PG/ML (ref ?–450)
PLATELET # BLD AUTO: 90 10^3/UL (ref 150–450)
PLATELET BLD QL SMEAR: (no result)
POTASSIUM SERPL-SCNC: 5.3 MEQ/L (ref 3.5–5.1)
PROT SERPL-MCNC: 6.6 GM/DL (ref 6.4–8.2)
PROTHROMBIN TIME: 14.7 SECONDS (ref 12.7–14.5)
RBC # BLD AUTO: 2.5 10^6/UL (ref 4–5.4)
RBC MORPH BLD: NORMAL
SODIUM SERPL-SCNC: 138 MEQ/L (ref 136–145)
TROPONIN I SERPL-MCNC: < 0.02 NG/ML (ref ?–0.1)
VARIANT LYMPHS NFR BLD MANUAL: 2 % (ref 0–5)
WBC # BLD AUTO: 3.3 10^3/UL (ref 4–10)

## 2021-10-28 RX ADMIN — METOPROLOL SUCCINATE SCH MG: 25 TABLET, EXTENDED RELEASE ORAL at 20:37

## 2021-10-28 RX ADMIN — DEXAMETHASONE SODIUM PHOSPHATE SCH MG: 4 INJECTION, SOLUTION INTRAMUSCULAR; INTRAVENOUS at 18:08

## 2021-10-28 RX ADMIN — VALSARTAN SCH MG: 80 TABLET ORAL at 08:51

## 2021-10-28 RX ADMIN — RANOLAZINE SCH MG: 500 TABLET, FILM COATED, EXTENDED RELEASE ORAL at 20:36

## 2021-10-28 RX ADMIN — RANOLAZINE SCH MG: 500 TABLET, FILM COATED, EXTENDED RELEASE ORAL at 08:51

## 2021-10-28 RX ADMIN — PANTOPRAZOLE SODIUM SCH MG: 40 TABLET, DELAYED RELEASE ORAL at 08:52

## 2021-10-28 RX ADMIN — FAMOTIDINE SCH MG: 20 TABLET, FILM COATED ORAL at 20:36

## 2021-10-28 RX ADMIN — ESCITALOPRAM OXALATE SCH MG: 10 TABLET, FILM COATED ORAL at 08:52

## 2021-10-28 RX ADMIN — PROBIOTIC PRODUCT - TAB SCH EA: TAB at 20:36

## 2021-10-28 RX ADMIN — SODIUM CHLORIDE SCH ML: 9 INJECTION, SOLUTION INTRAMUSCULAR; INTRAVENOUS; SUBCUTANEOUS at 20:36

## 2021-10-28 RX ADMIN — SODIUM CHLORIDE SCH MLS/HR: 9 INJECTION, SOLUTION INTRAVENOUS at 20:36

## 2021-10-28 RX ADMIN — CLOPIDOGREL BISULFATE SCH MG: 75 TABLET ORAL at 08:52

## 2021-10-28 RX ADMIN — PROBIOTIC PRODUCT - TAB SCH EA: TAB at 18:08

## 2021-10-28 RX ADMIN — PROBIOTIC PRODUCT - TAB SCH EA: TAB at 08:51

## 2021-10-28 RX ADMIN — IPRATROPIUM BROMIDE AND ALBUTEROL SCH PUFF: 20; 100 SPRAY, METERED RESPIRATORY (INHALATION) at 20:00

## 2021-10-28 RX ADMIN — PROBIOTIC PRODUCT - TAB SCH EA: TAB at 12:30

## 2021-10-28 RX ADMIN — METOPROLOL SUCCINATE SCH MG: 25 TABLET, EXTENDED RELEASE ORAL at 08:52

## 2021-10-28 RX ADMIN — IPRATROPIUM BROMIDE AND ALBUTEROL SCH PUFF: 20; 100 SPRAY, METERED RESPIRATORY (INHALATION) at 08:33

## 2021-10-28 RX ADMIN — ASPIRIN SCH MG: 325 TABLET, COATED ORAL at 08:51

## 2021-10-28 RX ADMIN — MECLIZINE HYDROCHLORIDE SCH MG: 25 TABLET ORAL at 08:52

## 2021-10-28 NOTE — IPNPDOC
Text Note


Date of Service


The patient was seen on 10/28/21.





NOTE


SUBJECTIVE:


-No acute complaints





OBJECTIVE:


VITAL SIGNS: see below


GENERAL APPEARANCE: NAD, ill appearing


HEENT: NCAT, EOMI, MMM


CARDIOVASCULAR: RRR, no m/r/g


LUNGS: CTAB, tachypneic, on 3L NC, diminished breath sounds, no crackles, no 

wheezing or rhonchi


ABDOMEN: Normoactive bowel sounds, soft, NTND


EXTREMITIES: WWP, no LE edema


NEUROLOGICAL: CM3-12 intact, nonfocal examination


PSYCHIATRIC: AOx3





LABORATORY DATA: 


WBC 3.3


hgb 9.1


platelets 90


Cr 1.43


Mag 1.7





IMAGING:





CXR:





Mild scattered interstitial fibrotic changes noted.  There is no definite 

superimposed


acute infiltrate.  The heart is not significantly enlarged.  The mediastinal


silhouette is unchanged with some calcific plaque in the thoracic aorta.  

Multiple


sternal wires and mediastinal clips are present.





IMPRESSION:


Stable chronic findings.  No evidence of acute infiltrate.








MICROBIOLOGY: Please see below. 





ASSESSMENT: 


74 yo W with a history of CAD s/p CABG x 3, and thereafter PCI with coronary 

stents, PVD with LE stents, carotid artery disease, angina, HLD, HTN, CHF?, 

history of recurrent Cdiff s/p fecal transplant in 2018, CKD with solitary R 

kidney, history of papillary urothelial CA, who lives at home with her  

and they were ill for approximately 2 weeks and now admitted for covid-19 PNA 

with related pancytopenia, dehydration and lactic acidosis.





PLAN:





Covid-19 PNA: hypoxemia, fever, dyspnea, elevated inflammatory markers


-continue remdesevir and dexamethasone, day 3


-supplemental O2 to goal >89%


-PRN albuterol q4hp


-BID combivent


-encourage awake proning


-q4h sats with vitals


-incentive spirometry


-inflammatory markers per covid protocol


-PRN ibuprofen for fever


-tramadol per home script for severe pain


-s/p IVF








Potential superimposed CAP given fever, elevated procal, despite negative 

imaging


-will give empiric levofloxacin PO, day 2


-urine strep, legionella and check mycoplasma


-if she is expectorating mucus, will send sputum Cx





Lactic acidosis: i/s/o dehydration and severe illness, resolved


-s/p IVF





CKD: stable


-daily BMP





HTN:


-continue home ARB and toprol XL





Diarrhea: likely 2/2 covid 


-GI panel negative


-continue home probiotic as ACHS





GERD:


-continue H2B and PPI per home script





CAD with chronic angina, PVD with stents:


-continue ASA, plavix, ranolazine, PRN SLN, toprol XL





Depression:


-continue home lexapro





Hx of gout:


-continue home allopurinol





Hypomagnesemia:


-replete PRN





Chronic anemia:


-to continue home B12 on discharge 





Pancytopenia likely 2/2 covid-19 infection: improving


-monitor daily CBC, goal hgb >8, platelets >20 if febrile, >50 if bleeding, 

otherwise platelets >10





DVT ppx: TEDs and SCDs





Deconditioning:


-PT/OT





VS,Fishbone, I+O


VS, Fishbone, I+O


Laboratory Tests


10/28/21 05:59











Vital Signs








  Date Time  Temp Pulse Resp B/P (MAP) Pulse Ox O2 Delivery O2 Flow Rate FiO2


 


10/28/21 09:00       3.0 


 


10/28/21 08:52  66  146/67    


 


10/28/21 04:00 98.2  18  98 Nasal Cannula  














I&O- Last 24 Hours up to 6 AM 


 


 10/28/21





 05:59


 


Intake Total 1610 ml


 


Output Total 2100 ml


 


Balance -490 ml

















SARAH BUSTAMANTE MD   Oct 28, 2021 14:38

## 2021-10-29 VITALS — SYSTOLIC BLOOD PRESSURE: 123 MMHG | DIASTOLIC BLOOD PRESSURE: 64 MMHG

## 2021-10-29 VITALS — DIASTOLIC BLOOD PRESSURE: 57 MMHG | SYSTOLIC BLOOD PRESSURE: 127 MMHG

## 2021-10-29 VITALS — SYSTOLIC BLOOD PRESSURE: 145 MMHG | DIASTOLIC BLOOD PRESSURE: 66 MMHG

## 2021-10-29 VITALS — SYSTOLIC BLOOD PRESSURE: 133 MMHG | DIASTOLIC BLOOD PRESSURE: 72 MMHG

## 2021-10-29 VITALS — OXYGEN SATURATION: 91 %

## 2021-10-29 VITALS — DIASTOLIC BLOOD PRESSURE: 65 MMHG | SYSTOLIC BLOOD PRESSURE: 150 MMHG

## 2021-10-29 VITALS — OXYGEN SATURATION: 94 %

## 2021-10-29 VITALS — OXYGEN SATURATION: 98 %

## 2021-10-29 LAB
BUN SERPL-MCNC: 43 MG/DL (ref 7–18)
CALCIUM SERPL-MCNC: 8.6 MG/DL (ref 8.8–10.2)
CHLORIDE SERPL-SCNC: 109 MEQ/L (ref 98–107)
CO2 SERPL-SCNC: 22 MEQ/L (ref 21–32)
CREAT SERPL-MCNC: 1.51 MG/DL (ref 0.55–1.3)
GFR SERPL CREATININE-BSD FRML MDRD: 35.6 ML/MIN/{1.73_M2} (ref 39–?)
GLUCOSE SERPL-MCNC: 160 MG/DL (ref 70–100)
HCT VFR BLD AUTO: 30.2 % (ref 36–47)
HGB BLD-MCNC: 10.3 G/DL (ref 12–15.5)
LYMPHOCYTES NFR BLD MANUAL: 12 % (ref 16–44)
MAGNESIUM SERPL-MCNC: 1.5 MG/DL (ref 1.8–2.4)
MCH RBC QN AUTO: 37.5 PG (ref 27–33)
MCHC RBC AUTO-ENTMCNC: 34.1 G/DL (ref 32–36.5)
MCV RBC AUTO: 109.8 FL (ref 80–96)
METAMYELOCYTES NFR BLD MANUAL: 2 % (ref 0–0)
MONOCYTES NFR BLD MANUAL: 8 % (ref 0–5)
NEUTROPHILS NFR BLD MANUAL: 70 % (ref 28–66)
PLATELET # BLD AUTO: 109 10^3/UL (ref 150–450)
PLATELET BLD QL SMEAR: NORMAL
POTASSIUM SERPL-SCNC: 5.5 MEQ/L (ref 3.5–5.1)
RBC # BLD AUTO: 2.75 10^6/UL (ref 4–5.4)
RBC MORPH BLD: NORMAL
SODIUM SERPL-SCNC: 139 MEQ/L (ref 136–145)
VARIANT LYMPHS NFR BLD MANUAL: 2 % (ref 0–5)
WBC # BLD AUTO: 4.4 10^3/UL (ref 4–10)

## 2021-10-29 RX ADMIN — RANOLAZINE SCH MG: 500 TABLET, FILM COATED, EXTENDED RELEASE ORAL at 21:23

## 2021-10-29 RX ADMIN — ESCITALOPRAM OXALATE SCH MG: 10 TABLET, FILM COATED ORAL at 09:23

## 2021-10-29 RX ADMIN — PROBIOTIC PRODUCT - TAB SCH EA: TAB at 11:13

## 2021-10-29 RX ADMIN — MECLIZINE HYDROCHLORIDE SCH MG: 25 TABLET ORAL at 09:24

## 2021-10-29 RX ADMIN — PROMETHAZINE HYDROCHLORIDE PRN MG: 25 TABLET ORAL at 18:15

## 2021-10-29 RX ADMIN — PROBIOTIC PRODUCT - TAB SCH EA: TAB at 09:24

## 2021-10-29 RX ADMIN — BENZONATATE SCH MG: 100 CAPSULE ORAL at 21:22

## 2021-10-29 RX ADMIN — SODIUM CHLORIDE SCH ML: 9 INJECTION, SOLUTION INTRAMUSCULAR; INTRAVENOUS; SUBCUTANEOUS at 21:00

## 2021-10-29 RX ADMIN — IPRATROPIUM BROMIDE AND ALBUTEROL SCH PUFF: 20; 100 SPRAY, METERED RESPIRATORY (INHALATION) at 08:11

## 2021-10-29 RX ADMIN — VALSARTAN SCH MG: 80 TABLET ORAL at 09:29

## 2021-10-29 RX ADMIN — METOPROLOL SUCCINATE SCH MG: 25 TABLET, EXTENDED RELEASE ORAL at 21:22

## 2021-10-29 RX ADMIN — OCTREOTIDE ACETATE PRN LOZ: KIT at 00:22

## 2021-10-29 RX ADMIN — CLOPIDOGREL BISULFATE SCH MG: 75 TABLET ORAL at 09:24

## 2021-10-29 RX ADMIN — BENZONATATE SCH MG: 100 CAPSULE ORAL at 18:14

## 2021-10-29 RX ADMIN — ASPIRIN SCH MG: 325 TABLET, COATED ORAL at 09:24

## 2021-10-29 RX ADMIN — IPRATROPIUM BROMIDE AND ALBUTEROL SCH PUFF: 20; 100 SPRAY, METERED RESPIRATORY (INHALATION) at 20:00

## 2021-10-29 RX ADMIN — RANOLAZINE SCH MG: 500 TABLET, FILM COATED, EXTENDED RELEASE ORAL at 09:23

## 2021-10-29 RX ADMIN — PROBIOTIC PRODUCT - TAB SCH EA: TAB at 21:22

## 2021-10-29 RX ADMIN — PROBIOTIC PRODUCT - TAB SCH EA: TAB at 18:14

## 2021-10-29 RX ADMIN — FAMOTIDINE SCH MG: 20 TABLET, FILM COATED ORAL at 21:23

## 2021-10-29 RX ADMIN — PANTOPRAZOLE SODIUM SCH MG: 40 TABLET, DELAYED RELEASE ORAL at 09:24

## 2021-10-29 RX ADMIN — SODIUM CHLORIDE SCH MLS/HR: 9 INJECTION, SOLUTION INTRAVENOUS at 21:21

## 2021-10-29 RX ADMIN — DEXAMETHASONE SODIUM PHOSPHATE SCH MG: 4 INJECTION, SOLUTION INTRAMUSCULAR; INTRAVENOUS at 18:14

## 2021-10-29 RX ADMIN — PROMETHAZINE HYDROCHLORIDE PRN MG: 25 TABLET ORAL at 09:24

## 2021-10-29 RX ADMIN — METOPROLOL SUCCINATE SCH MG: 25 TABLET, EXTENDED RELEASE ORAL at 09:00

## 2021-10-29 NOTE — IPNPDOC
Text Note


Date of Service


The patient was seen on 10/29/21.





NOTE


SUBJECTIVE:


-No acute complaints





OBJECTIVE:


VITAL SIGNS: see below


GENERAL APPEARANCE: NAD, ill appearing


HEENT: NCAT, EOMI, MMM


CARDIOVASCULAR: RRR, no m/r/g


LUNGS: CTAB, tachypneic, on 3L NC, diminished breath sounds, no crackles, no 

wheezing or rhonchi


ABDOMEN: Normoactive bowel sounds, soft, NTND


EXTREMITIES: WWP, no LE edema


NEUROLOGICAL: CM3-12 intact, nonfocal examination


PSYCHIATRIC: AOx3





LABORATORY DATA: Reviewed


WBC 4.4


hgb 10.3


platelets 109


Cr 1.51


Mag 1.5 (repleted)


K 5.5





IMAGING:





CXR:





Mild scattered interstitial fibrotic changes noted.  There is no definite 

superimposed


acute infiltrate.  The heart is not significantly enlarged.  The mediastinal


silhouette is unchanged with some calcific plaque in the thoracic aorta.  

Multiple


sternal wires and mediastinal clips are present.





IMPRESSION:


Stable chronic findings.  No evidence of acute infiltrate.








MICROBIOLOGY: Please see below. 





ASSESSMENT: 


76 yo W with a history of CAD s/p CABG x 3, and thereafter PCI with coronary 

stents, PVD with LE stents, carotid artery disease, angina, HLD, HTN, CHF?, 

history of recurrent Cdiff s/p fecal transplant in 2018, CKD with solitary R 

kidney, history of papillary urothelial CA, who lives at home with her  

and they were ill for approximately 2 weeks and now admitted for covid-19 PNA 

with related pancytopenia, dehydration and lactic acidosis.





PLAN:





Covid-19 PNA: hypoxemia, fever, dyspnea, elevated inflammatory markers


-continue remdesevir and dexamethasone, day 4


-supplemental O2 to goal >89%


-PRN albuterol q4hp


-BID combivent


-encourage awake proning


-q4h sats with vitals


-incentive spirometry


-inflammatory markers per covid protocol


-tramadol per home script for severe pain


-s/p IVF








Potential superimposed CAP given fever, elevated procal, despite negative 

imaging


-will give empiric levofloxacin PO, day 3


-urine strep, legionella and check mycoplasma


-if she is expectorating mucus, will send sputum Cx





Lactic acidosis: i/s/o dehydration and severe illness, resolved


-s/p IVF





CKD: stable


-daily BMP





HTN:


-continue home ARB and toprol XL





Diarrhea: likely 2/2 covid 


-GI panel negative


-continue home probiotic as ACHS





GERD:


-continue H2B and PPI per home script





CAD with chronic angina, PVD with stents:


-continue ASA, plavix, ranolazine, PRN SLN, toprol XL





Depression:


-continue home lexapro





Hx of gout:


-continue home allopurinol





Hypomagnesemia:


-replete PRN





Chronic anemia:


-to continue home B12 on discharge 





Pancytopenia likely 2/2 covid-19 infection: improving


-monitor daily CBC, goal hgb >8, platelets >20 if febrile, >50 if bleeding, 

otherwise platelets >10





DVT ppx: TEDs and SCDs





Deconditioning:


-PT/OT





VS,Fishbone, I+O


VS, Fishbone, I+O


Laboratory Tests


10/29/21 05:41











Vital Signs








  Date Time  Temp Pulse Resp B/P (MAP) Pulse Ox O2 Delivery O2 Flow Rate FiO2


 


10/29/21 06:53 97.8 68 20 123/64 (83) 96 Nasal Cannula 3.0 














I&O- Last 24 Hours up to 6 AM 


 


 10/29/21





 06:00


 


Intake Total 1400 ml


 


Balance 1400 ml

















SARAH BUSTAMANTE MD   Oct 29, 2021 09:34

## 2021-10-30 VITALS — SYSTOLIC BLOOD PRESSURE: 165 MMHG | DIASTOLIC BLOOD PRESSURE: 70 MMHG

## 2021-10-30 VITALS — OXYGEN SATURATION: 98 %

## 2021-10-30 LAB
ALBUMIN SERPL BCG-MCNC: 2.5 GM/DL (ref 3.2–5.2)
ALT SERPL W P-5'-P-CCNC: 23 U/L (ref 12–78)
ANISOCYTOSIS BLD QL SMEAR: (no result)
APTT BLD: 30.9 SECONDS (ref 25.9–37)
BILIRUB CONJ SERPL-MCNC: 0.1 MG/DL (ref 0–0.2)
BILIRUB SERPL-MCNC: 0.5 MG/DL (ref 0.2–1)
BUN SERPL-MCNC: 46 MG/DL (ref 7–18)
CALCIUM SERPL-MCNC: 8.3 MG/DL (ref 8.8–10.2)
CHLORIDE SERPL-SCNC: 110 MEQ/L (ref 98–107)
CK SERPL-CCNC: 82 U/L (ref 26–192)
CO2 SERPL-SCNC: 21 MEQ/L (ref 21–32)
CREAT SERPL-MCNC: 1.42 MG/DL (ref 0.55–1.3)
FERRITIN SERPL-MCNC: 500 NG/ML (ref 8–252)
FIBRINOGEN PPP-MCNC: 390 MG/DL (ref 268–480)
GFR SERPL CREATININE-BSD FRML MDRD: 38.2 ML/MIN/{1.73_M2} (ref 39–?)
GLUCOSE SERPL-MCNC: 172 MG/DL (ref 70–100)
HCT VFR BLD AUTO: 29.1 % (ref 36–47)
HGB BLD-MCNC: 9.7 G/DL (ref 12–15.5)
INR PPP: 1.21
LDH SERPL L TO P-CCNC: 513 U/L (ref 84–246)
LYMPHOCYTES NFR BLD MANUAL: 17 % (ref 16–44)
MACROCYTES BLD QL SMEAR: (no result)
MAGNESIUM SERPL-MCNC: 1.7 MG/DL (ref 1.8–2.4)
MCH RBC QN AUTO: 36.3 PG (ref 27–33)
MCHC RBC AUTO-ENTMCNC: 33.3 G/DL (ref 32–36.5)
MCV RBC AUTO: 109 FL (ref 80–96)
METAMYELOCYTES NFR BLD MANUAL: 1 % (ref 0–0)
MONOCYTES NFR BLD MANUAL: 3 % (ref 0–5)
MYELOBLASTS NFR BLD MANUAL: 1 % (ref 0–0)
NEUTROPHILS NFR BLD MANUAL: 72 % (ref 28–66)
NT-PRO BNP: 955 PG/ML (ref ?–450)
PLATELET # BLD AUTO: 106 10^3/UL (ref 150–450)
PLATELET BLD QL SMEAR: (no result)
POTASSIUM SERPL-SCNC: 4.9 MEQ/L (ref 3.5–5.1)
PROT SERPL-MCNC: 6.6 GM/DL (ref 6.4–8.2)
PROTHROMBIN TIME: 15.7 SECONDS (ref 12.7–14.5)
RBC # BLD AUTO: 2.67 10^6/UL (ref 4–5.4)
SODIUM SERPL-SCNC: 139 MEQ/L (ref 136–145)
TROPONIN I SERPL-MCNC: < 0.02 NG/ML (ref ?–0.1)
WBC # BLD AUTO: 6.3 10^3/UL (ref 4–10)

## 2021-10-30 RX ADMIN — METOPROLOL SUCCINATE SCH MG: 25 TABLET, EXTENDED RELEASE ORAL at 20:21

## 2021-10-30 RX ADMIN — RANOLAZINE SCH MG: 500 TABLET, FILM COATED, EXTENDED RELEASE ORAL at 08:44

## 2021-10-30 RX ADMIN — DEXAMETHASONE SODIUM PHOSPHATE SCH MG: 4 INJECTION, SOLUTION INTRAMUSCULAR; INTRAVENOUS at 16:58

## 2021-10-30 RX ADMIN — IPRATROPIUM BROMIDE AND ALBUTEROL SCH PUFF: 20; 100 SPRAY, METERED RESPIRATORY (INHALATION) at 19:43

## 2021-10-30 RX ADMIN — MECLIZINE HYDROCHLORIDE SCH MG: 25 TABLET ORAL at 08:47

## 2021-10-30 RX ADMIN — IBUPROFEN PRN MG: 400 TABLET, FILM COATED ORAL at 21:37

## 2021-10-30 RX ADMIN — ESCITALOPRAM OXALATE SCH MG: 10 TABLET, FILM COATED ORAL at 08:46

## 2021-10-30 RX ADMIN — BENZONATATE SCH MG: 100 CAPSULE ORAL at 20:16

## 2021-10-30 RX ADMIN — PROBIOTIC PRODUCT - TAB SCH EA: TAB at 16:58

## 2021-10-30 RX ADMIN — SODIUM CHLORIDE SCH MLS/HR: 9 INJECTION, SOLUTION INTRAVENOUS at 20:16

## 2021-10-30 RX ADMIN — PROBIOTIC PRODUCT - TAB SCH EA: TAB at 08:43

## 2021-10-30 RX ADMIN — METOPROLOL SUCCINATE SCH MG: 25 TABLET, EXTENDED RELEASE ORAL at 08:45

## 2021-10-30 RX ADMIN — BENZONATATE SCH MG: 100 CAPSULE ORAL at 08:44

## 2021-10-30 RX ADMIN — FAMOTIDINE SCH MG: 20 TABLET, FILM COATED ORAL at 20:16

## 2021-10-30 RX ADMIN — SODIUM CHLORIDE SCH ML: 9 INJECTION, SOLUTION INTRAMUSCULAR; INTRAVENOUS; SUBCUTANEOUS at 21:00

## 2021-10-30 RX ADMIN — BENZONATATE SCH MG: 100 CAPSULE ORAL at 16:59

## 2021-10-30 RX ADMIN — ASPIRIN SCH MG: 325 TABLET, COATED ORAL at 08:53

## 2021-10-30 RX ADMIN — PANTOPRAZOLE SODIUM SCH MG: 40 TABLET, DELAYED RELEASE ORAL at 08:47

## 2021-10-30 RX ADMIN — RANOLAZINE SCH MG: 500 TABLET, FILM COATED, EXTENDED RELEASE ORAL at 20:16

## 2021-10-30 RX ADMIN — PROBIOTIC PRODUCT - TAB SCH EA: TAB at 12:30

## 2021-10-30 RX ADMIN — IPRATROPIUM BROMIDE AND ALBUTEROL SCH PUFF: 20; 100 SPRAY, METERED RESPIRATORY (INHALATION) at 08:16

## 2021-10-30 RX ADMIN — VALSARTAN SCH MG: 80 TABLET ORAL at 08:43

## 2021-10-30 RX ADMIN — PROBIOTIC PRODUCT - TAB SCH EA: TAB at 20:17

## 2021-10-30 RX ADMIN — CLOPIDOGREL BISULFATE SCH MG: 75 TABLET ORAL at 08:44

## 2021-10-30 NOTE — IPNPDOC
Text Note


Date of Service


The patient was seen on 10/30/21.





NOTE


SUBJECTIVE:


-No acute complaints


-Quite sad today, her  passed away yesterday while in the same room from 

c/o covid-19.


-has a dry cough that has some improvement with the tessalon perles





OBJECTIVE:


VITAL SIGNS: see below


GENERAL APPEARANCE: NAD, ill appearing


HEENT: NCAT, EOMI, MMM


CARDIOVASCULAR: RRR, no m/r/g


LUNGS: CTAB, tachypneic, on 3L NC, diminished breath sounds, no crackles, no 

wheezing or rhonchi


ABDOMEN: Normoactive bowel sounds, soft, NTND


EXTREMITIES: WWP, no LE edema


NEUROLOGICAL: CM3-12 intact, nonfocal examination


PSYCHIATRIC: AOx3





LABORATORY DATA: Reviewed


WBC 4.4


hgb 10.3


platelets 109


Cr 1.51


Mag 1.7 (repleted)


K 5.5





IMAGING:





CXR:





Mild scattered interstitial fibrotic changes noted.  There is no definite 

superimposed


acute infiltrate.  The heart is not significantly enlarged.  The mediastinal


silhouette is unchanged with some calcific plaque in the thoracic aorta.  

Multiple


sternal wires and mediastinal clips are present.





IMPRESSION:


Stable chronic findings.  No evidence of acute infiltrate.








MICROBIOLOGY: Please see below. 





ASSESSMENT: 


76 yo W with a history of CAD s/p CABG x 3, and thereafter PCI with coronary 

stents, PVD with LE stents, carotid artery disease, angina, HLD, HTN, CHF?, 

history of recurrent Cdiff s/p fecal transplant in 2018, CKD with solitary R 

kidney, history of papillary urothelial CA, who was ill for approximately 2 

weeks and now admitted for covid-19 PNA with related pancytopenia, dehydration 

and lactic acidosis.





PLAN:





Covid-19 PNA: hypoxemia, fever, dyspnea, elevated inflammatory markers


-continue remdesevir and dexamethasone, day 5


-supplemental O2 to goal >89%


-PRN albuterol q4hp


-BID combivent


-encourage awake proning


-q4h sats with vitals


-incentive spirometry


-inflammatory markers per covid protocol


-tramadol per home script for severe pain


-s/p IVF


-tessalon perles for cough





Potential superimposed CAP given fever, elevated procal, despite negative 

imaging


-will give empiric levofloxacin PO, day 4


-had prior exposure to mycoplasma with positive IgG but negative IgM


-if she is expectorating mucus, will send sputum Cx





Lactic acidosis: i/s/o dehydration and severe illness, resolved


-s/p IVF





CKD: stable


-daily BMP





HTN:


-continue home ARB and toprol XL





Diarrhea: likely 2/2 covid 


-GI panel negative


-continue home probiotic as ACHS





GERD:


-continue H2B and PPI per home script





CAD with chronic angina, PVD with stents:


-continue ASA, plavix, ranolazine, PRN SLN, toprol XL





Depression:


-continue home lexapro





Hx of gout:


-continue home allopurinol





Hypomagnesemia:


-replete PRN





Chronic anemia:


-to continue home B12 on discharge 





Pancytopenia likely 2/2 covid-19 infection: improving


-monitor daily CBC, goal hgb >8, platelets >20 if febrile, >50 if bleeding, 

otherwise platelets >10





DVT ppx: TEDs and SCDs





Deconditioning:


-PT/OT





VS,Fishbone, I+O


VS, Fishbone, I+O


Laboratory Tests


10/30/21 07:23











Vital Signs








  Date Time  Temp Pulse Resp B/P (MAP) Pulse Ox O2 Delivery O2 Flow Rate FiO2


 


10/30/21 08:45  60      


 


10/30/21 08:43    159/74    


 


10/30/21 06:12 97.0  20  96 Nasal Cannula 3.0 














I&O- Last 24 Hours up to 6 AM 


 


 10/30/21





 05:59


 


Intake Total 1540 ml


 


Balance 1540 ml

















SARAH BUSTAMANTE MD   Oct 30, 2021 09:16

## 2021-10-31 VITALS — SYSTOLIC BLOOD PRESSURE: 133 MMHG | DIASTOLIC BLOOD PRESSURE: 59 MMHG

## 2021-10-31 VITALS — DIASTOLIC BLOOD PRESSURE: 71 MMHG | SYSTOLIC BLOOD PRESSURE: 163 MMHG

## 2021-10-31 VITALS — SYSTOLIC BLOOD PRESSURE: 160 MMHG | DIASTOLIC BLOOD PRESSURE: 92 MMHG

## 2021-10-31 VITALS — SYSTOLIC BLOOD PRESSURE: 157 MMHG | DIASTOLIC BLOOD PRESSURE: 77 MMHG

## 2021-10-31 VITALS — DIASTOLIC BLOOD PRESSURE: 77 MMHG | SYSTOLIC BLOOD PRESSURE: 157 MMHG

## 2021-10-31 LAB
ANISOCYTOSIS BLD QL SMEAR: (no result)
BUN SERPL-MCNC: 53 MG/DL (ref 7–18)
CALCIUM SERPL-MCNC: 8.9 MG/DL (ref 8.8–10.2)
CHLORIDE SERPL-SCNC: 107 MEQ/L (ref 98–107)
CO2 SERPL-SCNC: 24 MEQ/L (ref 21–32)
CREAT SERPL-MCNC: 1.72 MG/DL (ref 0.55–1.3)
GFR SERPL CREATININE-BSD FRML MDRD: 30.6 ML/MIN/{1.73_M2} (ref 39–?)
GLUCOSE SERPL-MCNC: 201 MG/DL (ref 70–100)
HCT VFR BLD AUTO: 33.5 % (ref 36–47)
HGB BLD-MCNC: 10.9 G/DL (ref 12–15.5)
LYMPHOCYTES NFR BLD MANUAL: 3 % (ref 16–44)
MACROCYTES BLD QL SMEAR: (no result)
MAGNESIUM SERPL-MCNC: 1.9 MG/DL (ref 1.8–2.4)
MCH RBC QN AUTO: 35.9 PG (ref 27–33)
MCHC RBC AUTO-ENTMCNC: 32.5 G/DL (ref 32–36.5)
MCV RBC AUTO: 110.2 FL (ref 80–96)
METAMYELOCYTES NFR BLD MANUAL: 1 % (ref 0–0)
MYELOBLASTS NFR BLD MANUAL: 2 % (ref 0–0)
NEUTROPHILS NFR BLD MANUAL: 91 % (ref 28–66)
PLATELET # BLD AUTO: 132 10^3/UL (ref 150–450)
PLATELET BLD QL SMEAR: (no result)
POLYCHROMASIA BLD QL SMEAR: (no result)
POTASSIUM SERPL-SCNC: 4.1 MEQ/L (ref 3.5–5.1)
RBC # BLD AUTO: 3.04 10^6/UL (ref 4–5.4)
SODIUM SERPL-SCNC: 140 MEQ/L (ref 136–145)
VARIANT LYMPHS NFR BLD MANUAL: 1 % (ref 0–5)
WBC # BLD AUTO: 12.6 10^3/UL (ref 4–10)

## 2021-10-31 RX ADMIN — PANTOPRAZOLE SODIUM SCH MG: 40 TABLET, DELAYED RELEASE ORAL at 09:03

## 2021-10-31 RX ADMIN — BENZONATATE SCH MG: 100 CAPSULE ORAL at 16:18

## 2021-10-31 RX ADMIN — DEXAMETHASONE SODIUM PHOSPHATE SCH MG: 4 INJECTION, SOLUTION INTRAMUSCULAR; INTRAVENOUS at 17:08

## 2021-10-31 RX ADMIN — ESCITALOPRAM OXALATE SCH MG: 10 TABLET, FILM COATED ORAL at 09:04

## 2021-10-31 RX ADMIN — IPRATROPIUM BROMIDE AND ALBUTEROL SCH PUFF: 20; 100 SPRAY, METERED RESPIRATORY (INHALATION) at 08:13

## 2021-10-31 RX ADMIN — PROBIOTIC PRODUCT - TAB SCH EA: TAB at 17:08

## 2021-10-31 RX ADMIN — FAMOTIDINE SCH MG: 20 TABLET, FILM COATED ORAL at 20:44

## 2021-10-31 RX ADMIN — METOPROLOL SUCCINATE SCH MG: 25 TABLET, EXTENDED RELEASE ORAL at 20:49

## 2021-10-31 RX ADMIN — PROBIOTIC PRODUCT - TAB SCH EA: TAB at 08:58

## 2021-10-31 RX ADMIN — IBUPROFEN PRN MG: 400 TABLET, FILM COATED ORAL at 23:42

## 2021-10-31 RX ADMIN — OCTREOTIDE ACETATE PRN LOZ: KIT at 05:52

## 2021-10-31 RX ADMIN — PROBIOTIC PRODUCT - TAB SCH EA: TAB at 20:43

## 2021-10-31 RX ADMIN — ASPIRIN SCH MG: 325 TABLET, COATED ORAL at 09:02

## 2021-10-31 RX ADMIN — BENZONATATE SCH MG: 100 CAPSULE ORAL at 09:00

## 2021-10-31 RX ADMIN — RANOLAZINE SCH MG: 500 TABLET, FILM COATED, EXTENDED RELEASE ORAL at 09:02

## 2021-10-31 RX ADMIN — VALSARTAN SCH MG: 80 TABLET ORAL at 09:02

## 2021-10-31 RX ADMIN — OCTREOTIDE ACETATE PRN LOZ: KIT at 16:19

## 2021-10-31 RX ADMIN — PROBIOTIC PRODUCT - TAB SCH EA: TAB at 11:47

## 2021-10-31 RX ADMIN — BENZONATATE SCH MG: 100 CAPSULE ORAL at 20:44

## 2021-10-31 RX ADMIN — MECLIZINE HYDROCHLORIDE SCH MG: 25 TABLET ORAL at 09:03

## 2021-10-31 RX ADMIN — METOPROLOL SUCCINATE SCH MG: 25 TABLET, EXTENDED RELEASE ORAL at 09:00

## 2021-10-31 RX ADMIN — RANOLAZINE SCH MG: 500 TABLET, FILM COATED, EXTENDED RELEASE ORAL at 20:44

## 2021-10-31 RX ADMIN — PROMETHAZINE HYDROCHLORIDE PRN MG: 25 TABLET ORAL at 11:47

## 2021-10-31 RX ADMIN — CLOPIDOGREL BISULFATE SCH MG: 75 TABLET ORAL at 09:04

## 2021-10-31 RX ADMIN — IPRATROPIUM BROMIDE AND ALBUTEROL SCH PUFF: 20; 100 SPRAY, METERED RESPIRATORY (INHALATION) at 19:57

## 2021-10-31 NOTE — IPNPDOC
Text Note


Date of Service


The patient was seen on 10/31/21.





NOTE


SUBJECTIVE:


-No acute complaints


-was going to be discharged home today but family is still preparing self 

isolation room for her with facilities. [employer] already delivered portable tank 

for transport home and are delivering the concentrator to her sister's residence

where she is being discharged to. Plan will be for discharge home with home 

services and home PT tomorrow AM.





OBJECTIVE:


VITAL SIGNS: see below


GENERAL APPEARANCE: NAD, ill appearing


HEENT: NCAT, EOMI, MMM


CARDIOVASCULAR: RRR, no m/r/g


LUNGS: CTAB, tachypneic, on 3L NC, diminished breath sounds, no crackles, no 

wheezing or rhonchi


ABDOMEN: Normoactive bowel sounds, soft, NTND


EXTREMITIES: WWP, no LE edema


NEUROLOGICAL: CM3-12 intact, nonfocal examination


PSYCHIATRIC: AOx3





LABORATORY DATA: Reviewed





IMAGING:





CXR:





Mild scattered interstitial fibrotic changes noted.  There is no definite 

superimposed


acute infiltrate.  The heart is not significantly enlarged.  The mediastinal


silhouette is unchanged with some calcific plaque in the thoracic aorta.  

Multiple


sternal wires and mediastinal clips are present.





IMPRESSION:


Stable chronic findings.  No evidence of acute infiltrate.








MICROBIOLOGY: Please see below. 





ASSESSMENT: 


76 yo W with a history of CAD s/p CABG x 3, and thereafter PCI with coronary 

stents, PVD with LE stents, carotid artery disease, angina, HLD, HTN, CHF?, 

history of recurrent Cdiff s/p fecal transplant in 2018, CKD with solitary R 

kidney, history of papillary urothelial CA, who was ill for approximately 2 

weeks and now admitted for covid-19 PNA with related pancytopenia, dehydration 

and lactic acidosis.





PLAN:





Covid-19 PNA: hypoxemia, fever, dyspnea, elevated inflammatory markers


-Discontinue remdesevir, today is day 5


-dexamethasone, day 6


-supplemental O2 to goal >89%


-PRN albuterol q4hp


-BID combivent


-q4h sats with vitals


-incentive spirometry


-inflammatory markers per covid protocol


-tramadol per home script for severe pain


-s/p IVF


-tessalon perles for cough





Potential superimposed CAP given fever, elevated procal, despite negative 

imaging


-will give empiric levofloxacin PO, day 5


-had prior exposure to mycoplasma with positive IgG but negative IgM





Lactic acidosis: i/s/o dehydration and severe illness, resolved


-s/p IVF





CKD: stable


-daily BMP





HTN:


-continue home ARB and toprol XL





Diarrhea: likely 2/2 covid 


-GI panel negative


-continue home probiotic as ACHS





GERD:


-continue H2B and PPI per home script





CAD with chronic angina, PVD with stents:


-continue ASA, plavix, ranolazine, PRN SLN, toprol XL





Depression:


-continue home lexapro





Hx of gout:


-continue home allopurinol





Hypomagnesemia:


-replete PRN





Chronic anemia:


-to continue home B12 on discharge 





Pancytopenia likely 2/2 covid-19 infection: improving


-monitor daily CBC, goal hgb >8, platelets >20 if febrile, >50 if bleeding, 

otherwise platelets >10





DVT ppx: TEDs and SCDs





Deconditioning:


-PT/OT, recommended home with services 





Dispo: was going to be discharged home today but family is still preparing self 

isolation room for her with facilities. [employer] already delivered portable tank 

for transport home and are delivering the concentrator to her sister's residence

where she is being discharged to. Plan will be for discharge home with home 

services and home PT tomorrow AM.





VS,Fishbone, I+O


VS, Fishbone, I+O


Laboratory Tests


10/31/21 05:06











Vital Signs








  Date Time  Temp Pulse Resp B/P (MAP) Pulse Ox O2 Delivery O2 Flow Rate FiO2


 


10/31/21 09:02    146/67    


 


10/31/21 09:00  63      


 


10/31/21 06:36 96.7  18  93 Nasal Cannula 2.0 














I&O- Last 24 Hours up to 6 AM 


 


 10/31/21





 06:00


 


Intake Total 1080 ml


 


Output Total 300 ml


 


Balance 780 ml

















SARAH BUSTAMANTE MD   Oct 31, 2021 10:54

## 2021-11-01 VITALS — SYSTOLIC BLOOD PRESSURE: 146 MMHG | DIASTOLIC BLOOD PRESSURE: 69 MMHG

## 2021-11-01 LAB
ALBUMIN SERPL BCG-MCNC: 2.8 GM/DL (ref 3.2–5.2)
ALT SERPL W P-5'-P-CCNC: 21 U/L (ref 12–78)
APTT BLD: 28.4 SECONDS (ref 25.9–37)
BILIRUB CONJ SERPL-MCNC: 0.3 MG/DL (ref 0–0.2)
BILIRUB SERPL-MCNC: 0.6 MG/DL (ref 0.2–1)
CK SERPL-CCNC: 60 U/L (ref 26–192)
FERRITIN SERPL-MCNC: 519 NG/ML (ref 8–252)
FIBRINOGEN PPP-MCNC: 391 MG/DL (ref 268–480)
INR PPP: 1.15
LDH SERPL L TO P-CCNC: 402 U/L (ref 84–246)
NT-PRO BNP: 627 PG/ML (ref ?–450)
PROT SERPL-MCNC: 6.1 GM/DL (ref 6.4–8.2)
PROTHROMBIN TIME: 15.1 SECONDS (ref 12.7–14.5)
TROPONIN I SERPL-MCNC: < 0.02 NG/ML (ref ?–0.1)

## 2021-11-01 RX ADMIN — IPRATROPIUM BROMIDE AND ALBUTEROL SCH PUFF: 20; 100 SPRAY, METERED RESPIRATORY (INHALATION) at 08:04

## 2021-11-01 RX ADMIN — ASPIRIN SCH MG: 325 TABLET, COATED ORAL at 09:03

## 2021-11-01 RX ADMIN — RANOLAZINE SCH MG: 500 TABLET, FILM COATED, EXTENDED RELEASE ORAL at 09:05

## 2021-11-01 RX ADMIN — PROBIOTIC PRODUCT - TAB SCH EA: TAB at 09:05

## 2021-11-01 RX ADMIN — METOPROLOL SUCCINATE SCH MG: 25 TABLET, EXTENDED RELEASE ORAL at 09:06

## 2021-11-01 RX ADMIN — VALSARTAN SCH MG: 80 TABLET ORAL at 09:04

## 2021-11-01 RX ADMIN — CLOPIDOGREL BISULFATE SCH MG: 75 TABLET ORAL at 09:04

## 2021-11-01 RX ADMIN — PROBIOTIC PRODUCT - TAB SCH EA: TAB at 11:48

## 2021-11-01 RX ADMIN — MECLIZINE HYDROCHLORIDE SCH MG: 25 TABLET ORAL at 09:03

## 2021-11-01 RX ADMIN — ESCITALOPRAM OXALATE SCH MG: 10 TABLET, FILM COATED ORAL at 09:02

## 2021-11-01 RX ADMIN — PANTOPRAZOLE SODIUM SCH MG: 40 TABLET, DELAYED RELEASE ORAL at 09:05

## 2021-11-01 RX ADMIN — BENZONATATE SCH MG: 100 CAPSULE ORAL at 09:04

## 2021-11-01 NOTE — DS.PDOC
Discharge Summary


General


Date of Admission


Oct 26, 2021 at 15:16


Date of Discharge


11/1/2021


Attending Physician:  SARAH BUSTAMANTE MD





Discharge Summary


PROCEDURES PERFORMED DURING STAY: None





ADMITTING DIAGNOSES: 


Covid-19 PNA





DISCHARGE DIAGNOSES:


Covid-19 PNA


CAP


Pancytopenia 2/2/ covid-19 infection


lactic acidosis


Acute vs. Chronic? hypoxemic respiratory failure


Chronic HFpEF 


COPD, on intermittent use of 2-3L at baseline


CAD


Chronic angina


PVD


HTN


HLD


CKD with solitary R kidney








COMPLICATIONS/CHIEF COMPLAINT: Covid,Diarrhea,Fever,Hypoxia,Weakness 

Generalized.





HISTORY OF PRESENT ILLNESS:


74 yo W with a history of CAD s/p CABG x 3, and thereafter PCI with coronary 

stents, PVD with LE stents, carotid artery disease, angina, HLD, HTN, CHF?, 

history of recurrent Cdiff s/p fecal transplant in 2018, CKD with solitary R 

kidney after left was removed due to malignancy, history of papillary urothelial

CA, HTN who came in from home where she lived with her  and they had been

ill for approximately 2 weeks after he got ill first but was declining coming to

the hospital and she had progressively been getting worse with poor PO, 

worsening weakness, fever, chills, some diarrhea, and now exertional shortness 

of breath that she decided to come to the ED. 





HOSPITAL COURSE:


In the ED, she was febrile to 101.7, hypertensive to 172/72 and noted to be 

hypoxemic to 87% on room air w/ improvement on 2L NC. Workup was notable for 

respiratory panel that was positive for covid-19 infection, pancytopenia that is

new with WBC 2.1, hgb 9.8 (close to baseline anemia), thrombocytopenia to 90, Cr

1.63 at baseline, Na 134, K 4.6, mag 1.4, procalcitonin 0.69, CRP 10, lactic 

acid 2.3 while troponin was negative, EKG was non-ischemic and CXR showed no new

infiltrates or effusions. She was admitted for covid-19 PNA with related 

pancytopenia and generalized illness. She was started on dexamethasone 6mg Iv 

daily and received remdesevir for 5d with improvement in her symptoms, and is 

now being discharged home stably on 2L to continue self isolation until all 

symptoms resolve. Of note, her  who was also admitted for covid-19 PNA 

passed away 2/2/ covid-19 related complications during this hospitalization. She

is now being discharged to her sister's home where she will continue self 

isolation and will have PCP follow up within 7d, preferably via telemedicine.





DISCHARGE MEDICATIONS: Please see below.


 


ALLERGIES: Please see below.





PHYSICAL EXAMINATION ON DISCHARGE:


VITAL SIGNS: Please see below.


GENERAL APPEARANCE: NAD


HEENT: NCAT, EOMI, MMM


CARDIOVASCULAR: RRR, no m/r/g


LUNGS: CTAB, on 2L NC, diminished breath sounds, no crackles, no wheezing or rho

nchi


ABDOMEN: Normoactive bowel sounds, soft, NTND


EXTREMITIES: WWP, no LE edema


NEUROLOGICAL: CM3-12 intact, nonfocal examination


PSYCHIATRIC: AOx3





LABORATORY DATA: Please see below.





IMAGING: 





CXR:





Mild scattered interstitial fibrotic changes noted.  There is no definite 

superimposed


acute infiltrate.  The heart is not significantly enlarged.  The mediastinal


silhouette is unchanged with some calcific plaque in the thoracic aorta.  

Multiple


sternal wires and mediastinal clips are present.





IMPRESSION:


Stable chronic findings.  No evidence of acute infiltrate.





PROGNOSIS: Good





ACTIVITY: As tolerated





DIET: 2g sodium, low cholesterol diet





DISCHARGE PLAN: Home with home services with 3 more doses of levaquin Q48H.





DISPOSITION: Home with services





DISCHARGE INSTRUCTIONS:


Home with home services with 3 more doses of levaquin Q48H.





ITEMS TO FOLLOWUP ON ON OUTPATIENT:


Covid-19 PNA 


superimposed bacterial PNA


CKD


Hypoxemic respiratory failure





DISCHARGE CONDITION: Stable





TIME SPENT ON DISCHARGE: 46 minutes.





Vital Signs/I&Os





Vital Signs








  Date Time  Temp Pulse Resp B/P (MAP) Pulse Ox O2 Delivery O2 Flow Rate FiO2


 


10/31/21 09:02    146/67    


 


10/31/21 09:00  63      


 


10/31/21 06:36 96.7  18  93 Nasal Cannula 2.0 














I&O- Last 24 Hours up to 6 AM 


 


 10/31/21





 06:00


 


Intake Total 1080 ml


 


Output Total 300 ml


 


Balance 780 ml











Laboratory Data


Labs 24H


Laboratory Tests 2


10/31/21 05:06: 


Immature Granulocyte % (Auto) , Neutrophils (%) (Auto) , Nucleated Red Blood 

Cells % (auto) 0.3H, Neutrophils 91H, Band Neutrophils 2, Lymphocytes (Manual) 

3L, Metamyelocytes 1H, Myelocytes 2H, Atypical Lymphocytes 1, Polychromasia 1+, 

Anisocytosis 2+, Macrocytosis 2+, Platelet Estimate DECREASED, Anion Gap 9, 

Glomerular Filtration Rate 30.6L, Calcium Level 8.9, Magnesium Level 1.9


CBC/BMP


Laboratory Tests


10/31/21 05:06











Microbiology





Microbiology


10/26/21 Gastrointestinal Tract Panel (PCR) - Final, Complete


           


10/26/21 Blood Culture - Preliminary, Resulted


           No Growth after 72 hours. All specime...


10/26/21 Blood Culture - Preliminary, Resulted


           No Growth after 72 hours. All specime...


10/26/21 Respiratory Virus Panel (PCR) ([facility]) - Final, Complete


           SARS-CoV-2 (COVID 19)





Discharge Medications


Scheduled


Alirocumab (Praluent Pen) 75 Mg/1 Ml Pen.injctr, 1 ML SC MTHLY, (Reported)


Aspirin (Aspirin EC) 325 Mg Tabec, 325 MG PO DAILY


Benzonatate (Benzonatate) 100 Mg Capsule, 100 MG PO TID


Cholecalciferol (Vitamin D3) (Vitamin D3) 25 Mcg Capsule, 25 MCG PO QWEEK, 

(Reported)


Clopidogrel Bisulfate (Plavix) 75 Mg Tab, 75 MG PO DAILY


Cyanocobalamin (Vitamin B-12) (Vitamin B-12) 100 Mcg Tablet, 100 MCG PO QWEEK, 

(Reported)


Escitalopram Oxalate (Lexapro) 10 Mg Tablet, 10 MG PO DAILY


Famotidine (Famotidine) 40 Mg Tablet, 40 MG PO QHS


Icosapent Ethyl (Vascepa) 1 Gm Capsule, 2 GM PO BID


Ipratropium/Albuterol Sulfate (Combivent Respimat  Mcg) 4 Gm Mist.inhal, 1

PUFF INH RBID


Lactobacillus Acidophilus (Probiotic) 1 Each Capsule, 1 CAP PO DAILY


Levofloxacin (Levofloxacin) 750 Mg Tablet, 750 MG PO Q48H


Meclizine HCl (Meclizine HCl) 25 Mg Tab, 25 MG PO DAILY


Metoprolol Tartrate (Metoprolol Tartrate) 25 Mg Tablet, 1 TAB PO BID


Nitroglycerin (Nitroglycerin Patch) 0.4 Mg/Hr Dis, 0.4 MG TD DAILYPRN, 

(Reported)


Nitroglycerin (Nitrostat) 0.4 Mg Subl, 0.4 MG SL NITRO


Pantoprazole Sodium (Pantoprazole Sodium) 40 Mg Tablet.dr, 40 MG PO DAILY


Ranolazine (Ranexa) 1,000 Mg Tab, 1,000 MG PO BID


Valsartan (Valsartan) 80 Mg Tablet, 160 MG PO DAILY


Zinc (Zinc) 50 Mg Tablet, 50 MG PO DAILY


allopurinoL (allopurinoL) 300 Mg Tablet, 300 MG PO DAILY





Scheduled PRN


Albuterol Sulfate (Proair Hfa) 8.5 Gm Hfa.aer.ad, 2 PUFF INH Q6H PRN for 

SOB/WHEEZING


Promethazine HCl (Promethazine HCl) 25 Mg Tab, 25 MG PO Q6HP PRN for NAUSEA


Tramadol HCl (Tramadol HCl) 50 Mg Tab, 50 MG PO TID PRN for PAIN





Miscellaneous Medications


[Med Note]  , (Reported)


   PT STATED TAKEN NO MEDS WITHIN THE PAST WEEK FROM NOT FEELING GOOD. 





Allergies


Coded Allergies:  


     TAPE (Verified  Allergy, Intermediate, HIVES, 10/26/18)


     nitazoxanide (Verified  Allergy, Mild, RASH?, 8/3/19)


     Sulfa (Sulfonamide Antibiotics) (Verified  Allergy, Unknown, unknown, 

8/3/19)


     butalbital (Verified  Allergy, Unknown, unknown, 8/3/19)


     carvedilol (Verified  Allergy, Unknown, 10/26/21)


     yellow dye (Verified  Allergy, Unknown, only one kidney, 8/3/19)


     Tetracyclines (Verified  Adverse Reaction, Intermediate, nausea, 10/26/21)


     clindamycin (Verified  Adverse Reaction, Intermediate, vomits, 10/26/21)


     erythromycin base (Verified  Adverse Reaction, Intermediate, diarrhea, 

10/26/21)


     pentoxifylline (Verified  Adverse Reaction, Mild, GI N/V/D, 8/3/19)


     acetaminophen (Verified  Adverse Reaction, Unknown, 'dont mix with my heart

meds', 8/3/19)


     caffeine (Verified  Adverse Reaction, Unknown, because of my heart, 8/3/19)











SARAH BUSTAMANTE MD   Oct 31, 2021 11:08